# Patient Record
Sex: MALE | Race: WHITE | NOT HISPANIC OR LATINO | Employment: OTHER | URBAN - METROPOLITAN AREA
[De-identification: names, ages, dates, MRNs, and addresses within clinical notes are randomized per-mention and may not be internally consistent; named-entity substitution may affect disease eponyms.]

---

## 2017-01-04 ENCOUNTER — OFFICE VISIT (OUTPATIENT)
Dept: FAMILY MEDICINE | Facility: CLINIC | Age: 57
End: 2017-01-04
Payer: COMMERCIAL

## 2017-01-04 VITALS
HEART RATE: 86 BPM | BODY MASS INDEX: 38.07 KG/M2 | HEIGHT: 74 IN | TEMPERATURE: 96.8 F | RESPIRATION RATE: 18 BRPM | SYSTOLIC BLOOD PRESSURE: 122 MMHG | DIASTOLIC BLOOD PRESSURE: 70 MMHG | WEIGHT: 296.6 LBS

## 2017-01-04 DIAGNOSIS — A08.4 STOMACH FLU: Primary | ICD-10-CM

## 2017-01-04 PROCEDURE — 99213 OFFICE O/P EST LOW 20 MIN: CPT | Performed by: FAMILY MEDICINE

## 2017-01-04 RX ORDER — ONDANSETRON 4 MG/1
4-8 TABLET, ORALLY DISINTEGRATING ORAL EVERY 6 HOURS PRN
Qty: 30 TABLET | Refills: 0 | Status: SHIPPED | OUTPATIENT
Start: 2017-01-04 | End: 2018-03-12

## 2017-01-04 NOTE — PROGRESS NOTES
"  SUBJECTIVE:                                                    Ruddy Browning is a 56 year old male who presents to clinic today for the following health issues:      Acute Illness   Acute illness concerns: Body aches   Onset: 01/04/2017     Fever: no    Chills/Sweats: YES    Headache (location?): YES    Sinus Pressure:YES- Unsure if it is pressure or a bad headache    Conjunctivitis:  no    Ear Pain: no    Rhinorrhea: YES    Congestion: YES    Sore Throat: YES     Cough: no    Wheeze: no    Decreased Appetite: YES- Scared to eat due to the acid reflux    Nausea: YES    Vomiting: no     Diarrhea:  YES- This morning    Dysuria/Freq.: no    Fatigue/Achiness: YES    Sick/Strep Exposure: no     Therapies Tried and outcome: N/A    No results found for: RS   Chief Complaint   Patient presents with     Gastrophageal Reflux     Has had symptoms for about a week     Sick     Has felt like he is getting sick, upset stomach, and body aches.                Problem list and histories reviewed & adjusted, as indicated.  Additional history: as documented        ROS:  Constitutional, HEENT, cardiovascular, pulmonary, gi and gu systems are negative, except as otherwise noted.    OBJECTIVE:                                                    /70 mmHg  Pulse 86  Temp(Src) 96.8  F (36  C) (Tympanic)  Resp 18  Ht 6' 1.62\" (1.87 m)  Wt 296 lb 9.6 oz (134.537 kg)  BMI 38.47 kg/m2  Body mass index is 38.47 kg/(m^2).  GENERAL: healthy, alert and no distress  NECK: no adenopathy, no asymmetry, masses, or scars and thyroid normal to palpation  RESP: lungs clear to auscultation - no rales, rhonchi or wheezes  CV: regular rate and rhythm, normal S1 S2, no S3 or S4, no murmur, click or rub, no peripheral edema and peripheral pulses strong  ABDOMEN: soft, nontender, no hepatosplenomegaly, no masses and bowel sounds normal  MS: no gross musculoskeletal defects noted, no edema         ASSESSMENT/PLAN:                                 "                              ICD-10-CM    1. Stomach flu A08.4 ondansetron (ZOFRAN ODT) 4 MG ODT tab       Stomach flu. Conservative measures. Also use ondansetron for nausea. Expect this to resolve in the next few days. If not call back for further advice.    Ari Singh MD  [facility]

## 2017-01-04 NOTE — NURSING NOTE
"Chief Complaint   Patient presents with     Gastrophageal Reflux     Has had symptoms for about a week     Sick     Has felt like he is getting sick, upset stomach, and body aches.        Initial /70 mmHg  Pulse 86  Temp(Src) 96.8  F (36  C) (Tympanic)  Resp 18  Ht 6' 1.62\" (1.87 m)  Wt 296 lb 9.6 oz (134.537 kg)  BMI 38.47 kg/m2 Estimated body mass index is 38.47 kg/(m^2) as calculated from the following:    Height as of this encounter: 6' 1.62\" (1.87 m).    Weight as of this encounter: 296 lb 9.6 oz (134.537 kg).  BP completed using cuff size: large    "

## 2017-01-10 ENCOUNTER — OFFICE VISIT (OUTPATIENT)
Dept: FAMILY MEDICINE | Facility: CLINIC | Age: 57
End: 2017-01-10
Payer: COMMERCIAL

## 2017-01-10 VITALS
WEIGHT: 295.3 LBS | RESPIRATION RATE: 16 BRPM | HEIGHT: 72 IN | SYSTOLIC BLOOD PRESSURE: 112 MMHG | DIASTOLIC BLOOD PRESSURE: 78 MMHG | BODY MASS INDEX: 40 KG/M2 | OXYGEN SATURATION: 98 % | TEMPERATURE: 97.7 F | HEART RATE: 96 BPM

## 2017-01-10 DIAGNOSIS — K21.00 GASTROESOPHAGEAL REFLUX DISEASE WITH ESOPHAGITIS: Primary | ICD-10-CM

## 2017-01-10 DIAGNOSIS — K22.70 BARRETT'S ESOPHAGUS WITHOUT DYSPLASIA: ICD-10-CM

## 2017-01-10 PROCEDURE — 99213 OFFICE O/P EST LOW 20 MIN: CPT | Performed by: FAMILY MEDICINE

## 2017-01-10 RX ORDER — SUCRALFATE 1 G/1
1 TABLET ORAL 4 TIMES DAILY
Qty: 40 TABLET | Refills: 1 | Status: SHIPPED | OUTPATIENT
Start: 2017-01-10 | End: 2017-01-17

## 2017-01-10 ASSESSMENT — PAIN SCALES - GENERAL: PAINLEVEL: NO PAIN (0)

## 2017-01-10 NOTE — NURSING NOTE
Chief Complaint   Patient presents with     Consult     Reyes's esophagus flare up?       Initial /78 mmHg  Pulse 96  Temp(Src) 97.7  F (36.5  C) (Temporal)  Resp 16  Ht 6' (1.829 m)  Wt 295 lb 4.8 oz (133.947 kg)  BMI 40.04 kg/m2  SpO2 98% Estimated body mass index is 40.04 kg/(m^2) as calculated from the following:    Height as of this encounter: 6' (1.829 m).    Weight as of this encounter: 295 lb 4.8 oz (133.947 kg).  BP completed using cuff size: large    Health Maintenance Due   Topic Date Due     ADVANCE DIRECTIVE PLANNING Q5 YRS (NO INBASKET)  09/01/1979     HEPATITIS C SCREENING  09/01/1979     INFLUENZA VACCINE (SYSTEM ASSIGNED)  09/01/2016     Casandra Overton, [facility]

## 2017-01-10 NOTE — PROGRESS NOTES
"  SUBJECTIVE:                                                    Ruddy Browning is a 56 year old male who presents to clinic today for the following health issues:    Concern - Consult      Onset: flare Up?      Description:   Reyes's esophagus         Ruddy is here today for abdominal discomfort.  He is known to have Reyes's esophagitis based on the endoscopy three years ago. Since Christmas, his stomach has been feeling very uncomfortable.  Stated that in the last 3 weeks, his stomach feel \"gargle\" all the times and has been feeling bloated ; feel full all the way to his neck. His throat feels burning pain. Also has the ST with acid taste; has to elevate his head at night.  No excessive bloating or burping.  Happened after he over eating at the [employer] party with pasta. Switched Omeprazole from daily to bid with no help.  No chest pain or SOB. No N/V/D/C.  NO fever or chill. No other concern.     Problem list and histories reviewed & adjusted, as indicated.  Additional history: as documented    Patient Active Problem List   Diagnosis     Plantar fascial fibromatosis     Mixed hyperlipidemia     Hereditary and idiopathic peripheral neuropathy     Migraine     Essential hypertension, benign     ESOPHAGEAL REFLUX     Varicose veins of lower extremities with complications     Reyes's esophagus     HYPERLIPIDEMIA LDL GOAL <130     Morbid obesity (H)     Tear of medial cartilage or meniscus of Rt knee, current     Edema     Hypertension goal BP (blood pressure) < 140/90     Elevated cholesterol with elevated triglycerides     Past Surgical History   Procedure Laterality Date     Hc ugi endoscopy diag w biopsy  12/5/2008, 03/12/10     Colonoscopy  4/8/2013     Procedure: COLONOSCOPY;  Colonoscopy, Esophagogastroduodenoscopy with Single Biopsy;  Surgeon: Eddie Marshall MD;  Location: [facility]     Esophagoscopy, gastroscopy, duodenoscopy (egd), combined  4/8/2013     Procedure: COMBINED ESOPHAGOSCOPY, " GASTROSCOPY, DUODENOSCOPY (EGD), BIOPSY SINGLE OR MULTIPLE;;  Surgeon: Eddie Marshall MD;  Location: [facility]     Open reduction internal fixation wrist Right 2016     Rotator cuff repair rt/lt Right 3/2016     Remove nail bed/finger tip Left 2016       Social History   Substance Use Topics     Smoking status: Never Smoker      Smokeless tobacco: Never Used     Alcohol Use: No      Comment: rare     Family History   Problem Relation Age of Onset     HEART DISEASE Father      alcohol dependant,  at age 52     Lipids Father      CEREBROVASCULAR DISEASE Brother      had an aneurysm     Arthritis Sister      rheumatiod arthritis     Hypertension Father          Current Outpatient Prescriptions   Medication Sig Dispense Refill     sucralfate (CARAFATE) 1 GM tablet Take 1 tablet (1 g) by mouth 4 times daily 40 tablet 1     ondansetron (ZOFRAN ODT) 4 MG ODT tab Take 1-2 tablets (4-8 mg) by mouth every 6 hours as needed for nausea 30 tablet 0     omeprazole (PRILOSEC) 20 MG CR capsule TAKE ONE CAPSULE BY MOUTH TWICE A  capsule 2     pravastatin (PRAVACHOL) 20 MG tablet TAKE ONE TABLET BY MOUTH ONCE DAILY 90 tablet 3     lisinopril-hydrochlorothiazide (PRINZIDE,ZESTORETIC) 20-25 MG per tablet TAKE TWO TABLETS BY MOUTH EVERY MORNING 180 tablet 2     aspirin 81 MG tablet ONE DAILY       SUMAtriptan (IMITREX) 100 MG tablet Take 1 tablet (100 mg) by mouth at onset of headache for migraine May repeat in 2 hours if headache not gone. Max dose is 200 mg in 24 hours 9 tablet 6     cetirizine (ZYRTEC) 10 MG tablet Take 1 tablet (10 mg) by mouth daily 90 tablet 3     Cholecalciferol (VITAMIN D3 PO) Take 1,000 Units by mouth daily       MULTI VITAMIN MENS OR TABS None Entered       Allergies   Allergen Reactions     No Known Drug Allergies        ROS:  Constitutional, HEENT, cardiovascular, pulmonary, gi and gu systems are negative, except as otherwise noted.    OBJECTIVE:                                                     /78 mmHg  Pulse 96  Temp(Src) 97.7  F (36.5  C) (Temporal)  Resp 16  Ht 6' (1.829 m)  Wt 295 lb 4.8 oz (133.947 kg)  BMI 40.04 kg/m2  SpO2 98%  Body mass index is 40.04 kg/(m^2).  GENERAL: healthy, alert and no distress  HENT: ear canals and TM's normal. Nares are non-congested. Oropharynx is pink and moist. No tender with palpation to the sinuses.  NECK: no adenopathy, supple and thyroid normal to palpation  RESP: lungs clear to auscultation - no rales, rhonchi or wheezes  CV: regular rate and rhythm, no murmur.  ABDOMEN: soft, non-distended, but obese. Mild tender with palpation to the epigastric and ULQ area with no rebound or guarding and bowel sounds normal  MS: No tender with palpation to the chest wall or sternum.    Diagnostic Test Results:  none      ASSESSMENT/PLAN:                                                        ICD-10-CM    1. Gastroesophageal reflux disease with esophagitis K21.0 sucralfate (CARAFATE) 1 GM tablet   2. Reyes's esophagus without dysplasia K22.70 sucralfate (CARAFATE) 1 GM tablet     With acute flare up from dietary change. Discussed with him about the nature of the condition. Diet modification discussed and encourage blend food for now until feeling better then advance as tolerated.  Avoid greasy, acidic and spicy food. Avoid pop and late meals. Continue with Omeprazole bid and add Carafate. Also recommend OTC Maalox as needed.  Call in if not improve in the next couples days of if has any concern.    Trisha Chen Mai, MD  [facility]

## 2017-01-13 ENCOUNTER — TELEPHONE (OUTPATIENT)
Dept: FAMILY MEDICINE | Facility: CLINIC | Age: 57
End: 2017-01-13

## 2017-01-13 NOTE — TELEPHONE ENCOUNTER
Reason for Call:  Same Day Appointment, Requested Provider:  Maikol Allen M.D.    PCP: Maikol Allen    Reason for visit: patients wife calling [place], would like  to get in sooner to see Dr. Allen, (next appt, is not until 2-01-17)    Duration of symptoms: 2 1/2 -3 weeks    Have you been treated for this in the past? No    Additional comments: patients wife states that  is having trouble with stomach (possible Ulcer) Patient is afraid to eat,lost a lot of weight, trouble laying down sometimes, has seen Dr. Pickering, and Dr. Singh in the past for this, but would like to see Dr. Allen, as he is his regular doctor. Please call and advise    Can we leave a detailed message on this number? YES    Phone number patient can be reached at: Home number on file [phone] (home)    Best Time: an    Call taken on 1/13/2017 at 2:43 PM by Luisana Wolfe

## 2017-01-16 NOTE — TELEPHONE ENCOUNTER
Called pt and informed him of the below msg. He states that he was looking for an appt after 3:30. I told him the latest we can see him is 3:20 tomorrow otherwise we have to try for a Monday when he works later. 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  Isabel Rubio MA

## 2017-01-16 NOTE — TELEPHONE ENCOUNTER
You can use one of my same day appointments for tomorrow or Wednesday.    Electronically signed by:  Maikol Allen M.D.  1/16/2017

## 2017-01-17 ENCOUNTER — OFFICE VISIT (OUTPATIENT)
Dept: FAMILY MEDICINE | Facility: CLINIC | Age: 57
End: 2017-01-17
Payer: COMMERCIAL

## 2017-01-17 VITALS
OXYGEN SATURATION: 97 % | HEIGHT: 74 IN | RESPIRATION RATE: 18 BRPM | HEART RATE: 91 BPM | WEIGHT: 295 LBS | BODY MASS INDEX: 37.86 KG/M2 | TEMPERATURE: 96.7 F | DIASTOLIC BLOOD PRESSURE: 82 MMHG | SYSTOLIC BLOOD PRESSURE: 120 MMHG

## 2017-01-17 DIAGNOSIS — K22.70 BARRETT'S ESOPHAGUS WITHOUT DYSPLASIA: ICD-10-CM

## 2017-01-17 DIAGNOSIS — K21.00 GASTROESOPHAGEAL REFLUX DISEASE WITH ESOPHAGITIS: Primary | ICD-10-CM

## 2017-01-17 DIAGNOSIS — R42 LIGHTHEADEDNESS: ICD-10-CM

## 2017-01-17 DIAGNOSIS — Z11.59 NEED FOR HEPATITIS C SCREENING TEST: ICD-10-CM

## 2017-01-17 DIAGNOSIS — M25.50 ARTHRALGIA, UNSPECIFIED JOINT: ICD-10-CM

## 2017-01-17 LAB
ALBUMIN SERPL-MCNC: 4.2 G/DL (ref 3.4–5)
ALP SERPL-CCNC: 73 U/L (ref 40–150)
ALT SERPL W P-5'-P-CCNC: 39 U/L (ref 0–70)
ANION GAP SERPL CALCULATED.3IONS-SCNC: 7 MMOL/L (ref 3–14)
AST SERPL W P-5'-P-CCNC: 26 U/L (ref 0–45)
BILIRUB SERPL-MCNC: 0.3 MG/DL (ref 0.2–1.3)
BUN SERPL-MCNC: 39 MG/DL (ref 7–30)
CALCIUM SERPL-MCNC: 9.6 MG/DL (ref 8.5–10.1)
CHLORIDE SERPL-SCNC: 104 MMOL/L (ref 94–109)
CO2 SERPL-SCNC: 30 MMOL/L (ref 20–32)
CREAT SERPL-MCNC: 1.66 MG/DL (ref 0.66–1.25)
CRP SERPL-MCNC: <2.9 MG/L (ref 0–8)
ERYTHROCYTE [DISTWIDTH] IN BLOOD BY AUTOMATED COUNT: 13 % (ref 10–15)
ERYTHROCYTE [SEDIMENTATION RATE] IN BLOOD BY WESTERGREN METHOD: 8 MM/H (ref 0–20)
GFR SERPL CREATININE-BSD FRML MDRD: 43 ML/MIN/1.7M2
GLUCOSE SERPL-MCNC: 100 MG/DL (ref 70–99)
HCT VFR BLD AUTO: 43.4 % (ref 40–53)
HGB BLD-MCNC: 15 G/DL (ref 13.3–17.7)
MCH RBC QN AUTO: 29.6 PG (ref 26.5–33)
MCHC RBC AUTO-ENTMCNC: 34.6 G/DL (ref 31.5–36.5)
MCV RBC AUTO: 86 FL (ref 78–100)
PLATELET # BLD AUTO: 228 10E9/L (ref 150–450)
POTASSIUM SERPL-SCNC: 4 MMOL/L (ref 3.4–5.3)
PROT SERPL-MCNC: 7.5 G/DL (ref 6.8–8.8)
RBC # BLD AUTO: 5.07 10E12/L (ref 4.4–5.9)
SODIUM SERPL-SCNC: 141 MMOL/L (ref 133–144)
WBC # BLD AUTO: 9.3 10E9/L (ref 4–11)

## 2017-01-17 PROCEDURE — 86431 RHEUMATOID FACTOR QUANT: CPT | Performed by: FAMILY MEDICINE

## 2017-01-17 PROCEDURE — 36415 COLL VENOUS BLD VENIPUNCTURE: CPT | Performed by: FAMILY MEDICINE

## 2017-01-17 PROCEDURE — 85613 RUSSELL VIPER VENOM DILUTED: CPT | Performed by: FAMILY MEDICINE

## 2017-01-17 PROCEDURE — 86803 HEPATITIS C AB TEST: CPT | Performed by: FAMILY MEDICINE

## 2017-01-17 PROCEDURE — 99214 OFFICE O/P EST MOD 30 MIN: CPT | Performed by: FAMILY MEDICINE

## 2017-01-17 PROCEDURE — 00000401 ZZHCL STATISTIC THROMBIN TIME NC: Performed by: FAMILY MEDICINE

## 2017-01-17 PROCEDURE — 85027 COMPLETE CBC AUTOMATED: CPT | Performed by: FAMILY MEDICINE

## 2017-01-17 PROCEDURE — 86140 C-REACTIVE PROTEIN: CPT | Performed by: FAMILY MEDICINE

## 2017-01-17 PROCEDURE — 85730 THROMBOPLASTIN TIME PARTIAL: CPT | Performed by: FAMILY MEDICINE

## 2017-01-17 PROCEDURE — 00000167 ZZHCL STATISTIC INR NC: Performed by: FAMILY MEDICINE

## 2017-01-17 PROCEDURE — 80053 COMPREHEN METABOLIC PANEL: CPT | Performed by: FAMILY MEDICINE

## 2017-01-17 PROCEDURE — 85652 RBC SED RATE AUTOMATED: CPT | Performed by: FAMILY MEDICINE

## 2017-01-17 RX ORDER — SUCRALFATE 1 G/1
1 TABLET ORAL 2 TIMES DAILY PRN
Qty: 40 TABLET | Refills: 1 | Status: SHIPPED | OUTPATIENT
Start: 2017-01-17 | End: 2020-04-29

## 2017-01-17 ASSESSMENT — PAIN SCALES - GENERAL: PAINLEVEL: NO PAIN (0)

## 2017-01-17 NOTE — PROGRESS NOTES
Patient seen, note dictated, (ID#[id number]).  Electronically signed by:  Maikol Allen M.D.  1/17/2017

## 2017-01-17 NOTE — NURSING NOTE
Chief Complaint   Patient presents with     Gastric Problem       Initial There were no vitals taken for this visit. Estimated body mass index is 40.04 kg/(m^2) as calculated from the following:    Height as of 1/10/17: 6' (1.829 m).    Weight as of 1/10/17: 295 lb 4.8 oz (133.947 kg).  BP completed using cuff size: mohini Rubio MA

## 2017-01-18 ENCOUNTER — TELEPHONE (OUTPATIENT)
Dept: FAMILY MEDICINE | Facility: CLINIC | Age: 57
End: 2017-01-18

## 2017-01-18 LAB — HCV AB SERPL QL IA: NORMAL

## 2017-01-18 NOTE — TELEPHONE ENCOUNTER
Left message for patient to return call to schedule EGD. If Reena or Magui are not available, please transfer to same day surgery

## 2017-01-18 NOTE — PROGRESS NOTES
SUBJECTIVE:  Ruddy Browning is in today for further discussion on his reflux and other issues that have been going on.  He noted back around the holidays that he started getting a lot more reflux.  Years ago he had been on Prilosec and had a couple upper endoscopies with Dr. Hollis.  I looked back at those and he actually has some biopsies that shows some Reyes's esophagitis without dysplasia, that was in 12/2008.  They had no evidence of dysplasia or malignancy at that time.  He had a followup endoscopy in 03/2010 which was negative for Reyes's mucosa at that time.  He had not been taking a proton pump inhibitor or antireflux medications for quite some time now, but was started back on omeprazole when he saw Dr. Singh on 01/04 and then when he saw Dr. Pickering in followup on the 10th was put on Carafate.  He has been faithful taking the Carafate 4 times a day this last week and he feels that it has helped.  He is also put on the omeprazole to twice a day.  He says he is getting some reflux symptoms mainly at night when he is lying down, but for the most part he has cut out a lot of tomato products, onions, spicy foods and acidic juices and that has helped quite a bit.  His wife is with him today and is confirming all of this.  He has not noticed any change in his stool.  He has not had any vomiting.  He does note that he has been having a lot more aches and pains in his upper extremities and his lower extremities, buttock region, he said particularly when he is finishing up his day at work and getting home he feels a lot more of this achiness.  He has not noticed specific swelling of his joints.  He did have a rotator cuff repair back in April of this past year at [facility] after a fall at work and wrist surgery but he tomorrow is going to be his last day of light duty.  He is still having some issues with doing some overhead lifting, but for the most part he feels like the joint itself is back  to normal.  Other things that he has noticed is that he has been having some lightheadedness with bending or stooping at work.  When he bends over to  a piece of pipe or to pick something up lately he has been noticing more lightheadedness and some dizziness almost as though is going to pass out, he just kind of steady's himself and then he will get better.  He thinks it has been mostly associated with the bending and stooping.  He takes his lisinopril for blood pressure medications in the morning and has been doing that for quite some time.  We discussed that maybe he is having a first pass effect of the medication so is dropping his blood pressure a little bit initially and that is what is causing him to have some of those symptoms.  He is going to pay more attention to when they are occurring.      FAMILY HISTORY:  Reviewed.  He has a sister that has got rheumatoid arthritis.  His dad had hypertension.  His brother had a brain aneurysm.  His dad had high cholesterol and heart disease which was probably secondary to his alcoholism.  His dad is  but his mother is still alive.        MEDICATIONS:  We reviewed all of his medications:     1.  He has been as I said this Carafate 4 times a day.     2.  He has got Ondansetron on file, but he has not used that since he saw Dr. Singh on the .    3.  Prilosec 20 mg twice a day.   4.  Pravachol 20 mg once daily.   5.  Lisinopril 20/25 mg 2 tablets every morning.     6.  He takes an 81 mg aspirin daily.     7.  He will use Vitamin D 1000 units daily.   8.  Zyrtec 10 mg as needed for allergies.     9.  Imitrex 100 mg tabs as needed for his migraines.    10.  Men's multivitamin.      ALLERGIES:  No known drug allergies or food allergies.      SOCIAL HISTORY:  He drinks very sparingly.  He does not drink any caffeinated beverages, and he drinks some soda like ginger ale, but not much carbonated beverages either, does drink some milk, has not noticed that if  it affects his stomach or not.      OBJECTIVE:   VITAL SIGNS:  On exam, his blood pressure today is 120/82, his pulse is 91, temperature 98.7, respirations 18, weight is 295, he is almost 6' 2'' so his BMI is 38.39.  He does not look or appear to be in any distress.   HEART:  Regular without murmur.   LUNGS:  Clear to auscultation throughout the anterior and posterior lung fields.   ABDOMEN:  Obese, soft, nontender throughout.  He does not have any epigastric right upper or left upper quadrant pain.  No hepatosplenomegaly.   MUSCULOSKELETAL:  He has no joint effusions.  He has had no redness or swelling of the joints, good range of motion of the shoulders, elbows, wrists, hips and knees.  He has got no synovial swelling.      LABORATORY DATA:  I am going to draw some labs today, I am going to check a comprehensive profile, CBC, sed rate, CRP, rheumatoid factor and lupus.  We are also going to do his hepatitis C screen today since we are doing all of this other blood work and that is recommend to be done on all baby boomer age groups.      ASSESSMENT:     1.  Gastroesophageal reflux with a history of esophagitis and some Reyes's esophagus via biopsy back in 2008 but then on repeat biopsy in 2010 that had cleared.   2.  Arthralgias and just body aches, etiology unclear.   3.  Lightheadedness, etiology of this is unclear, also it could be secondary to first pass effects of his blood pressure medication lowering his blood pressures a little bit causing him to have some vasovagal episodes.        PLAN:  Our plan is to switch his blood pressure medication to the evening so that he does not have as much blood pressure lowering effect immediately.  He is going to discontinue taking the omeprazole but instead of taking it twice daily he will just take it both at the same time.  He will take the Carafate just in the evening and since he is having symptoms only at bedtime and then continue with his other supplements as he  normally does.  I am going to get him scheduled for another upper endoscopy to see if he has developed further chronic changes, particularly want to rule out Reyes's and especially any dysplasia.        TOTAL TIME SPENT:  I spent 40 minutes with the patient and his wife discussing his concerns and spent more than 50% of time addressing the esophagitis and possible Reyes's and his lightheadedness and arthralgias.         MAGGIE MCCLURE MD             D: 2017 16:25   T: 2017 07:21   MT: [id number]      Name:     GENOVEVA MCGHEE   MRN:      [id number]        Account:      [id number]   :      1960           Visit Date:   2017      Document: [id number]

## 2017-01-18 NOTE — PROGRESS NOTES
Quick Note:    Ruddy, your liver test, electrolyte and inflammation markers were all normal. You are a little dehydrated so your kidney function went down probably due to that in itself. I do want to repeat that in a week. Make sure you are drinking at least 64 oz of water a day for a few days prior to coming in for that repeat lab. Thanks,     Electronically signed by:  Maikol Allen M.D.  1/18/2017    ______

## 2017-01-19 LAB — RHEUMATOID FACT SER NEPH-ACNC: <20 IU/ML (ref 0–20)

## 2017-01-20 LAB — LA PPP-IMP: NORMAL

## 2017-01-25 ENCOUNTER — HOSPITAL ENCOUNTER (OUTPATIENT)
Facility: CLINIC | Age: 57
Discharge: HOME | End: 2017-01-25
Attending: INTERNAL MEDICINE | Admitting: INTERNAL MEDICINE
Payer: COMMERCIAL

## 2017-01-25 ENCOUNTER — SURGERY (OUTPATIENT)
Age: 57
End: 2017-01-25

## 2017-01-25 VITALS
TEMPERATURE: 98 F | RESPIRATION RATE: 16 BRPM | OXYGEN SATURATION: 92 % | SYSTOLIC BLOOD PRESSURE: 86 MMHG | DIASTOLIC BLOOD PRESSURE: 55 MMHG

## 2017-01-25 LAB — UPPER GI ENDOSCOPY: NORMAL

## 2017-01-25 PROCEDURE — 40000296 ZZH STATISTIC ENDO RECOVERY CLASS 1:2 FIRST HOUR: Performed by: INTERNAL MEDICINE

## 2017-01-25 PROCEDURE — 25000125 ZZHC RX 250: Performed by: INTERNAL MEDICINE

## 2017-01-25 PROCEDURE — 88305 TISSUE EXAM BY PATHOLOGIST: CPT | Mod: 26 | Performed by: INTERNAL MEDICINE

## 2017-01-25 PROCEDURE — 43239 EGD BIOPSY SINGLE/MULTIPLE: CPT | Performed by: INTERNAL MEDICINE

## 2017-01-25 PROCEDURE — 88305 TISSUE EXAM BY PATHOLOGIST: CPT | Performed by: INTERNAL MEDICINE

## 2017-01-25 RX ORDER — LIDOCAINE 40 MG/G
CREAM TOPICAL
Status: DISCONTINUED | OUTPATIENT
Start: 2017-01-25 | End: 2017-01-25 | Stop reason: HOSPADM

## 2017-01-25 RX ORDER — ONDANSETRON 2 MG/ML
4 INJECTION INTRAMUSCULAR; INTRAVENOUS
Status: DISCONTINUED | OUTPATIENT
Start: 2017-01-25 | End: 2017-01-25 | Stop reason: HOSPADM

## 2017-01-25 RX ORDER — FENTANYL CITRATE 50 UG/ML
INJECTION, SOLUTION INTRAMUSCULAR; INTRAVENOUS PRN
Status: DISCONTINUED | OUTPATIENT
Start: 2017-01-25 | End: 2017-01-25 | Stop reason: HOSPADM

## 2017-01-25 RX ADMIN — LIDOCAINE HYDROCHLORIDE 1 ML: 10 INJECTION, SOLUTION EPIDURAL; INFILTRATION; INTRACAUDAL; PERINEURAL at 10:20

## 2017-01-25 RX ADMIN — LIDOCAINE HYDROCHLORIDE 5 ML: 20 SOLUTION ORAL; TOPICAL at 10:54

## 2017-01-25 RX ADMIN — MIDAZOLAM HYDROCHLORIDE 1 MG: 1 INJECTION, SOLUTION INTRAMUSCULAR; INTRAVENOUS at 10:57

## 2017-01-25 RX ADMIN — FENTANYL CITRATE 25 MCG: 50 INJECTION, SOLUTION INTRAMUSCULAR; INTRAVENOUS at 10:59

## 2017-01-25 RX ADMIN — MIDAZOLAM HYDROCHLORIDE 1 MG: 1 INJECTION, SOLUTION INTRAMUSCULAR; INTRAVENOUS at 10:58

## 2017-01-25 RX ADMIN — FENTANYL CITRATE 50 MCG: 50 INJECTION, SOLUTION INTRAMUSCULAR; INTRAVENOUS at 10:54

## 2017-01-25 RX ADMIN — MIDAZOLAM HYDROCHLORIDE 1 MG: 1 INJECTION, SOLUTION INTRAMUSCULAR; INTRAVENOUS at 10:55

## 2017-01-25 RX ADMIN — MIDAZOLAM HYDROCHLORIDE 2 MG: 1 INJECTION, SOLUTION INTRAMUSCULAR; INTRAVENOUS at 10:54

## 2017-01-25 NOTE — DISCHARGE INSTRUCTIONS
[facility] Discharge Instructions     Discharge disposition:  Discharged to home       Diet:  Regular       Activity Activity as tolerated       Follow-up: with Primary Physician PRN       Additional instructions: None   \

## 2017-01-25 NOTE — IP AVS SNAPSHOT
[facility]    [address] [address]    Phone:  [phone]                                       After Visit Summary   1/25/2017    Ruddy Browning    MRN: [id number]           After Visit Summary Signature Page     I have received my discharge instructions, and my questions have been answered. I have discussed any challenges I see with this plan with the nurse or doctor.    ..........................................................................................................................................  Patient/Patient Representative Signature      ..........................................................................................................................................  Patient Representative Print Name and Relationship to Patient    ..................................................               ................................................  Date                                            Time    ..........................................................................................................................................  Reviewed by Signature/Title    ...................................................              ..............................................  Date                                                            Time

## 2017-01-25 NOTE — IP AVS SNAPSHOT
MRN:[id number]                      After Visit Summary   1/25/2017    Ruddy Browning    MRN: [id number]           Thank you!     Thank you for choosing [facility] for your care. Our goal is always to provide you with excellent care. Hearing back from our patients is one way we can continue to improve our services. Please take a few minutes to complete the written survey that you may receive in the mail after you visit with us. Thank you!        Patient Information     Date Of Birth          1960        About your hospital stay     You were admitted on:  January 25, 2017 You last received care in the:  [facility]    You were discharged on:  January 25, 2017       Who to Call     For medical emergencies, please call 911.  For non-urgent questions about your medical care, please call your primary care provider or clinic, [phone]  For questions related to your surgery, please call your surgery clinic        Attending Provider     Provider    Kodi Ng MD       Primary Care Provider Office Phone # Fax #    Maikol Allen [phone]       [facility] [address] [place] [address]        Further instructions from your care team       [facility] Discharge Instructions     Discharge disposition:  Discharged to home       Diet:  Regular       Activity Activity as tolerated       Follow-up: with Primary Physician PRN       Additional instructions: None   \      Pending Results     No orders found from 1/24/2017 to 1/26/2017.            Admission Information        Provider Department Dept Phone    1/25/2017 Kodi Ng MD [facility] [phone]      Your Vitals Were     Blood Pressure Temperature Respirations             111/68 mmHg 98  F (36.7  C) (Oral) 14         MyChart Information     [employer] gives you secure access to your electronic health record. If you see a primary care  provider, you can also send messages to your care team and make appointments. If you have questions, please call your primary care clinic.  If you do not have a primary care provider, please call [phone] and they will assist you.        Care EveryWhere ID     This is your Care EveryWhere ID. This could be used by other organizations to access your [facility] medical records  [id number]           Review of your medicines      CONTINUE these medicines which have NOT CHANGED        Dose / Directions    aspirin 81 MG tablet   Used for:  Essential hypertension, benign, Mixed hyperlipidemia        ONE DAILY   Refills:  0       cetirizine 10 MG tablet   Commonly known as:  zyrTEC   Used for:  Seasonal allergies        Dose:  10 mg   Take 1 tablet (10 mg) by mouth daily   Quantity:  90 tablet   Refills:  3       lisinopril-hydrochlorothiazide 20-25 MG per tablet   Commonly known as:  PRINZIDE/ZESTORETIC   Used for:  Essential hypertension, benign        TAKE TWO TABLETS BY MOUTH EVERY MORNING   Quantity:  180 tablet   Refills:  2       MULTI vitamin  MENS Tabs        None Entered   Refills:  0       omeprazole 20 MG CR capsule   Commonly known as:  priLOSEC   Used for:  Gastroesophageal reflux disease, esophagitis presence not specified        TAKE ONE CAPSULE BY MOUTH TWICE A DAY   Quantity:  180 capsule   Refills:  2       ondansetron 4 MG ODT tab   Commonly known as:  ZOFRAN ODT   Used for:  Stomach flu        Dose:  4-8 mg   Take 1-2 tablets (4-8 mg) by mouth every 6 hours as needed for nausea   Quantity:  30 tablet   Refills:  0       pravastatin 20 MG tablet   Commonly known as:  PRAVACHOL   Used for:  Hyperlipidemia LDL goal <130        TAKE ONE TABLET BY MOUTH ONCE DAILY   Quantity:  90 tablet   Refills:  3       sucralfate 1 GM tablet   Commonly known as:  CARAFATE   Used for:  Gastroesophageal reflux disease with esophagitis, Arthralgia, unspecified joint, Reyes's esophagus without dysplasia        Dose:  1  g   Take 1 tablet (1 g) by mouth 2 times daily as needed   Quantity:  40 tablet   Refills:  1       SUMAtriptan 100 MG tablet   Commonly known as:  IMITREX   Used for:  Migraine, unspecified, without mention of intractable migraine without mention of status migrainosus        Dose:  100 mg   Take 1 tablet (100 mg) by mouth at onset of headache for migraine May repeat in 2 hours if headache not gone. Max dose is 200 mg in 24 hours   Quantity:  9 tablet   Refills:  6       VITAMIN D3 PO        Dose:  1000 Units   Take 1,000 Units by mouth daily   Refills:  0                Protect others around you: Learn how to safely use, store and throw away your medicines at www.disposemymeds.org.             Medication List: This is a list of all your medications and when to take them. Check marks below indicate your daily home schedule. Keep this list as a reference.      Medications           Morning Afternoon Evening Bedtime As Needed    aspirin 81 MG tablet   ONE DAILY                                cetirizine 10 MG tablet   Commonly known as:  zyrTEC   Take 1 tablet (10 mg) by mouth daily                                lisinopril-hydrochlorothiazide 20-25 MG per tablet   Commonly known as:  PRINZIDE/ZESTORETIC   TAKE TWO TABLETS BY MOUTH EVERY MORNING                                MULTI vitamin  MENS Tabs   None Entered                                omeprazole 20 MG CR capsule   Commonly known as:  priLOSEC   TAKE ONE CAPSULE BY MOUTH TWICE A DAY                                ondansetron 4 MG ODT tab   Commonly known as:  ZOFRAN ODT   Take 1-2 tablets (4-8 mg) by mouth every 6 hours as needed for nausea                                pravastatin 20 MG tablet   Commonly known as:  PRAVACHOL   TAKE ONE TABLET BY MOUTH ONCE DAILY                                sucralfate 1 GM tablet   Commonly known as:  CARAFATE   Take 1 tablet (1 g) by mouth 2 times daily as needed                                SUMAtriptan 100 MG tablet    Commonly known as:  IMITREX   Take 1 tablet (100 mg) by mouth at onset of headache for migraine May repeat in 2 hours if headache not gone. Max dose is 200 mg in 24 hours                                VITAMIN D3 PO   Take 1,000 Units by mouth daily

## 2017-01-25 NOTE — CONSULTS
[facility] GI Pre-Procedure Physical Assessment    Ruddy Browning MRN# [id number]   Age: 56 year old YOB: 1960      Date of Surgery: 1/25/2017  Location [facility]      Date of Exam 1/25/2017 Facility (Same day)         Primary care provider: Maikol Allen         Active problem list:   Patient Active Problem List   Diagnosis     Plantar fascial fibromatosis     Mixed hyperlipidemia     Hereditary and idiopathic peripheral neuropathy     Migraine     Essential hypertension, benign     ESOPHAGEAL REFLUX     Varicose veins of lower extremities with complications     Reyes's esophagus     HYPERLIPIDEMIA LDL GOAL <130     Morbid obesity (H)     Tear of medial cartilage or meniscus of Rt knee, current     Edema     Hypertension goal BP (blood pressure) < 140/90     Elevated cholesterol with elevated triglycerides            Medications (include herbals and vitamins):   Any Plavix use in the last 7 days?  No     Current Facility-Administered Medications   Medication     lidocaine 1 % 1 mL     lidocaine (LMX4) kit     sodium chloride (PF) 0.9% PF flush 3 mL     sodium chloride (PF) 0.9% PF flush 3 mL     sodium chloride (PF) 0.9% PF flush 3 mL     ondansetron (ZOFRAN) injection 4 mg             Allergies:      Allergies   Allergen Reactions     No Known Drug Allergies      Allergy to Latex?  No  Allergy to tape?    No          Social History:     Social History   Substance Use Topics     Smoking status: Never Smoker      Smokeless tobacco: Never Used     Alcohol Use: No      Comment: rare            Physical Exam:   All vitals have been reviewed  Blood pressure 111/68, temperature 98  F (36.7  C), temperature source Oral, resp. rate 18.  Airway assessment:   Patient is able to open mouth wide      Lungs:   No increased work of breathing, good air exchange, clear to auscultation bilaterally, no crackles or wheezing      Cardiovascular:   Normal apical impulse, regular rate and  rhythm, normal S1 and S2, no S3 or S4, and no murmur noted           Lab / Radiology Results:   All laboratory data reviewed          Assessment:   Appropriately NPO  Chief complaint or anatomic assessment of involved area: Reyes's esophagus         Plan:   Moderate (conscious) sedation     Patient's active problems diagnostically and therapeutically optimized for the planned procedure  Risks, benefits, alternatives to procedure explained and consent obtained  P2 (patient with mild systemic disease)  Orders and progress notes are in the chart  Discharge from Phase 1 and / or Phase 2 recovery when patient meets criteria    I have reviewed the history and physical, lab finding(s), diagnostic data, medicaitons, and the plan for sedation.  I have determined this patient to be an appropriate candidate for the planned sedation / procedure and have reassessed the patient immediately prior to sedation / procedure.    I have personally and medically directed the administration of medications used.    Kodi Ng MD

## 2017-01-25 NOTE — OR NURSING
Situation:  56 yr.old male s/p conscious sedation for EGD per . BP 86/55,  at discharge.  Background:  Pt's BP upon arrival today was 111/68, HR 65. Pt takes Lisinopril at HS. During EGD/sedation /.  After EGD VS were sBP 87-92, HR 72-85. Pt denies c/o dizziness. Upon discharge, sitting BP 91/59, HR 75; Standing BP 86/55, . Pt denies c/o dizziness. Dr. Ng aware.   Assessment:  Stable with Lower BP which may be that pt is dehydrated.   Recommendation:  Pt instructed to check his BP at home prior to taking his Lisinopril dose this evening. Pt and his wife given BP parameters for holding med. Pt instructed to change his position cautiously and monitor for dizziness, lightheadedness. Pt instructed to increase po fluid intake today, (avoiding alcohol) to insure proper hydration. Pt and his wife understand info provided and state they feel prepared for discharge. Pt taken to his vehicle via w/c for safety.

## 2017-01-26 LAB — COPATH REPORT: NORMAL

## 2017-02-21 ENCOUNTER — TELEPHONE (OUTPATIENT)
Dept: FAMILY MEDICINE | Facility: CLINIC | Age: 57
End: 2017-02-21

## 2017-02-21 ENCOUNTER — HOSPITAL ENCOUNTER (EMERGENCY)
Facility: CLINIC | Age: 57
Discharge: HOME | End: 2017-02-21
Attending: FAMILY MEDICINE | Admitting: FAMILY MEDICINE
Payer: COMMERCIAL

## 2017-02-21 VITALS
HEART RATE: 107 BPM | DIASTOLIC BLOOD PRESSURE: 78 MMHG | SYSTOLIC BLOOD PRESSURE: 112 MMHG | OXYGEN SATURATION: 95 % | RESPIRATION RATE: 20 BRPM | HEIGHT: 73 IN | TEMPERATURE: 99.1 F

## 2017-02-21 DIAGNOSIS — E86.0 DEHYDRATION: ICD-10-CM

## 2017-02-21 DIAGNOSIS — T50.2X5A: ICD-10-CM

## 2017-02-21 DIAGNOSIS — K21.00 GASTROESOPHAGEAL REFLUX DISEASE WITH ESOPHAGITIS: Primary | ICD-10-CM

## 2017-02-21 DIAGNOSIS — N17.9 ACUTE RENAL FAILURE, UNSPECIFIED ACUTE RENAL FAILURE TYPE (H): ICD-10-CM

## 2017-02-21 DIAGNOSIS — R55 POSTURAL DIZZINESS WITH PRESYNCOPE: ICD-10-CM

## 2017-02-21 DIAGNOSIS — R42 POSTURAL DIZZINESS WITH PRESYNCOPE: ICD-10-CM

## 2017-02-21 DIAGNOSIS — I95.1 ORTHOSTATIC HYPOTENSION: ICD-10-CM

## 2017-02-21 LAB
ALBUMIN SERPL-MCNC: 3.9 G/DL (ref 3.4–5)
ALBUMIN UR-MCNC: NEGATIVE MG/DL
ALP SERPL-CCNC: 79 U/L (ref 40–150)
ALT SERPL W P-5'-P-CCNC: 42 U/L (ref 0–70)
ANION GAP SERPL CALCULATED.3IONS-SCNC: 6 MMOL/L (ref 3–14)
APPEARANCE UR: CLEAR
AST SERPL W P-5'-P-CCNC: 27 U/L (ref 0–45)
BASOPHILS # BLD AUTO: 0 10E9/L (ref 0–0.2)
BASOPHILS NFR BLD AUTO: 0.5 %
BILIRUB SERPL-MCNC: 0.4 MG/DL (ref 0.2–1.3)
BILIRUB UR QL STRIP: NEGATIVE
BUN SERPL-MCNC: 27 MG/DL (ref 7–30)
CALCIUM SERPL-MCNC: 8.9 MG/DL (ref 8.5–10.1)
CHLORIDE SERPL-SCNC: 100 MMOL/L (ref 94–109)
CO2 SERPL-SCNC: 30 MMOL/L (ref 20–32)
COLOR UR AUTO: YELLOW
CREAT SERPL-MCNC: 2.07 MG/DL (ref 0.66–1.25)
DIFFERENTIAL METHOD BLD: ABNORMAL
EOSINOPHIL # BLD AUTO: 0.1 10E9/L (ref 0–0.7)
EOSINOPHIL NFR BLD AUTO: 1.6 %
ERYTHROCYTE [DISTWIDTH] IN BLOOD BY AUTOMATED COUNT: 13.4 % (ref 10–15)
GFR SERPL CREATININE-BSD FRML MDRD: 33 ML/MIN/1.7M2
GLUCOSE SERPL-MCNC: 88 MG/DL (ref 70–99)
GLUCOSE UR STRIP-MCNC: NEGATIVE MG/DL
HCT VFR BLD AUTO: 40.1 % (ref 40–53)
HGB BLD-MCNC: 13.9 G/DL (ref 13.3–17.7)
HGB UR QL STRIP: NEGATIVE
IMM GRANULOCYTES # BLD: 0.1 10E9/L (ref 0–0.4)
IMM GRANULOCYTES NFR BLD: 0.7 %
KETONES UR STRIP-MCNC: NEGATIVE MG/DL
LEUKOCYTE ESTERASE UR QL STRIP: NEGATIVE
LYMPHOCYTES # BLD AUTO: 0.7 10E9/L (ref 0.8–5.3)
LYMPHOCYTES NFR BLD AUTO: 9.1 %
MCH RBC QN AUTO: 29.4 PG (ref 26.5–33)
MCHC RBC AUTO-ENTMCNC: 34.7 G/DL (ref 31.5–36.5)
MCV RBC AUTO: 85 FL (ref 78–100)
MONOCYTES # BLD AUTO: 1 10E9/L (ref 0–1.3)
MONOCYTES NFR BLD AUTO: 13.6 %
NEUTROPHILS # BLD AUTO: 5.7 10E9/L (ref 1.6–8.3)
NEUTROPHILS NFR BLD AUTO: 74.5 %
NITRATE UR QL: NEGATIVE
PH UR STRIP: 7 PH (ref 5–7)
PLATELET # BLD AUTO: 232 10E9/L (ref 150–450)
POTASSIUM SERPL-SCNC: 3.7 MMOL/L (ref 3.4–5.3)
PROT SERPL-MCNC: 7.4 G/DL (ref 6.8–8.8)
RBC # BLD AUTO: 4.72 10E12/L (ref 4.4–5.9)
RBC #/AREA URNS AUTO: NORMAL /HPF (ref 0–2)
SODIUM SERPL-SCNC: 136 MMOL/L (ref 133–144)
SP GR UR STRIP: 1.01 (ref 1–1.03)
TROPONIN I SERPL-MCNC: NORMAL UG/L (ref 0–0.04)
URN SPEC COLLECT METH UR: NORMAL
UROBILINOGEN UR STRIP-ACNC: 0.2 EU/DL (ref 0.2–1)
WBC # BLD AUTO: 7.6 10E9/L (ref 4–11)
WBC #/AREA URNS AUTO: NORMAL /HPF (ref 0–2)

## 2017-02-21 PROCEDURE — 99285 EMERGENCY DEPT VISIT HI MDM: CPT | Mod: 25 | Performed by: FAMILY MEDICINE

## 2017-02-21 PROCEDURE — 96361 HYDRATE IV INFUSION ADD-ON: CPT

## 2017-02-21 PROCEDURE — 93010 ELECTROCARDIOGRAM REPORT: CPT | Performed by: FAMILY MEDICINE

## 2017-02-21 PROCEDURE — 93005 ELECTROCARDIOGRAM TRACING: CPT

## 2017-02-21 PROCEDURE — 81001 URINALYSIS AUTO W/SCOPE: CPT | Performed by: FAMILY MEDICINE

## 2017-02-21 PROCEDURE — 80053 COMPREHEN METABOLIC PANEL: CPT | Performed by: FAMILY MEDICINE

## 2017-02-21 PROCEDURE — 84484 ASSAY OF TROPONIN QUANT: CPT | Performed by: FAMILY MEDICINE

## 2017-02-21 PROCEDURE — 99284 EMERGENCY DEPT VISIT MOD MDM: CPT | Mod: 25

## 2017-02-21 PROCEDURE — 85025 COMPLETE CBC W/AUTO DIFF WBC: CPT | Performed by: FAMILY MEDICINE

## 2017-02-21 PROCEDURE — 96360 HYDRATION IV INFUSION INIT: CPT

## 2017-02-21 PROCEDURE — 25000128 H RX IP 250 OP 636: Performed by: FAMILY MEDICINE

## 2017-02-21 RX ADMIN — SODIUM CHLORIDE 1000 ML: 9 INJECTION, SOLUTION INTRAVENOUS at 14:53

## 2017-02-21 ASSESSMENT — ENCOUNTER SYMPTOMS
SPEECH DIFFICULTY: 0
SEIZURES: 0
FLANK PAIN: 0
NECK PAIN: 0
EYE REDNESS: 0
ABDOMINAL PAIN: 0
UNEXPECTED WEIGHT CHANGE: 0
NERVOUS/ANXIOUS: 0
DIAPHORESIS: 0
CONFUSION: 0
CHILLS: 0
HEMATURIA: 0
DIZZINESS: 1
FEVER: 0
LIGHT-HEADEDNESS: 1
COUGH: 0
FATIGUE: 0
VOMITING: 0
WEAKNESS: 1
SHORTNESS OF BREATH: 0
FREQUENCY: 0
DIARRHEA: 0
DYSURIA: 0
HEADACHES: 0
POLYDIPSIA: 0
VOICE CHANGE: 0
TREMORS: 0
PALPITATIONS: 0
NUMBNESS: 0
SORE THROAT: 0
BACK PAIN: 0

## 2017-02-21 NOTE — ED AVS SNAPSHOT
[facility] Emergency Department    [address] [place] [address]    Phone:  [phone]    Fax:  [phone]                                       Ruddy Browning   MRN: [id number]    Department:  [facility] Emergency Department   Date of Visit:  2/21/2017           After Visit Summary Signature Page     I have received my discharge instructions, and my questions have been answered. I have discussed any challenges I see with this plan with the nurse or doctor.    ..........................................................................................................................................  Patient/Patient Representative Signature      ..........................................................................................................................................  Patient Representative Print Name and Relationship to Patient    ..................................................               ................................................  Date                                            Time    ..........................................................................................................................................  Reviewed by Signature/Title    ...................................................              ..............................................  Date                                                            Time

## 2017-02-21 NOTE — ED PROVIDER NOTES
History     Chief Complaint   Patient presents with     Dizziness     HPI  Ruddy Browning is a 56 year old male with history of obesity, hypertension, hyperlipidemia, GERD with Reyes's changes who presents to the ED feeling dizzy and lightheaded particularly when he bends over forward or sits up quickly. He is having no chest pain or dyspnea on exertion. 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 When he bends over at the waist to pick something off the floor and then stands up he feels very lightheaded sometimes seen stars. He's had no loss of consciousness. He saw his primary care physician for a checkup approximately 1 month ago and had an elevated BUN/creatinine which were suspected to be dehydration. The patient was instructed to drink plenty of fluids and get this rechecked but he never followed up. The patient tries to drink fluids while at work. He does not feel thirsty or dehydrated at this time. He denies any fever or chills. He has never seen any blood in his urine. No history of kidney problems. He's had no trouble with his urine flow. He has been on Prinzide-lisinopril/hydrochlorothiazide 20/25. He usually has ankle edema but has noticed lately that he has less to no edema.    Patient Active Problem List   Diagnosis     Plantar fascial fibromatosis     Mixed hyperlipidemia     Hereditary and idiopathic peripheral neuropathy     Migraine     Essential hypertension, benign     ESOPHAGEAL REFLUX     Varicose veins of lower extremities with complications     Reyes's esophagus     HYPERLIPIDEMIA LDL GOAL <130     Morbid obesity (H)     Tear of medial cartilage or meniscus of Rt knee, current     Edema     Hypertension goal BP (blood pressure) < 140/90     Elevated cholesterol with elevated triglycerides     Past Medical History   Diagnosis Date     Arthritis      Reyes's esophagus with esophagitis 2008      Esophageal reflux 11/27/2006     Gastro-oesophageal reflux disease      Migraine      Morbid obesity (H) 1/22/2009     Obesity, unspecified      Pure hypercholesterolemia      SPRAIN SHOULDER/ARM NOS 12/28/2005     Tear of medial cartilage or meniscus of Rt knee, current 10/17/2013     Unspecified essential hypertension      No current facility-administered medications for this encounter.      Current Outpatient Prescriptions   Medication     sucralfate (CARAFATE) 1 GM tablet     ondansetron (ZOFRAN ODT) 4 MG ODT tab     omeprazole (PRILOSEC) 20 MG CR capsule     pravastatin (PRAVACHOL) 20 MG tablet     lisinopril-hydrochlorothiazide (PRINZIDE,ZESTORETIC) 20-25 MG per tablet     aspirin 81 MG tablet     cetirizine (ZYRTEC) 10 MG tablet     Cholecalciferol (VITAMIN D3 PO)     MULTI VITAMIN MENS OR TABS     SUMAtriptan (IMITREX) 100 MG tablet     Allergies   Allergen Reactions     No Known Drug Allergies      Social History     Social History     Marital status:      Spouse name: Emy     Number of children: 2     Years of education: 16     Occupational History     [employer]     Social History Main Topics     Smoking status: Never Smoker     Smokeless tobacco: Never Used     Alcohol use No      Comment: rare     Drug use: No     Sexual activity: Yes     Partners: Female     Other Topics Concern      Service No     Blood Transfusions No     Caffeine Concern No     Occupational Exposure Yes     [employer]     Hobby Hazards No     Sleep Concern No     Stress Concern No     Weight Concern No     Special Diet No     Back Care No     Exercise Yes     walking at the fitness center and lifting some weight     Bike Helmet Not Asked     N/A     Seat Belt Yes     Self-Exams Not Asked     N/A     Social History Narrative     Past Surgical History   Procedure Laterality Date     Hc ugi endoscopy diag w biopsy  12/5/2008, 03/12/10     Colonoscopy  4/8/2013     Procedure:  "COLONOSCOPY;  Colonoscopy, Esophagogastroduodenoscopy with Single Biopsy;  Surgeon: Eddie Marshall MD;  Location:  GI     Esophagoscopy, gastroscopy, duodenoscopy (egd), combined  4/8/2013     Procedure: COMBINED ESOPHAGOSCOPY, GASTROSCOPY, DUODENOSCOPY (EGD), BIOPSY SINGLE OR MULTIPLE;;  Surgeon: Eddie Marshall MD;  Location:  GI     Open reduction internal fixation wrist Right 2/2016     Rotator cuff repair rt/lt Right 3/2016     Remove nail bed/finger tip Left 5/2016     Esophagoscopy, gastroscopy, duodenoscopy (egd), combined N/A 1/25/2017     Procedure: COMBINED ESOPHAGOSCOPY, GASTROSCOPY, DUODENOSCOPY (EGD), BIOPSY SINGLE OR MULTIPLE;  Surgeon: Kodi Ng MD;  Location:  GI           I have reviewed the Medications, Allergies, Past Medical and Surgical History, and Social History in the Epic system.    Review of Systems   Constitutional: Negative for chills, diaphoresis, fatigue, fever and unexpected weight change.   HENT: Negative for congestion, sore throat and voice change.    Eyes: Negative for redness and visual disturbance.   Respiratory: Negative for cough and shortness of breath.    Cardiovascular: Negative for chest pain and palpitations.   Gastrointestinal: Negative for abdominal pain, diarrhea and vomiting.   Endocrine: Negative for polydipsia and polyuria.   Genitourinary: Negative for dysuria, flank pain, frequency, hematuria and urgency.   Musculoskeletal: Negative for back pain and neck pain.   Skin: Negative for pallor and rash.   Allergic/Immunologic: Negative for immunocompromised state.   Neurological: Positive for dizziness, weakness and light-headedness. Negative for tremors, seizures, syncope, speech difficulty, numbness and headaches.   Psychiatric/Behavioral: Negative for confusion. The patient is not nervous/anxious.        Physical Exam   BP: (!) 89/66  Pulse: 110  Temp: 99.1  F (37.3  C)  Resp: 20  Height: 185.4 cm (6' 1\")  SpO2: 93 %  Physical Exam " "  Constitutional: He is oriented to person, place, and time.   Alert pleasant 56-year-old appears in no distress or any apparent discomfort. BP 98/69  Pulse 107  Temp 99.1  F (37.3  C)  Resp 20  Ht 1.854 m (6' 1\")  SpO2 93%     HENT:   Head: Normocephalic.   Right Ear: External ear normal.   Left Ear: External ear normal.   Nose: Nose normal.   Mouth/Throat: Oropharynx is clear and moist.   Eyes: Conjunctivae and EOM are normal. Pupils are equal, round, and reactive to light.   Neck: Normal range of motion. Neck supple.   Cardiovascular: Normal rate, regular rhythm and normal heart sounds.    Pulmonary/Chest: Effort normal and breath sounds normal.   Abdominal: Soft. Bowel sounds are normal.   Musculoskeletal: Normal range of motion.   Neurological: He is alert and oriented to person, place, and time.   Skin: Skin is warm and dry.   Psychiatric: He has a normal mood and affect. His behavior is normal.   Nursing note and vitals reviewed.      ED Course     ED Course     Procedures                EKG Interpretation:      Interpreted by Manny Zhao   Symptoms at time of EKG: None   Rhythm: Sinus tachycardia  Rate: Tachycardia at 107  Axis: Normal  Ectopy: None  Conduction: Normal  ST Segments/ T Waves: No ST-T wave changes and No acute ischemic changes  Q Waves: None  Comparison to prior: No old EKG available    Clinical Impression: Sinus tachycardia 107    Results for orders placed or performed during the hospital encounter of 02/21/17 (from the past 48 hour(s))   Troponin I   Result Value Ref Range    Troponin I ES  0.000 - 0.045 ug/L     <0.015  The 99th percentile for upper reference range is 0.045 ug/L.  Troponin values in   the range of 0.045 - 0.120 ug/L may be associated with risks of adverse   clinical events.     Comprehensive metabolic panel   Result Value Ref Range    Sodium 136 133 - 144 mmol/L    Potassium 3.7 3.4 - 5.3 mmol/L    Chloride 100 94 - 109 mmol/L    Carbon Dioxide 30 20 - 32 mmol/L    " Anion Gap 6 3 - 14 mmol/L    Glucose 88 70 - 99 mg/dL    Urea Nitrogen 27 7 - 30 mg/dL    Creatinine 2.07 (H) 0.66 - 1.25 mg/dL    GFR Estimate 33 (L) >60 mL/min/1.7m2    GFR Estimate If Black 40 (L) >60 mL/min/1.7m2    Calcium 8.9 8.5 - 10.1 mg/dL    Bilirubin Total 0.4 0.2 - 1.3 mg/dL    Albumin 3.9 3.4 - 5.0 g/dL    Protein Total 7.4 6.8 - 8.8 g/dL    Alkaline Phosphatase 79 40 - 150 U/L    ALT 42 0 - 70 U/L    AST 27 0 - 45 U/L   CBC with platelets differential   Result Value Ref Range    WBC 7.6 4.0 - 11.0 10e9/L    RBC Count 4.72 4.4 - 5.9 10e12/L    Hemoglobin 13.9 13.3 - 17.7 g/dL    Hematocrit 40.1 40.0 - 53.0 %    MCV 85 78 - 100 fl    MCH 29.4 26.5 - 33.0 pg    MCHC 34.7 31.5 - 36.5 g/dL    RDW 13.4 10.0 - 15.0 %    Platelet Count 232 150 - 450 10e9/L    Diff Method Automated Method     % Neutrophils 74.5 %    % Lymphocytes 9.1 %    % Monocytes 13.6 %    % Eosinophils 1.6 %    % Basophils 0.5 %    % Immature Granulocytes 0.7 %    Absolute Neutrophil 5.7 1.6 - 8.3 10e9/L    Absolute Lymphocytes 0.7 (L) 0.8 - 5.3 10e9/L    Absolute Monocytes 1.0 0.0 - 1.3 10e9/L    Absolute Eosinophils 0.1 0.0 - 0.7 10e9/L    Absolute Basophils 0.0 0.0 - 0.2 10e9/L    Abs Immature Granulocytes 0.1 0 - 0.4 10e9/L   UA with Microscopic reflex to Culture   Result Value Ref Range    Color Urine Yellow     Appearance Urine Clear     Glucose Urine Negative NEG mg/dL    Bilirubin Urine Negative NEG    Ketones Urine Negative NEG mg/dL    Specific Gravity Urine 1.010 1.003 - 1.035    pH Urine 7.0 5.0 - 7.0 pH    Protein Albumin Urine Negative NEG mg/dL    Urobilinogen Urine 0.2 0.2 - 1.0 EU/dL    Nitrite Urine Negative NEG    Blood Urine Negative NEG    Leukocyte Esterase Urine Negative NEG    Source Midstream Urine     WBC Urine O - 2 0 - 2 /HPF    RBC Urine O - 2 0 - 2 /HPF         Critical Care time:  none                   Assessments & Plan (with Medical Decision Making)   [facility]--56-year-old male with history of hypertension and  obesity who has been on blood pressure medicine for some time-lisinopril 20/hydrochlorothiazide 25. Lately he has been feeling lightheaded and dizzy especially if he stands up quickly or bends over at the waist. He otherwise is having no trouble when he is exerting himself climbing stairs or hauling pulp upstairs. She was found to be orthostatic and symptomatic here with blood pressure drop and pulse. His urine/creatinine was normal in 8/29/16 at 23/0.98. On 1/17/17 she was seen by his primary care doctor and noted to have a peeling of 39 and a creatinine of 1.66. His PMD felt this was probably dehydration and instructed him to get it rechecked after he was well-hydrated. Today the patient has a BUN of 27 and a creatinine of 2.07. Patient usually has ankle edema which is not present anymore. States his urine is quite frequently dark and was very dark with his first urination here and now is lightly colored after 2 L of IV fluids. Patient feels much improved and less lightheaded and dizzy after 2 L of normal saline. After this fluid challenge his orthostatic changes also completely resolved-normalized. Urinalysis shows no abnormalities or casts. He has no history of kidney disease or underlying medical problem to cause kidney disease. I recommend the patient stop his blood pressure medicine both the lisinopril and hydrochlorothiazide at this time. He needs to follow-up with his regular physician in one week at which time he will probably go back on an antihypertensive minus the diuretic. At this time I am blaming his hydrochlorothiazide for his dehydration-elevated creatinine and symptoms of lightheadedness/dizziness with orthostasis.  I have reviewed the nursing notes.    I have reviewed the findings, diagnosis, plan and need for follow up with the patient.    New Prescriptions    No medications on file       Final diagnoses:   Acute renal failure, unspecified acute renal failure type (H)   Dehydration   Thiazide  diuretics causing adverse effect in therapeutic use, initial encounter   Postural dizziness with presyncope   Orthostatic hypotension       2/21/2017   [facility] EMERGENCY DEPARTMENT     Pavel, Manny TIAN MD  02/21/17 [id number]

## 2017-02-21 NOTE — TELEPHONE ENCOUNTER
He needs to be on something so we can try to see what other PPI is covered.  If his pains continue we can get him into see GI to see if they have other suggestions for his GERD.  What is he using for his headaches and how often is he getting them?    Electronically signed by:  Maikol [place] M.D.  2/21/2017

## 2017-02-21 NOTE — ED AVS SNAPSHOT
[facility] Emergency Department    [address] [place] [address]    Phone:  [phone]    Fax:  [phone]                                       Ruddy Browning   MRN: [id number]    Department:  [facility] Emergency Department   Date of Visit:  2/21/2017           Patient Information     Date Of Birth          1960        Your diagnoses for this visit were:     Acute renal failure, unspecified acute renal failure type (H)     Dehydration     Thiazide diuretics causing adverse effect in therapeutic use, initial encounter     Postural dizziness with presyncope     Orthostatic hypotension        You were seen by PavelManny MD.      Follow-up Information     Follow up with Maikol Allen MD In 1 week.    Specialty:  Family Practice    Contact information:    [facility]  [address] [place] [address] [phone]          Follow up with [facility] Emergency Department.    Specialty:  EMERGENCY MEDICINE    Why:  If symptoms worsen    Contact information:    [address] [place] [address] [phone]    Additional information:    From [facility] [facility] [address]: Exit at Screen on south side of [place]. Turn right on [address]. Turn left at stoplight on [address]. [facility] will be in view two blocks ahead      Discharge References/Attachments     DEHYDRATION (ADULT) (ENGLISH)    KIDNEY INJURY, ACUTE, DISCHARGE INSTRUCTIONS FOR (ENGLISH)    LOW BLOOD PRESSURE (HYPOTENSION), DISCHARGE INSTRUCTIONS (ENGLISH)      24 Hour Appointment Hotline       To make an appointment at any [facility], call [phone] ([phone]). If you don't have a family doctor or clinic, we will help you find one. [facility] are conveniently located to serve the needs of you and your family.             Review of your medicines      Our records show that you are taking the medicines listed below. If these are  incorrect, please call your family doctor or clinic.        Dose / Directions Last dose taken    aspirin 81 MG tablet        ONE DAILY   Refills:  0        cetirizine 10 MG tablet   Commonly known as:  zyrTEC   Dose:  10 mg   Quantity:  90 tablet        Take 1 tablet (10 mg) by mouth daily   Refills:  3        lisinopril-hydrochlorothiazide 20-25 MG per tablet   Commonly known as:  PRINZIDE/ZESTORETIC   Quantity:  180 tablet        TAKE TWO TABLETS BY MOUTH EVERY MORNING   Refills:  2        MULTI vitamin  MENS Tabs        1 tablet daily   Refills:  0        omeprazole 20 MG CR capsule   Commonly known as:  priLOSEC   Quantity:  180 capsule        TAKE ONE CAPSULE BY MOUTH TWICE A DAY   Refills:  2        ondansetron 4 MG ODT tab   Commonly known as:  ZOFRAN ODT   Dose:  4-8 mg   Quantity:  30 tablet        Take 1-2 tablets (4-8 mg) by mouth every 6 hours as needed for nausea   Refills:  0        pravastatin 20 MG tablet   Commonly known as:  PRAVACHOL   Quantity:  90 tablet        TAKE ONE TABLET BY MOUTH ONCE DAILY   Refills:  3        sucralfate 1 GM tablet   Commonly known as:  CARAFATE   Dose:  1 g   Quantity:  40 tablet        Take 1 tablet (1 g) by mouth 2 times daily as needed   Refills:  1        SUMAtriptan 100 MG tablet   Commonly known as:  IMITREX   Dose:  100 mg   Quantity:  9 tablet        Take 1 tablet (100 mg) by mouth at onset of headache for migraine May repeat in 2 hours if headache not gone. Max dose is 200 mg in 24 hours   Refills:  6        VITAMIN D3 PO   Dose:  1000 Units        Take 1,000 Units by mouth daily   Refills:  0                Procedures and tests performed during your visit     Procedure/Test Number of Times Performed    CBC with platelets differential 1    Comprehensive metabolic panel 1    EKG 12-lead, tracing only 1    Orthostatic blood pressure and pulse 2    Troponin I 1    UA with Microscopic reflex to Culture 1      Orders Needing Specimen Collection     None      Pending  Results     No orders found from 2/19/2017 to 2/22/2017.            Pending Culture Results     No orders found from 2/19/2017 to 2/22/2017.            Thank you for choosing [facility]       Thank you for choosing [facility] for your care. Our goal is always to provide you with excellent care. Hearing back from our patients is one way we can continue to improve our services. Please take a few minutes to complete the written survey that you may receive in the mail after you visit with us. Thank you!        [email or web address] Information     [employer] gives you secure access to your electronic health record. If you see a primary care provider, you can also send messages to your care team and make appointments. If you have questions, please call your primary care clinic.  If you do not have a primary care provider, please call [phone] and they will assist you.        Care EveryWhere ID     This is your Care EveryWhere ID. This could be used by other organizations to access your [facility] medical records  [id number]        After Visit Summary       This is your record. Keep this with you and show to your community pharmacist(s) and doctor(s) at your next visit.

## 2017-02-21 NOTE — TELEPHONE ENCOUNTER
Reason for Call:  Other       Detailed comments: Was seen on 1/17/17 and continues to have symptoms of headaches and ulcer pain. Wondering if he should try another medication. Please advise.   Phone Number Patient can be reached at: Home number on file [phone] (home)   Best Time: any     Can we leave a detailed message on this number? YES    Call taken on 2/21/2017 at 10:54 AM by Enriqueta Paul

## 2017-02-22 ENCOUNTER — TELEPHONE (OUTPATIENT)
Dept: FAMILY MEDICINE | Facility: CLINIC | Age: 57
End: 2017-02-22

## 2017-02-22 DIAGNOSIS — I10 BENIGN ESSENTIAL HYPERTENSION: Primary | ICD-10-CM

## 2017-02-22 RX ORDER — LISINOPRIL 10 MG/1
10 TABLET ORAL DAILY
Qty: 90 TABLET | Refills: 3 | Status: SHIPPED | OUTPATIENT
Start: 2017-02-22 | End: 2018-02-09

## 2017-02-22 NOTE — TELEPHONE ENCOUNTER
Talked to patient he is going to  new medication, he will come by next week and be seen on the float schedule to check his BP to make sure it is still good.    Nette Portillo, CMA

## 2017-02-22 NOTE — TELEPHONE ENCOUNTER
I sent a new medication with a lower dose to [facility] for him and we can get him in on the float nurse schedule for a BP check since I may be gone to [place] for a .    Electronically signed by:  Maikol Allen M.D.  2017

## 2017-02-22 NOTE — TELEPHONE ENCOUNTER
Patient seen in the ED yesterday.  Medications have been changed per providers.    Nette Portillo, CMA

## 2017-04-04 DIAGNOSIS — G43.909 MIGRAINE WITHOUT STATUS MIGRAINOSUS, NOT INTRACTABLE, UNSPECIFIED MIGRAINE TYPE: Primary | ICD-10-CM

## 2017-04-04 RX ORDER — SUMATRIPTAN 100 MG/1
100 TABLET, FILM COATED ORAL
Qty: 9 TABLET | Refills: 6 | Status: SHIPPED | OUTPATIENT
Start: 2017-04-04 | End: 2019-01-30

## 2017-04-04 NOTE — TELEPHONE ENCOUNTER
imitrex      Last Written Prescription Date: 06/09/14  Last Fill Quantity: 9, # refills: 6  Last Office Visit with [facility], [facility] or [facility] prescribing provider: 01/17/17       BP Readings from Last 3 Encounters:   02/21/17 112/78   01/25/17 (!) 86/55   01/17/17 120/82     On behalf of [facility]  Cathy Guerrier [facility] [place]

## 2017-04-21 DIAGNOSIS — K22.70 BARRETT'S ESOPHAGUS WITHOUT DYSPLASIA: Primary | ICD-10-CM

## 2017-04-21 NOTE — TELEPHONE ENCOUNTER
Omeprazole 20 twice daily is not covered they request that the patient change to omeprazole 40 daily.  I have changed in epic please review and accept or deny the script.  Thank you   Jana RANGEL

## 2017-04-23 RX ORDER — OMEPRAZOLE 40 MG/1
40 CAPSULE, DELAYED RELEASE ORAL DAILY
Qty: 90 CAPSULE | Refills: 3 | Status: SHIPPED | OUTPATIENT
Start: 2017-04-23 | End: 2018-04-04

## 2017-08-01 DIAGNOSIS — E78.5 HYPERLIPIDEMIA LDL GOAL <130: ICD-10-CM

## 2017-08-01 RX ORDER — PRAVASTATIN SODIUM 20 MG
TABLET ORAL
Qty: 90 TABLET | Refills: 3 | Status: SHIPPED | OUTPATIENT
Start: 2017-08-01 | End: 2018-04-04

## 2017-08-01 NOTE — TELEPHONE ENCOUNTER
Pravastatin      Last Written Prescription Date: 6/22/2016  Last Fill Quantity: 90, # refills: 3  Last Office Visit with [facility] or [facility] prescribing provider: 1/17/2017       Lab Results   Component Value Date    CHOL 180 12/10/2016     Lab Results   Component Value Date    HDL 35 12/10/2016     Lab Results   Component Value Date     12/10/2016     Lab Results   Component Value Date    TRIG 169 12/10/2016     Lab Results   Component Value Date    CHOLHDLRATIO 4.4 06/17/2015

## 2017-08-01 NOTE — TELEPHONE ENCOUNTER
Routing refill request to provider for review/approval because:  Labs out of range:  Fasting lipid panel.     Crystal Martin RN

## 2017-11-14 DIAGNOSIS — G43.909 MIGRAINE HEADACHE: ICD-10-CM

## 2017-11-15 NOTE — TELEPHONE ENCOUNTER
Requested Prescriptions   Pending Prescriptions Disp Refills     SUMAtriptan Succinate Refill (IMITREX) 6 MG/0.5ML SOCT [Pharmacy Med Name: SUMATRIPTAN SUC REF 6MG] 2 mL 6     Sig: INJECT 0.5ML FOR ONE DOSE. MAY REPEAT IN 1 HR, DO NOT EXCEED 2 SHOTS IN 24 HOURS    There is no refill protocol information for this order

## 2017-11-16 RX ORDER — SUMATRIPTAN SUCCINATE 6 MG/.5ML
INJECTION, SOLUTION SUBCUTANEOUS
Qty: 2 ML | Refills: 2 | Status: SHIPPED | OUTPATIENT
Start: 2017-11-16 | End: 2018-11-25

## 2017-11-16 NOTE — TELEPHONE ENCOUNTER
Requested Prescriptions   Pending Prescriptions Disp Refills     SUMAtriptan Succinate Refill (IMITREX) 6 MG/0.5ML SOCT [Pharmacy Med Name: SUMATRIPTAN SUC REF 6MG] 2 mL 6     Sig: INJECT 0.5ML FOR ONE DOSE. MAY REPEAT IN 1 HR, DO NOT EXCEED 2 SHOTS IN 24 HOURS    There is no refill protocol information for this order        Prescription approved per [facility] Refill Protocol.    Crystal Martin RN

## 2017-12-23 ENCOUNTER — HOSPITAL ENCOUNTER (EMERGENCY)
Facility: CLINIC | Age: 57
Discharge: HOME | End: 2017-12-23
Attending: FAMILY MEDICINE | Admitting: FAMILY MEDICINE
Payer: COMMERCIAL

## 2017-12-23 ENCOUNTER — APPOINTMENT (OUTPATIENT)
Dept: ULTRASOUND IMAGING | Facility: CLINIC | Age: 57
End: 2017-12-23
Attending: FAMILY MEDICINE
Payer: COMMERCIAL

## 2017-12-23 VITALS
WEIGHT: 290 LBS | BODY MASS INDEX: 38.43 KG/M2 | TEMPERATURE: 97.6 F | DIASTOLIC BLOOD PRESSURE: 91 MMHG | HEIGHT: 73 IN | RESPIRATION RATE: 16 BRPM | OXYGEN SATURATION: 96 % | SYSTOLIC BLOOD PRESSURE: 137 MMHG

## 2017-12-23 DIAGNOSIS — M25.562 LEFT KNEE PAIN, UNSPECIFIED CHRONICITY: ICD-10-CM

## 2017-12-23 PROCEDURE — 93971 EXTREMITY STUDY: CPT | Mod: LT

## 2017-12-23 PROCEDURE — 99284 EMERGENCY DEPT VISIT MOD MDM: CPT | Mod: Z6 | Performed by: FAMILY MEDICINE

## 2017-12-23 PROCEDURE — 99283 EMERGENCY DEPT VISIT LOW MDM: CPT | Performed by: FAMILY MEDICINE

## 2017-12-23 PROCEDURE — 99284 EMERGENCY DEPT VISIT MOD MDM: CPT | Mod: 25 | Performed by: FAMILY MEDICINE

## 2017-12-23 RX ORDER — IBUPROFEN 600 MG/1
600 TABLET, FILM COATED ORAL EVERY 6 HOURS PRN
Qty: 60 TABLET | Refills: 0 | Status: SHIPPED | OUTPATIENT
Start: 2017-12-23 | End: 2024-02-01

## 2017-12-23 NOTE — ED NOTES
Left knee pain after sitting in a 10 hour classroom a couple weeks ago.  Saw [facility] and had xrays and received cortisone injection with some relief until the next day.  Has been seeing chiropractor with only minimal results.

## 2017-12-23 NOTE — DISCHARGE INSTRUCTIONS
Ibuprofen 600 mg every 6 hours as needed for pain with food.  See Dr. Ayoub in the clinic as scheduled.  Ice or heat may be helpful.  Be careful so you do not fall.  It was nice visiting with both of you this morning.  Have a Anu Nuñez!    Thank you for choosing [facility]. We appreciate the opportunity to meet your urgent medical needs. Please let us know if we could have done anything to make your stay more satisfying.    After discharge, please closely monitor for any new or worsening symptoms. Return to the Emergency Department if you develop any acute worsening signs or symptoms.    If you had lab work, cultures or imaging studies done during your stay, the final results may still be pending. We will call you if your plan of care needs to change. However, if you are not improving as expected, please follow up with your primary care provider or clinic.     Start any prescription medications that were prescribed to you and take them as directed.     Please see additional handouts that may be pertinent to your condition.

## 2017-12-23 NOTE — ED AVS SNAPSHOT
[facility] Emergency Department    [address] [place] [address]    Phone:  [phone]    Fax:  [phone]                                       Ruddy Browning   MRN: [id number]    Department:  [facility] Emergency Department   Date of Visit:  12/23/2017           Patient Information     Date Of Birth          1960        Your diagnoses for this visit were:     Left knee pain, unspecified chronicity        You were seen by Fuentes Acuña MD.      Follow-up Information     Follow up with Rachel Ayoub MD.    Specialty:  Orthopaedic Surgery    Contact information:    [address] [place] [address]  [phone]          Discharge Instructions       Ibuprofen 600 mg every 6 hours as needed for pain with food.  See Dr. Ayoub in the clinic as scheduled.  Ice or heat may be helpful.  Be careful so you do not fall.  It was nice visiting with both of you this morning.  Have a [facility]!    Thank you for choosing [facility]. We appreciate the opportunity to meet your urgent medical needs. Please let us know if we could have done anything to make your stay more satisfying.    After discharge, please closely monitor for any new or worsening symptoms. Return to the Emergency Department if you develop any acute worsening signs or symptoms.    If you had lab work, cultures or imaging studies done during your stay, the final results may still be pending. We will call you if your plan of care needs to change. However, if you are not improving as expected, please follow up with your primary care provider or clinic.     Start any prescription medications that were prescribed to you and take them as directed.     Please see additional handouts that may be pertinent to your condition.          Future Appointments        Provider Department Dept Phone Center    1/3/2018 2:40 PM Rachel Ayoub MD [facility] [phone]  [facility]      24 Hour Appointment Hotline       To make an appointment at any [facility], call [phone] ([phone]). If you don't have a family doctor or clinic, we will help you find one. [facility] clinics are conveniently located to serve the needs of you and your family.             Review of your medicines      CONTINUE these medicines which may have CHANGED, or have new prescriptions. If we are uncertain of the size of tablets/capsules you have at home, strength may be listed as something that might have changed.        Dose / Directions Last dose taken    ibuprofen 600 MG tablet   Commonly known as:  ADVIL/MOTRIN   Dose:  600 mg   What changed:    - medication strength  - when to take this  - reasons to take this   Quantity:  60 tablet        Take 1 tablet (600 mg) by mouth every 6 hours as needed for moderate pain   Refills:  0          Our records show that you are taking the medicines listed below. If these are incorrect, please call your family doctor or clinic.        Dose / Directions Last dose taken    aspirin 81 MG tablet        ONE DAILY   Refills:  0        cetirizine 10 MG tablet   Commonly known as:  zyrTEC   Dose:  10 mg   Quantity:  90 tablet        Take 1 tablet (10 mg) by mouth daily   Refills:  3        lisinopril 10 MG tablet   Commonly known as:  PRINIVIL/ZESTRIL   Dose:  10 mg   Quantity:  90 tablet        Take 1 tablet (10 mg) by mouth daily   Refills:  3        MULTI vitamin  MENS Tabs        1 tablet daily   Refills:  0        omeprazole 40 MG capsule   Commonly known as:  priLOSEC   Dose:  40 mg   Quantity:  90 capsule        Take 1 capsule (40 mg) by mouth daily Take 30-60 minutes before a meal.   Refills:  3        ondansetron 4 MG ODT tab   Commonly known as:  ZOFRAN ODT   Dose:  4-8 mg   Quantity:  30 tablet        Take 1-2 tablets (4-8 mg) by mouth every 6 hours as needed for nausea   Refills:  0        pravastatin 20 MG tablet   Commonly known as:  PRAVACHOL    Quantity:  90 tablet        TAKE ONE TABLET BY MOUTH ONCE DAILY   Refills:  3        sucralfate 1 GM tablet   Commonly known as:  CARAFATE   Dose:  1 g   Quantity:  40 tablet        Take 1 tablet (1 g) by mouth 2 times daily as needed   Refills:  1        * SUMAtriptan 100 MG tablet   Commonly known as:  IMITREX   Dose:  100 mg   Quantity:  9 tablet        Take 1 tablet (100 mg) by mouth at onset of headache for migraine May repeat in 2 hours if headache not gone. Max dose is 200 mg in 24 hours   Refills:  6        * SUMAtriptan Succinate Refill 6 MG/0.5ML Soct   Commonly known as:  IMITREX   Quantity:  2 mL        INJECT 0.5ML FOR ONE DOSE. MAY REPEAT IN 1 HR, DO NOT EXCEED 2 SHOTS IN 24 HOURS   Refills:  2        VITAMIN D3 PO   Dose:  1000 Units        Take 1,000 Units by mouth daily   Refills:  0        * Notice:  This list has 2 medication(s) that are the same as other medications prescribed for you. Read the directions carefully, and ask your doctor or other care provider to review them with you.            Prescriptions were sent or printed at these locations (1 Prescription)                   [facility] [place], [place] - [address]    [address] [facility] [address]    Telephone:  [phone]   Fax:  [phone]   Hours:                  E-Prescribed (1 of 1)         ibuprofen (ADVIL/MOTRIN) 600 MG tablet                Procedures and tests performed during your visit     US Lower Extremity Venous Duplex Left      Orders Needing Specimen Collection     None      Pending Results     Date and Time Order Name Status Description    12/23/2017 0942 US Lower Extremity Venous Duplex Left Preliminary             Pending Culture Results     No orders found from 12/21/2017 to 12/24/2017.            Pending Results Instructions     If you had any lab results that were not finalized at the time of your Discharge, you can call the ED Lab Result RN at [phone]. You will be contacted by  this team for any positive Lab results or changes in treatment. The nurses are available 7 days a week from 10A to 6:30P.  You can leave a message 24 hours per day and they will return your call.        Thank you for choosing [facility]       Thank you for choosing [facility] for your care. Our goal is always to provide you with excellent care. Hearing back from our patients is one way we can continue to improve our services. Please take a few minutes to complete the written survey that you may receive in the mail after you visit with us. Thank you!        [employer] Information     [employer] gives you secure access to your electronic health record. If you see a primary care provider, you can also send messages to your care team and make appointments. If you have questions, please call your primary care clinic.  If you do not have a primary care provider, please call [phone] and they will assist you.        Care EveryWhere ID     This is your Care EveryWhere ID. This could be used by other organizations to access your [facility] medical records  [id number]        Equal Access to Services     KAYLYN CASTRO : Hadii liset Chu, wajamesda cassie, qaybta kaalmaaustin andrade, dara chadwick . So [facility] [phone].    ATENCIÓN: Si habla español, tiene a ross disposición servicios gratuitos de asistencia lingüística. Llame al [phone].    We comply with applicable federal civil rights laws and [place] laws. We do not discriminate on the basis of race, color, national origin, age, disability, sex, sexual orientation, or gender identity.            After Visit Summary       This is your record. Keep this with you and show to your community pharmacist(s) and doctor(s) at your next visit.

## 2017-12-23 NOTE — ED AVS SNAPSHOT
[facility] Emergency Department    [address] [place] [address]    Phone:  [phone]    Fax:  [phone]                                       Ruddy Browning   MRN: [id number]    Department:  [facility] Emergency Department   Date of Visit:  12/23/2017           After Visit Summary Signature Page     I have received my discharge instructions, and my questions have been answered. I have discussed any challenges I see with this plan with the nurse or doctor.    ..........................................................................................................................................  Patient/Patient Representative Signature      ..........................................................................................................................................  Patient Representative Print Name and Relationship to Patient    ..................................................               ................................................  Date                                            Time    ..........................................................................................................................................  Reviewed by Signature/Title    ...................................................              ..............................................  Date                                                            Time

## 2017-12-24 NOTE — ED PROVIDER NOTES
History     Chief Complaint   Patient presents with     Knee Pain     HPI  Ruddy Browning is a 57 year old male who presents to the ED with left knee pain.  2 weeks ago he sat in class all day for about 10 hours.  The following day on Sunday his knee locked up and he could only slightly flex it.  He had pain inferior to the patella and then up medially.  He saw Dr. Sands from orthopedics on Monday had an x-ray which was okay.  Had a cortisone shot and he felt good following day.  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  His pain gets stirred up again then.  The past week the pain is been more constant.  He saw different chiropractor today on the weekend because his usual chiropractor was not open up.  Dr. Rashid this morning suggest that he come to the ED.  Has not noticed any erythema or swelling.  Sometimes he can feel things popping.  He has also had some pain now radiating up the medial thigh into his groin.    Problem List:    Patient Active Problem List    Diagnosis Date Noted     Migraine without status migrainosus, not intractable, unspecified migraine type 04/04/2017     Priority: Medium     Benign essential hypertension 02/22/2017     Priority: Medium     Elevated cholesterol with elevated triglycerides 08/29/2016     Priority: Medium     Edema 12/29/2014     Priority: Medium     Hypertension goal BP (blood pressure) < 140/90 12/29/2014     Priority: Medium     Tear of medial cartilage or meniscus of Rt knee, current 10/17/2013     Priority: Medium     Morbid obesity (H) 02/26/2013     Priority: Medium     HYPERLIPIDEMIA LDL GOAL <130 10/31/2010     Priority: Medium     Reyes's esophagus 03/04/2009     Priority: Medium     Varicose veins of lower extremities with complications 07/17/2007     Priority: Medium     Problem list name updated by automated process. Provider  to review       ESOPHAGEAL REFLUX 2006     Priority: Medium     Essential hypertension, benign 10/26/2004     Priority: Medium     Hereditary and idiopathic peripheral neuropathy 2002     Priority: Medium     Problem list name updated by automated process. Provider to review       Mixed hyperlipidemia 2002     Priority: Medium     Plantar fascial fibromatosis 10/03/2001     Priority: Medium     left          Past Medical History:    Past Medical History:   Diagnosis Date     Arthritis      Reyes's esophagus with esophagitis      Esophageal reflux 2006     Gastro-oesophageal reflux disease      Migraine      Morbid obesity (H) 2009     Obesity, unspecified      Pure hypercholesterolemia      SPRAIN SHOULDER/ARM NOS 2005     Tear of medial cartilage or meniscus of Rt knee, current 10/17/2013     Unspecified essential hypertension        Past Surgical History:    Past Surgical History:   Procedure Laterality Date     COLONOSCOPY  2013    Procedure: COLONOSCOPY;  Colonoscopy, Esophagogastroduodenoscopy with Single Biopsy;  Surgeon: Eddie Marshall MD;  Location: [facility]     ESOPHAGOSCOPY, GASTROSCOPY, DUODENOSCOPY (EGD), COMBINED  2013    Procedure: COMBINED ESOPHAGOSCOPY, GASTROSCOPY, DUODENOSCOPY (EGD), BIOPSY SINGLE OR MULTIPLE;;  Surgeon: Eddie Marshall MD;  Location: [facility]     ESOPHAGOSCOPY, GASTROSCOPY, DUODENOSCOPY (EGD), COMBINED N/A 2017    Procedure: COMBINED ESOPHAGOSCOPY, GASTROSCOPY, DUODENOSCOPY (EGD), BIOPSY SINGLE OR MULTIPLE;  Surgeon: Koid Ng MD;  Location: [facility]     [facility] UGI ENDOSCOPY DIAG W BIOPSY  2008, 03/12/10     OPEN REDUCTION INTERNAL FIXATION WRIST Right 2016     REMOVE NAIL BED/FINGER TIP Left 2016     ROTATOR CUFF REPAIR RT/LT Right 3/2016       Family History:    Family History   Problem Relation Age of Onset     HEART DISEASE Father      alcohol dependant,  at age 52     Lipids Father       "CEREBROVASCULAR DISEASE Brother      had an aneurysm     Arthritis Sister      rheumatiod arthritis     Hypertension Father        Social History:  Marital Status:   [2]  Social History   Substance Use Topics     Smoking status: Never Smoker     Smokeless tobacco: Never Used     Alcohol use 0.0 oz/week     0 Standard drinks or equivalent per week      Comment: rare        Medications:      ibuprofen (ADVIL/MOTRIN) 600 MG tablet   SUMAtriptan Succinate Refill (IMITREX) 6 MG/0.5ML SOCT   pravastatin (PRAVACHOL) 20 MG tablet   omeprazole (PRILOSEC) 40 MG capsule   SUMAtriptan (IMITREX) 100 MG tablet   lisinopril (PRINIVIL/ZESTRIL) 10 MG tablet   sucralfate (CARAFATE) 1 GM tablet   ondansetron (ZOFRAN ODT) 4 MG ODT tab   aspirin 81 MG tablet   cetirizine (ZYRTEC) 10 MG tablet   Cholecalciferol (VITAMIN D3 PO)   MULTI VITAMIN MENS OR TABS         Review of Systems   All other systems reviewed and are negative.      Physical Exam   BP: (!) 137/91  Heart Rate: 76  Temp: 97.6  F (36.4  C)  Resp: 16  Height: 185.4 cm (6' 1\")  Weight: 131.5 kg (290 lb)  SpO2: 96 %      Physical Exam   Constitutional: He appears well-developed and well-nourished. No distress.   Pulmonary/Chest: Effort normal. No respiratory distress.   Musculoskeletal:        Left knee: He exhibits no swelling, no effusion, no LCL laxity and no MCL laxity (but mild pain with valgus stress). Tenderness (ant/inf/medial) found. MCL tenderness noted.        Left ankle: Normal.        Left upper leg: He exhibits no swelling.        Legs:  He has a slightly prominent varicosity in the right medial thigh.       ED Course    He has no knee effusion.  Suspect he has a meniscus injury.  He does have some pain up the medial thigh and his pain started back 2 weeks ago when he was sitting for about 10 hours in the classroom.  Ultrasound of the left lower extremity was obtained to rule out DVT and that was normal.      He would like to use just ibuprofen.  Does well " 600 mg tablets.  He is considering switching his orthopedic care to Dr. Ayoub.  She operated on a relative and they were very impressed with her bedside manner.  It would also be closer as Dr. Sands would make them go to [facility] which is much further away for them.     ED Course     Procedures              Assessments & Plan (with Medical Decision Making)    (M25.562) Left knee pain, unspecified chronicity  Comment:   Plan: ibuprofen (ADVIL/MOTRIN) 600 MG tablet        We were able to schedule him for an orthopedic appointment with Dr. Ayoub on January 3, 2018        I have reviewed the nursing notes.    I have reviewed the findings, diagnosis, plan and need for follow up with the patient.       Discharge Medication List as of 12/23/2017 11:16 AM          Final diagnoses:   Left knee pain, unspecified chronicity       12/23/2017   [facility] EMERGENCY DEPARTMENT     Fuentes Acuña MD  12/23/17 [id number]

## 2018-02-07 ENCOUNTER — RADIANT APPOINTMENT (OUTPATIENT)
Dept: GENERAL RADIOLOGY | Facility: CLINIC | Age: 58
End: 2018-02-07
Attending: ORTHOPAEDIC SURGERY
Payer: COMMERCIAL

## 2018-02-07 ENCOUNTER — OFFICE VISIT (OUTPATIENT)
Dept: ORTHOPEDICS | Facility: CLINIC | Age: 58
End: 2018-02-07
Payer: COMMERCIAL

## 2018-02-07 VITALS — WEIGHT: 288 LBS | BODY MASS INDEX: 38.17 KG/M2 | TEMPERATURE: 97.9 F | HEIGHT: 73 IN

## 2018-02-07 DIAGNOSIS — M17.12 PRIMARY OSTEOARTHRITIS OF LEFT KNEE: Primary | ICD-10-CM

## 2018-02-07 DIAGNOSIS — M25.562 LEFT KNEE PAIN: ICD-10-CM

## 2018-02-07 PROCEDURE — 20610 DRAIN/INJ JOINT/BURSA W/O US: CPT | Mod: LT | Performed by: ORTHOPAEDIC SURGERY

## 2018-02-07 PROCEDURE — 73562 X-RAY EXAM OF KNEE 3: CPT | Mod: TC

## 2018-02-07 PROCEDURE — 99203 OFFICE O/P NEW LOW 30 MIN: CPT | Mod: 25 | Performed by: ORTHOPAEDIC SURGERY

## 2018-02-07 RX ORDER — MELOXICAM 15 MG/1
15 TABLET ORAL DAILY
Qty: 60 TABLET | Refills: 1 | Status: SHIPPED | OUTPATIENT
Start: 2018-02-07 | End: 2018-03-12

## 2018-02-07 ASSESSMENT — PAIN SCALES - GENERAL: PAINLEVEL: MILD PAIN (2)

## 2018-02-07 NOTE — LETTER
2/7/2018         RE: Ruddy Browning  [address] [address] [place] [id number]        Dear Colleague,    Thank you for referring your patient, Ruddy Browning, to the [facility]. Please see a copy of my visit note below.    Ruddy Browning is a 57 year old male who is seen in consultation at the request of ED.  History of Present illness:  Ruddy presents for evaluation of:  1.) left knee  2.)   Onset: mid, December and 2017  Symptoms brought on by sitting to long.               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  He enjoys 4 wheeling jeep driving shooting guns and has a vending trailer doing [id number].    He is being seen for   Chief Complaints and History of Present Illnesses   Patient presents with     Consult     left knee pain          History of Present Illness:   Mechanism of Injury: No specific injury recently.  Patient did have his knee lock up on 12/23/2017.  He was able to move it and it got better.  He did go into the emergency department on that day.  Location: Left medial anterior portion of the knee  Duration of Pain: On and off for approximately 2 months  Rating of Pain: Mild to moderate  Pain Quality: Achy but can be sharp at times.  Pain is better with: Rest  Treatment so far consists of: Rest or ice and heat, chiropractic helps somewhat as well as ibuprofen.  Patient did have a cortisone injection done by Dr. Sands on 12/11/2017 and that worked for about 1-1-1/2 days.  Patient has not had formal physical therapy and has not had home exercise program and has not had gel injections as of yet.  Pain is worse with: Activity and stairs and ladders.  Associated Features: None.  Patient denies numbness or tingling.  Prior history of related problems: Patient had his left knee drained before.  He has never had major trauma or surgery on his left knee.  Patient's  right knee had a torn meniscus in 2013 and he had meniscectomy surgery done by Dr. Sands and he got better.  He does not feel his left knee feels the same as his right knee did.  Pain is Limiting: Activity although he is working through the pain.  Here to: Orthopedic consultation, second opinion  The Pain Has: Been about the same  Additional History: 1 patient was seen in the emergency department on 12/23/2017 he did get an ultrasound that was negative for DVT.  He also had x-rays that show some arthritis.  Patient just wants to continue working.    Patient's past medical, surgical, social and family histories reviewed.     Past Medical History:   Diagnosis Date     Arthritis      Reyes's esophagus with esophagitis 2008     Esophageal reflux 11/27/2006     Gastro-oesophageal reflux disease      Migraine      Morbid obesity (H) 1/22/2009     Obesity, unspecified      Pure hypercholesterolemia      SPRAIN SHOULDER/ARM NOS 12/28/2005     Tear of medial cartilage or meniscus of Rt knee, current 10/17/2013     Unspecified essential hypertension          Past Surgical History:   Procedure Laterality Date     COLONOSCOPY  4/8/2013    Procedure: COLONOSCOPY;  Colonoscopy, Esophagogastroduodenoscopy with Single Biopsy;  Surgeon: Eddie Marshall MD;  Location: [facility]     ESOPHAGOSCOPY, GASTROSCOPY, DUODENOSCOPY (EGD), COMBINED  4/8/2013    Procedure: COMBINED ESOPHAGOSCOPY, GASTROSCOPY, DUODENOSCOPY (EGD), BIOPSY SINGLE OR MULTIPLE;;  Surgeon: Eddie Marshall MD;  Location: [facility]     ESOPHAGOSCOPY, GASTROSCOPY, DUODENOSCOPY (EGD), COMBINED N/A 1/25/2017    Procedure: COMBINED ESOPHAGOSCOPY, GASTROSCOPY, DUODENOSCOPY (EGD), BIOPSY SINGLE OR MULTIPLE;  Surgeon: Kodi Ng MD;  Location:  GI      UGI ENDOSCOPY DIAG W BIOPSY  12/5/2008, 03/12/10     OPEN REDUCTION INTERNAL FIXATION WRIST Right 2/2016     REMOVE NAIL BED/FINGER TIP Left 5/2016     ROTATOR CUFF REPAIR RT/LT Right 3/2016        Medications:    Current Outpatient Prescriptions on File Prior to Visit:  ibuprofen (ADVIL/MOTRIN) 600 MG tablet Take 1 tablet (600 mg) by mouth every 6 hours as needed for moderate pain   SUMAtriptan Succinate Refill (IMITREX) 6 MG/0.5ML SOCT INJECT 0.5ML FOR ONE DOSE. MAY REPEAT IN 1 HR, DO NOT EXCEED 2 SHOTS IN 24 HOURS   pravastatin (PRAVACHOL) 20 MG tablet TAKE ONE TABLET BY MOUTH ONCE DAILY   omeprazole (PRILOSEC) 40 MG capsule Take 1 capsule (40 mg) by mouth daily Take 30-60 minutes before a meal.   SUMAtriptan (IMITREX) 100 MG tablet Take 1 tablet (100 mg) by mouth at onset of headache for migraine May repeat in 2 hours if headache not gone. Max dose is 200 mg in 24 hours   lisinopril (PRINIVIL/ZESTRIL) 10 MG tablet Take 1 tablet (10 mg) by mouth daily   sucralfate (CARAFATE) 1 GM tablet Take 1 tablet (1 g) by mouth 2 times daily as needed   ondansetron (ZOFRAN ODT) 4 MG ODT tab Take 1-2 tablets (4-8 mg) by mouth every 6 hours as needed for nausea   aspirin 81 MG tablet ONE DAILY   cetirizine (ZYRTEC) 10 MG tablet Take 1 tablet (10 mg) by mouth daily   Cholecalciferol (VITAMIN D3 PO) Take 1,000 Units by mouth daily   MULTI VITAMIN MENS OR TABS 1 tablet daily     No current facility-administered medications on file prior to visit.     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     Social History Main Topics     Smoking status: Never Smoker     Smokeless tobacco: Never Used     Alcohol use 0.0 oz/week     0 Standard drinks or equivalent per week      Comment: rare     Drug use: No     Sexual activity: Yes     Partners: Female       Family History   Problem Relation Age of Onset     HEART DISEASE Father      alcohol dependant,  at age 52     Lipids Father      CEREBROVASCULAR DISEASE Brother      had an aneurysm     Arthritis Sister      rheumatiod arthritis     Hypertension Father   "      REVIEW OF SYSTEMS  10 point review systems performed otherwise negative as noted as per history of present illness.    Physical Exam:  Vitals: Temp 97.9  F (36.6  C) (Temporal)  Ht 1.854 m (6' 1\")  Wt 130.6 kg (288 lb)  BMI 38 kg/m2  BMI= Body mass index is 38 kg/(m^2).    Constitutional: healthy, alert and no acute distress   Psychiatric: mentation appears normal and affect normal/bright  NEURO: no focal deficits, CMS intact left lower extremity  RESP: Normal with easy respirations and no use of accessory muscles to breathe, no audible wheezing or retractions  CV: Calf soft and nontender to palpation.  Leg is warm.  SKIN: No erythema, rashes, excoriation, or breakdown. No evidence of infection.   MUSCULOSKELETAL:    INSPECTION of left knee: No gross deformities, erythema, edema, ecchymosis, atrophy or fasciculations.     PALPATION: No tenderness on palpation of the medial, lateral, anterior and posterior portion of the knee. No specific joint line tenderness. No increased warmth.  No effusion    ROM: Extension full, flexion to approximately 125 . All range of motion without catching, locking or pain.     STRENGTH: 5 out of 5 quad and hamstring strength.     SPECIAL TEST: Patient has a negative Lachman's negative drawer sign. Patient's knee is stable to varus and valgus stress at 30  of flexion. Patient has a negative Nichole's.  Patient could even do the Thessaly test without major pain.  GAIT: non-antalgic  Lymph: no palpable lymph nodes    Diagnostic Modalities:  Recent Results (from the past 744 hour(s))   XR Knee Left 3 Views    Narrative    KNEE LEFT THREE VIEWS    2/7/2018 3:53 PM     HISTORY: Left knee pain    COMPARISON: Right knee was provided for comparison on the AP and  sunrise views.    FINDINGS: Moderate to severe medial compartment space loss of the left  knee is noted. This is also seen in the contralateral right knee to a  greater extent. The lateral and patellofemoral compartment " joint  spaces are grossly maintained. No fracture or malalignment. There is a  small left knee joint effusion.      Impression    IMPRESSION:  1. Moderate to severe medial compartment degenerative joint disease of  bilateral knees.  2. Small left knee joint effusion.  3. No fracture.    TWYLA LEAL MD     I agree with the above reading.  Independent visualization of the images was performed.    Impression: 1.  Left knee degenerative joint disease    Plan:  All of the above pertinent physical exam and imaging modalities findings was reviewed with Ruddy and his wife.                                          INJECTION PROCEDURE:  The patient was counseled about an  injection, including discussion of risks (including infection), contents of the injection, rationale for performing the injection, and expected benefits of the injection. The skin was prepped with alcohol and betadine and then utilizing sterile technique an injection of the left knee joint from the anterolateral approach in the seated position was performed. I used teresita chloride spray prior to doing the injection. The injection consisted 1ml of Kenalog (40mg per 1ml) with 8ml 1% lidocaine plain. The patient tolerated the injection well, and there were no complications. The injection site was covered with a Band-Aid. The injection was performed by Dedrick Yanez PA-C      Patient Instructions:  1. Xrays show us degenerative joint disease of the inside part of the knee and under the knee cap also.  2. We had to try and figure out if this is the arthritis or the meniscus and after careful examination we feel its the arthritis.  3. We decided to do a cortisone injection today.  This is like putting very powerful ibuprofen right to the area it is needed.  We have information about the injection below.   4. We had you look at some braces today also.  5. I ordered Mobic 15mg once a day times 2 weeks, then take as needed.  Stop this medication if you have any  abdominal pain with it.  I sent this to your pharmacy.      6.  We have information about arthritis and alternative methods as well as exercises below that you can try.  In the future you could also try formal physical therapy if you wanted.  7.You can followup with Rachel Ayoub MD in 6 weeks, if you are having pain at that time we can do a cortisone injection.  You can always cancel the appointment if you are doing really well and follow-up as needed.   Re-x-ray on return: No    Scribed by Lisandro Yanez PA-C on 2/7/2018 at 4:51 PM, based on Dr. Rachel Ayoub's statements to me.    This note was dictated with Ksplice.    JOSEPH Alston MD      Again, thank you for allowing me to participate in the care of your patient.        Sincerely,        Rachel Ayoub MD

## 2018-02-07 NOTE — MR AVS SNAPSHOT
After Visit Summary   2/7/2018    Ruddy Browning    MRN: [id number]           Patient Information     Date Of Birth          1960        Visit Information        Provider Department      2/7/2018 3:50 PM Rachel Ayoub MD [facility]        Today's Diagnoses     Primary osteoarthritis of left knee    -  1      Care Instructions    Encounter Diagnosis   Name Primary?     Primary osteoarthritis of left knee Yes     Rest, ice and elevate above heart level as needed for pain control  1. Xrays show us degenerative joint disease of the inside part of the knee and under the knee cap also.  2. We had to try and figure out if this is the arthritis or the meniscus and after careful examination we feel its the arthritis.  3. We decided to do a cortisone injection today.  This is like putting very powerful ibuprofen right to the area it is needed.  We have information about the injection below.   4. We had you look at some braces today also.  5. I ordered Mobic 15mg once a day times 2 weeks, then take as needed.  Stop this medication if you have any abdominal pain with it.  I sent this to your pharmacy.      6.  We have information about arthritis and alternative methods as well as exercises below that you can try.  In the future you could also try formal physical therapy if you wanted.  7.You can followup with Rachel Ayoub MD in 6 weeks, if you are having pain at that time we can do a cortisone injection.  You can always cancel the appointment if you are doing really well and follow-up as needed.   Cortisone Instructions:     1. You received an injection of cortisone into your left knee today.  2. The joint(s) may be more painful for the first 1-2 days.  3. We ask you to continue to rest the joint(s) for a few more days before resuming regular activities.  4. Pain Medications you can take (as long as your primary care provider allows these meds and you do not have kidney or  liver conditions):  Tylenol  Take 1000 mg by mouth every 6 hours as needed; maximum dose 4000 mg a day  Ibuprofen  600 mg every 6 hours as needed; maximum 2400 mg a day  (OK to take tylenol and ibuprofen at the same time)  5. Rest, ice and elevate as needed for pain control  6. Watch for these signs of infection: redness, swelling, drainage, warmth to touch, increased pain, or fever. Call the clinic or make an appointment to be seen if you think you have an infection.  7. If you are diabetic, make sure you keep a close eye on your blood sugars, they can get elevated with cortisone injections.   8. Sometimes it can take 1-2 weeks for it to reach its full effect.    Cortisone Injections  Cortisone is a type of steroid. It can greatly reduce swelling, redness, and irritation (inflammation) and pain. Being injected with cortisone is simple and doesn t take long. Your doctor may ask you questions about your health. Certain health conditions, such as diabetes, can be affected by cortisone.     Your pain may be relieved by a cortisone injection.   Why have a cortisone injection?  Injecting cortisone can relieve pain for anything from a sports injury to arthritis. Your doctor may suggest an injection if rest, splints, or oral medicine doesn t relieve your pain. Injecting cortisone is simpler than having surgery. And cortisone may provide the lasting pain relief that can help you get out and enjoy life again.  Getting the injection  Your doctor will start by cleaning and occasionally numbing your skin at the injection site. Next you ll be injected with local anesthetics (for short-term pain relief) and cortisone. The injection may last a few moments. A small bandage will be put over the injection site. You ll then be ready to go home.  After your injection  After being injected, make sure you don t injure the treated region. But stay active. Enjoy a walk or some other mild activity. Just be careful not to strain the region  that gave you trouble.  The next day  Some people feel more pain after being injected. This is normal, and it will go away soon. Applying ice for 20 minutes at a time to your injury may reduce the increased pain. Rest for the first day or two. You don t need to stay in bed. But avoid tasks that may strain the injured region.  If you have diabetes  Cortisone injections can cause blood sugar to be increased for several days after the injection. If you have diabetes, you should follow your blood  sugar closely during this time. Follow your regular plan for what to do when your blood sugar is elevated.     4539-3364 [employer] [address] [place] [address]. All rights reserved. This information is not intended as a substitute for professional medical care. Always follow your healthcare professional's instructions.       Understanding Osteoarthritis of the Knee    A joint is a place where two bones meet. The knee is called a hinge joint. This joint is formed where the thighbone (femur) meets the shinbone (tibia). A healthy knee joint bends freely. Knee osteoarthritis is a condition where parts of the knee joint wear out. This can lead to pain, stiffness, and limited movement.   What is osteoarthritis?  Every joint contains a smooth tissue called cartilage. Cartilage cushions the ends of bones and helps bones in a joint glide smoothly against each other. Knee osteoarthritis occurs when cartilage in the knee joint begins to break down and wear away. Bones may become exposed and rub together. The cartilage may become irritated and rough. This prevents smooth movement of the joint and can lead to pain.  Causes of osteoarthritis of the knee  Causes can include:    Wear and tear from normal use over time    Overuse of the knee during sports or work activities    Being overweight. This increases stress on the knee joint.    Misalignment of the knee joint    Injury to the knee  Symptoms of  osteoarthritis of the knee  Common symptoms include:    Pain and swelling around the joint. The pain and swelling get worse with activity and better with rest.    Grinding sound when moving the knee    Reduced knee movement    Knee stiffness. This is often worse first thing in the morning.  Treating osteoarthritis of the knee  Osteoarthritis is a long-term condition. Treatment usually focuses on managing symptoms. Treatment may include:    Over-the-counter or prescription medicines taken by mouth to help relieve pain and swelling    Injections of medicine into the joint to help relieve symptoms for a time    A weight-loss plan for people who are overweight    A plan of physical therapy and exercises to improve the strength and flexibility of the muscles around the knee    Heat or cold therapy to help relieve pain and stiffness    Assistive devices that help with movement, such as a cane or a walker    Assistive devices that make activities of daily life easier, such as raised toilet seats or shower bars  If other treatments don t do enough to relieve symptoms, you may need surgery to replace the joint. During this surgery, the damaged joint is removed. An artificial knee joint is then put into place. This can help relieve pain and stiffness and restore movement of the knee.      When to call your healthcare provider  Call your healthcare provider right away if you have any of these:    Fever of 100.4 F (38 C) or higher, or as directed    Symptoms that don t get better with prescribed medicines or get worse    New symptoms   Date Last Reviewed: 3/10/2016    1102-6337 [employer]. [address] [place] [address]. All rights reserved. This information is not intended as a substitute for professional medical care. Always follow your healthcare professional's instructions.      Osteoarthritis: Natural and Alternative Treatments  The treatment for osteoarthritis includes lifestyle changes like  weight loss and exercise. Medications and surgery may also be part of the treatment. There are also many natural and alternative treatments. These treatments may also help relieve pain and stiffness caused by osteoarthritis.  Heat and cold  Using heat and cold treatments are simple ways to lessen arthritis symptoms.    Heat soothes stiff joints and tired muscles. Heat works well before exercise, for example. Heat treatments include:    A warm shower or baths, or soak (for example, fill the sink with warm water and move your fingers, hands, and wrists around in the water)    A moist heating pad    A warm, moist wash cloth    An electric blanket or throw    Cold treatments help to numb painful areas and decrease swelling. Cold treatments include the followinig wrapped in a thin towel:    An ice pack or bag of ice    A gel-filled cold pack    A bag of frozen vegetables, like peas or corn  Be careful when using heat or cold. You can injure your skin. Each treatment should only last for 10 to 20 minutes. Your health care provider or therapist can give you specific instructions.    Meditation and relaxation  Meditation and relaxation can help you deal with arthritis pain. There are many different methods available including deep breathing exercises, meditation, and yoga. Look for information and programs on the Internet or in your community. Or try this simple deep breathing technique sometimes called belly breathin. Sit in a comfortable chair or lie on your back.   2. Put one hand on your chest and the other hand on your abdomen.  3. Take a breath in through your nose. The hand on your stomach should rise. The hand on your chest should move very little.  4. Breathe out through your mouth, pushing out as much air as you can. The hand on your stomach should move in as you breathe out, but the hand on your chest should move very little.You should feel the muscles of your abdomen tighten.   5. Continue to breathe in  through your nose and out through your mouth. You should feel your abdomen rise and fall. Count slowly each time you breathe out.  Acupuncture  Acupuncture is a 2000-year old practice. Practitioners insert thin needles in specific parts of the body. Research shows that it can help to relieve the pain of arthritis.   For more information or to find a practitioner in your area, contact the American Academy of Medical Acupuncture. Their website is: http://www.medicalacupuncture.org/.  Massage    Therapeutic massage has many benefits. It may:    Help you and your muscles relax    Improve blood flow to muscles and joints    Help joints stay more flexible.  Look for a certified massage therapist. Many are trained to treat sore muscles and joint pain and stiffness.  Vitamins, supplements, and herbs  People with arthritis, or other long-term conditions that cause pain, often look for alternative ways to lessen pain. Vitamins, supplements, and herbs may or may not help you to feel better. Before you try any vitamin, supplement, or herb, make sure you ask your health care provider or pharmacist. Although they can lessen pain and stiffness, they may:    Not actually do what they claim    Have serious side effects    Cause other medical problems to worsen    Interact with other medications     Delay your getting proven medical treatment    Cost a lot of money  Get the facts about supplements on the Arthritis Foundation website. It is: http://www.arthritis.org.  Psychological treatments  Research shows that many psychological therapies or those that deal with thinking and emotions, help people cope with arthritis pain. Therapies include: cognitive-behavioral therapy (CBT), pain coping skills training, biofeedback, stress management, and hypnosis. Ask your health care provider for more information about these therapies.  For more information about many of these methods contact the National Center for Complementary and Alternative  Medicine ([facility]). Website: http://www.[facility].nih.gov.    3567-3203 [employer]. [address] [place] [address]. All rights reserved. This information is not intended as a substitute for professional medical care. Always follow your healthcare professional's instructions.         Hamstring Stretch (Flexibility)    1. Sit on the floor with your right leg straight in front of you. Bend your left leg so the sole of your foot rests against the inside of your right thigh.  2. Reach toward your ankle. Keep your knee, neck, and back straight. Feel the stretch in the back of your thigh.  3. Hold for 30 to 60 seconds. Repeat 2 times, or as instructed.  4. Switch legs and repeat.  5. Repeat this exercise 3 times per day, or as instructed.      Tip: Don t bounce while you re stretching.   Date Last Reviewed: 3/10/2016    7792-4704 [employer] [address] [place] [address]. All rights reserved. This information is not intended as a substitute for professional medical care. Always follow your healthcare professional's instructions.       Quadriceps Stretch (Flexibility)    6. Stand up straight and hold onto a wall, sturdy chair, railing, or table with your right hand.  7. Bend your left leg at the knee behind you, and grab your ankle with your left hand. Pull your left heel toward your buttocks. Don t arch your back.  8. Hold for 30 to 60 seconds. Repeat 2 times.  9. Switch legs and repeat.  Date Last Reviewed: 5/1/2016 2000-2017 [employer] [address] [place] [address]. All rights reserved. This information is not intended as a substitute for professional medical care. Always follow your healthcare professional's instructions.      Wall Squats for ACL Healing    After you regain muscle control, it s time to build strength. This helps you put full weight on your leg. For best results, warm up and stretch before starting. If your injury is recent,  wait until swelling and pain decrease before doing this exercise:    Lean against a wall with your feet hip-width apart. Your feet should be about 18 inches from the wall.    Slowly slide down to a near-sitting position. Don t let your knees go past 90 degrees.    Hold for 10 seconds, then slide back up.    Repeat 5 times.     CAUTION: Do this exercise only if your healthcare provider says it s OK.     8727-8580 [employer]. [address] [place] [address]. All rights reserved. This information is not intended as a substitute for professional medical care. Always follow your healthcare professional's instructions.        Leg and Knee Exercises: Leg Raise    This exercise is designed to stretch and strengthen your knee. Before beginning, read through all the instructions. While exercising, breathe normally and use smooth movements. If you feel any pain, stop the exercise. If pain persists, call your healthcare provider.  Caution    Don t arch your back.    Don t hunch your shoulders.     Sit on the floor with your_________ leg straight, the other bent.    Tighten the thigh muscles on the top of your straight leg. You should feel the muscles contract. Raise that leg 6-8 inches. Then lower it slowly and steadily to the floor. Relax.    Repeat ______ times.  Do ______ sets a day.    7155-7405 [employer]. [address] [place] [address]. All rights reserved. This information is not intended as a substitute for professional medical care. Always follow your healthcare professional's instructions.        Quad Set for Leg and Knee    This exercise is designed to stretch and strengthen your knee. Before beginning, read through all the instructions. While exercising, breathe normally and use smooth movements. If you feel any pain, stop the exercise. If pain persists, call your healthcare provider.  1.  Sit on the floor with one leg straight, the other bent.  2.  Flex the foot of  your straight leg by pointing your toes toward you. Press the back of your knee into the floor while tightening the muscle on the top of your thigh. Hold for ______ seconds. Then relax.  3.  Repeat ______ times. Do ______ sets a day.  Caution    Don t arch your back.    Don t hunch your shoulders.    0853-5195 [employer]. [address] [place] [address], [place] [address]. All rights reserved. This information is not intended as a substitute for professional medical care. Always follow your healthcare professional's instructions.      [employer] and [employer] may offer reliable information regarding your diagnosis and treatment plan.    THANK YOU for coming in today. If you receive a survey via At[employer] or mail please let us know if there was anything you especially appreciated today or if there is any way we can improve our clinic. We appreciate your input.    GENERAL INFORMATION:  Our hours are:  Monday :     Clinic 7:30 AM-430 PM ([facility])  Tuesday:      Operating Room All Day ([facility])  Wednesday: Clinic 7:30 AM - 11:15 AM ([facility])             Clinic 1:00 PM - 4:00PM ([facility])  Thursday:     Administrative Day  Friday:          Clinic 7:30 AM - 11:15 AM ([facility])            Clinic 1:00 PM - 4:00 PM ([facility])      Bone and Joint Service Line for any issues or concerns: [phone]      We are not in the office Thursdays. Therefore non- urgent calls and medical messages received on Thursday will be addressed when we are back in the office on Wednesday. Urgent matters will be reviewed and addressed by one of our partners in the office as needed.    If lab work was done today as part of your evaluation you will generally be contacted via Atmos[employer]iq, mail, or phone with the results within 1-5 days. If there is an alarming result we will contact you by phone. Lab  "results come back at varying times, I generally wait until all labs are resulted before making comments on results. Please note labs are automatically released to [employer] (if you have signed up for it) once available-at times you may see these prior to my having a chance to review them as well.    If you need refills please contact your pharmacist. They will send a refill request to me to review. Please allow 3 business days for us to process all refill requests. All narcotic refills should be handled in the clinic at the time of your visit.             Follow-ups after your visit        Who to contact     If you have questions or need follow up information about today's clinic visit or your schedule please contact [facility] directly at [phone].  Normal or non-critical lab and imaging results will be communicated to you by [employer], letter or phone within 4 business days after the clinic has received the results. If you do not hear from us within 7 days, please contact the clinic through [employer] or phone. If you have a critical or abnormal lab result, we will notify you by phone as soon as possible.  Submit refill requests through [employer] or call your pharmacy and they will forward the refill request to us. Please allow 3 business days for your refill to be completed.          Additional Information About Your Visit        Sk[employer]l Information     [employer]l lets you send messages to your doctor, view your test results, renew your prescriptions, schedule appointments and more. To sign up, go to [email or web address] . Click on \"Log in\" on the left side of the screen, which will take you to the Welcome page. Then click on \"Sign up Now\" on the right side of the page.     You will be asked to enter the access code listed below, as well as some personal information. Please follow the directions to create your username and password.     Your access code is: [email or web address]  Expires: 5/8/2018  4:49 PM   " "  Your access code will  in 90 days. If you need help or a new code, please call your [facility] clinic or [phone].        Care EveryWhere ID     This is your Care EveryWhere ID. This could be used by other organizations to access your [facility] medical records  [id number]        Your Vitals Were     Temperature Height BMI (Body Mass Index)             97.9  F (36.6  C) (Temporal) 1.854 m (6' 1\") 38 kg/m2          Blood Pressure from Last 3 Encounters:   17 (!) 137/91   17 112/78   17 (!) 86/55    Weight from Last 3 Encounters:   18 130.6 kg (288 lb)   17 131.5 kg (290 lb)   17 133.8 kg (295 lb)                 Today's Medication Changes          These changes are accurate as of 18  4:49 PM.  If you have any questions, ask your nurse or doctor.               Start taking these medicines.        Dose/Directions    meloxicam 15 MG tablet   Commonly known as:  MOBIC   Used for:  Primary osteoarthritis of left knee   Started by:  Rachel Ayoub MD        Dose:  15 mg   Take 1 tablet (15 mg) by mouth daily   Quantity:  60 tablet   Refills:  1            Where to get your medicines      These medications were sent to [address] - [address]  [address], [facility] [address]     Phone:  [phone]     meloxicam 15 MG tablet                Primary Care Provider Office Phone # Fax #    Maikol Allen [phone]       [address] [facility]   [facility] [place] [address]        Equal Access to Services     [facility] Center[facility] AH: Hadsoumya Chu, maryse matthews, qadara núñez. So [facility] [phone].    ATENCIÓN: Si habla español, tiene a ross disposición servicios gratuitos de asistencia lingüística. Llame al [phone].    We comply with applicable federal civil rights laws and [place] laws. We do not discriminate on the basis of race, color, national origin, age, " disability, sex, sexual orientation, or gender identity.            Thank you!     Thank you for choosing [facility]  for your care. Our goal is always to provide you with excellent care. Hearing back from our patients is one way we can continue to improve our services. Please take a few minutes to complete the written survey that you may receive in the mail after your visit with us. Thank you!             Your Updated Medication List - Protect others around you: Learn how to safely use, store and throw away your medicines at www.disposemymeds.org.          This list is accurate as of 2/7/18  4:49 PM.  Always use your most recent med list.                   Brand Name Dispense Instructions for use Diagnosis    aspirin 81 MG tablet      ONE DAILY    Essential hypertension, benign, Mixed hyperlipidemia       cetirizine 10 MG tablet    zyrTEC    90 tablet    Take 1 tablet (10 mg) by mouth daily    Seasonal allergies       ibuprofen 600 MG tablet    ADVIL/MOTRIN    60 tablet    Take 1 tablet (600 mg) by mouth every 6 hours as needed for moderate pain    Left knee pain, unspecified chronicity       lisinopril 10 MG tablet    PRINIVIL/ZESTRIL    90 tablet    Take 1 tablet (10 mg) by mouth daily    Benign essential hypertension       meloxicam 15 MG tablet    MOBIC    60 tablet    Take 1 tablet (15 mg) by mouth daily    Primary osteoarthritis of left knee       MULTI vitamin  MENS Tabs      1 tablet daily        omeprazole 40 MG capsule    priLOSEC    90 capsule    Take 1 capsule (40 mg) by mouth daily Take 30-60 minutes before a meal.    Reyes's esophagus without dysplasia       ondansetron 4 MG ODT tab    ZOFRAN ODT    30 tablet    Take 1-2 tablets (4-8 mg) by mouth every 6 hours as needed for nausea    Stomach flu       pravastatin 20 MG tablet    PRAVACHOL    90 tablet    TAKE ONE TABLET BY MOUTH ONCE DAILY    Hyperlipidemia LDL goal <130       sucralfate 1 GM tablet    CARAFATE    40 tablet    Take 1  tablet (1 g) by mouth 2 times daily as needed    Gastroesophageal reflux disease with esophagitis, Arthralgia, unspecified joint, Reyes's esophagus without dysplasia       * SUMAtriptan 100 MG tablet    IMITREX    9 tablet    Take 1 tablet (100 mg) by mouth at onset of headache for migraine May repeat in 2 hours if headache not gone. Max dose is 200 mg in 24 hours    Migraine without status migrainosus, not intractable, unspecified migraine type       * SUMAtriptan Succinate Refill 6 MG/0.5ML Soct    IMITREX    2 mL    INJECT 0.5ML FOR ONE DOSE. MAY REPEAT IN 1 HR, DO NOT EXCEED 2 SHOTS IN 24 HOURS    Migraine headache       VITAMIN D3 PO      Take 1,000 Units by mouth daily        * Notice:  This list has 2 medication(s) that are the same as other medications prescribed for you. Read the directions carefully, and ask your doctor or other care provider to review them with you.

## 2018-02-07 NOTE — PROGRESS NOTES
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  He enjoys 4 wheeling jeep driving shooting guns and has a vending trailer doing [place].    He is being seen for   Chief Complaints and History of Present Illnesses   Patient presents with     Consult     left knee pain          History of Present Illness:   Mechanism of Injury: No specific injury recently.  Patient did have his knee lock up on 12/23/2017.  He was able to move it and it got better.  He did go into the emergency department on that day.  Location: Left medial anterior portion of the knee  Duration of Pain: On and off for approximately 2 months  Rating of Pain: Mild to moderate  Pain Quality: Achy but can be sharp at times.  Pain is better with: Rest  Treatment so far consists of: Rest or ice and heat, chiropractic helps somewhat as well as ibuprofen.  Patient did have a cortisone injection done by Dr. Sands on 12/11/2017 and that worked for about 1-1-1/2 days.  Patient has not had formal physical therapy and has not had home exercise program and has not had gel injections as of yet.  Pain is worse with: Activity and stairs and ladders.  Associated Features: None.  Patient denies numbness or tingling.  Prior history of related problems: Patient had his left knee drained before.  He has never had major trauma or surgery on his left knee.  Patient's right knee had a torn meniscus in 2013 and he had meniscectomy surgery done by Dr. Sands and he got better.  He does not feel his left knee feels the same as his right knee did.  Pain is Limiting: Activity although he is working through the pain.  Here to: Orthopedic consultation, second opinion  The Pain Has: Been about the same  Additional History: 1 patient was seen in the emergency department on 12/23/2017 he did get an ultrasound that was negative for DVT.  He also had x-rays that show some arthritis.   Patient just wants to continue working.    Patient's past medical, surgical, social and family histories reviewed.     Past Medical History:   Diagnosis Date     Arthritis      Reyes's esophagus with esophagitis 2008     Esophageal reflux 11/27/2006     Gastro-oesophageal reflux disease      Migraine      Morbid obesity (H) 1/22/2009     Obesity, unspecified      Pure hypercholesterolemia      SPRAIN SHOULDER/ARM NOS 12/28/2005     Tear of medial cartilage or meniscus of Rt knee, current 10/17/2013     Unspecified essential hypertension          Past Surgical History:   Procedure Laterality Date     COLONOSCOPY  4/8/2013    Procedure: COLONOSCOPY;  Colonoscopy, Esophagogastroduodenoscopy with Single Biopsy;  Surgeon: Eddie Marshall MD;  Location: [facility]     ESOPHAGOSCOPY, GASTROSCOPY, DUODENOSCOPY (EGD), COMBINED  4/8/2013    Procedure: COMBINED ESOPHAGOSCOPY, GASTROSCOPY, DUODENOSCOPY (EGD), BIOPSY SINGLE OR MULTIPLE;;  Surgeon: Eddie Marshall MD;  Location: [facility]     ESOPHAGOSCOPY, GASTROSCOPY, DUODENOSCOPY (EGD), COMBINED N/A 1/25/2017    Procedure: COMBINED ESOPHAGOSCOPY, GASTROSCOPY, DUODENOSCOPY (EGD), BIOPSY SINGLE OR MULTIPLE;  Surgeon: Kodi Ng MD;  Location: [facility]     [facility]GI ENDOSCOPY DIAG W BIOPSY  12/5/2008, 03/12/10     OPEN REDUCTION INTERNAL FIXATION WRIST Right 2/2016     REMOVE NAIL BED/FINGER TIP Left 5/2016     ROTATOR CUFF REPAIR RT/LT Right 3/2016       Medications:    Current Outpatient Prescriptions on File Prior to Visit:  ibuprofen (ADVIL/MOTRIN) 600 MG tablet Take 1 tablet (600 mg) by mouth every 6 hours as needed for moderate pain   SUMAtriptan Succinate Refill (IMITREX) 6 MG/0.5ML SOCT INJECT 0.5ML FOR ONE DOSE. MAY REPEAT IN 1 HR, DO NOT EXCEED 2 SHOTS IN 24 HOURS   pravastatin (PRAVACHOL) 20 MG tablet TAKE ONE TABLET BY MOUTH ONCE DAILY   omeprazole (PRILOSEC) 40 MG capsule Take 1 capsule (40 mg) by mouth daily Take 30-60 minutes before a meal.  "  SUMAtriptan (IMITREX) 100 MG tablet Take 1 tablet (100 mg) by mouth at onset of headache for migraine May repeat in 2 hours if headache not gone. Max dose is 200 mg in 24 hours   lisinopril (PRINIVIL/ZESTRIL) 10 MG tablet Take 1 tablet (10 mg) by mouth daily   sucralfate (CARAFATE) 1 GM tablet Take 1 tablet (1 g) by mouth 2 times daily as needed   ondansetron (ZOFRAN ODT) 4 MG ODT tab Take 1-2 tablets (4-8 mg) by mouth every 6 hours as needed for nausea   aspirin 81 MG tablet ONE DAILY   cetirizine (ZYRTEC) 10 MG tablet Take 1 tablet (10 mg) by mouth daily   Cholecalciferol (VITAMIN D3 PO) Take 1,000 Units by mouth daily   MULTI VITAMIN MENS OR TABS 1 tablet daily     No current facility-administered medications on file prior to visit.     Allergies   Allergen Reactions     No Known Drug Allergies        Social History     Occupational History     [employer].     Social History Main Topics     Smoking status: Never Smoker     Smokeless tobacco: Never Used     Alcohol use 0.0 oz/week     0 Standard drinks or equivalent per week      Comment: rare     Drug use: No     Sexual activity: Yes     Partners: Female       Family History   Problem Relation Age of Onset     HEART DISEASE Father      alcohol dependant,  at age 52     Lipids Father      CEREBROVASCULAR DISEASE Brother      had an aneurysm     Arthritis Sister      rheumatiod arthritis     Hypertension Father        REVIEW OF SYSTEMS  10 point review systems performed otherwise negative as noted as per history of present illness.    Physical Exam:  Vitals: Temp 97.9  F (36.6  C) (Temporal)  Ht 1.854 m (6' 1\")  Wt 130.6 kg (288 lb)  BMI 38 kg/m2  BMI= Body mass index is 38 kg/(m^2).    Constitutional: healthy, alert and no acute distress   Psychiatric: mentation appears normal and affect normal/bright  NEURO: no focal deficits, CMS intact left lower extremity  RESP: Normal with easy respirations and no use of accessory " muscles to breathe, no audible wheezing or retractions  CV: Calf soft and nontender to palpation.  Leg is warm.  SKIN: No erythema, rashes, excoriation, or breakdown. No evidence of infection.   MUSCULOSKELETAL:    INSPECTION of left knee: No gross deformities, erythema, edema, ecchymosis, atrophy or fasciculations.     PALPATION: No tenderness on palpation of the medial, lateral, anterior and posterior portion of the knee. No specific joint line tenderness. No increased warmth.  No effusion    ROM: Extension full, flexion to approximately 125 . All range of motion without catching, locking or pain.     STRENGTH: 5 out of 5 quad and hamstring strength.     SPECIAL TEST: Patient has a negative Lachman's negative drawer sign. Patient's knee is stable to varus and valgus stress at 30  of flexion. Patient has a negative Nichole's.  Patient could even do the Thessaly test without major pain.  GAIT: non-antalgic  Lymph: no palpable lymph nodes    Diagnostic Modalities:  Recent Results (from the past 744 hour(s))   XR Knee Left 3 Views    Narrative    KNEE LEFT THREE VIEWS    2/7/2018 3:53 PM     HISTORY: Left knee pain    COMPARISON: Right knee was provided for comparison on the AP and  sunrise views.    FINDINGS: Moderate to severe medial compartment space loss of the left  knee is noted. This is also seen in the contralateral right knee to a  greater extent. The lateral and patellofemoral compartment joint  spaces are grossly maintained. No fracture or malalignment. There is a  small left knee joint effusion.      Impression    IMPRESSION:  1. Moderate to severe medial compartment degenerative joint disease of  bilateral knees.  2. Small left knee joint effusion.  3. No fracture.    TWYLA LEAL MD     I agree with the above reading.  Independent visualization of the images was performed.    Impression: 1.  Left knee degenerative joint disease    Plan:  All of the above pertinent physical exam and imaging modalities  findings was reviewed with Ruddy and his wife.                                          INJECTION PROCEDURE:  The patient was counseled about an  injection, including discussion of risks (including infection), contents of the injection, rationale for performing the injection, and expected benefits of the injection. The skin was prepped with alcohol and betadine and then utilizing sterile technique an injection of the left knee joint from the anterolateral approach in the seated position was performed. I used teresita chloride spray prior to doing the injection. The injection consisted 1ml of Kenalog (40mg per 1ml) with 8ml 1% lidocaine plain. The patient tolerated the injection well, and there were no complications. The injection site was covered with a Band-Aid. The injection was performed by Dedrick Yanez PA-C      Patient Instructions:  1. Xrays show us degenerative joint disease of the inside part of the knee and under the knee cap also.  2. We had to try and figure out if this is the arthritis or the meniscus and after careful examination we feel its the arthritis.  3. We decided to do a cortisone injection today.  This is like putting very powerful ibuprofen right to the area it is needed.  We have information about the injection below.   4. We had you look at some braces today also.  5. I ordered Mobic 15mg once a day times 2 weeks, then take as needed.  Stop this medication if you have any abdominal pain with it.  I sent this to your pharmacy.      6.  We have information about arthritis and alternative methods as well as exercises below that you can try.  In the future you could also try formal physical therapy if you wanted.  7.You can followup with Rachel Ayoub MD in 6 weeks, if you are having pain at that time we can do a cortisone injection.  You can always cancel the appointment if you are doing really well and follow-up as needed.   Re-x-ray on return: No    Scribed by Lisandro Yanez PA-C on 2/7/2018  at 4:51 PM, based on Dr. Rachel Ayoub's statements to me.    This note was dictated with [employer].    JOSEPH Alston MD

## 2018-02-07 NOTE — PROGRESS NOTES
Ruddy Browning is a 57 year old male who is seen in consultation at the request of ED.  History of Present illness:  Ruddy presents for evaluation of:  1.) left knee  2.)   Onset: mid, December and 2017  Symptoms brought on by sitting to long.

## 2018-02-07 NOTE — PATIENT INSTRUCTIONS
Encounter Diagnosis   Name Primary?     Primary osteoarthritis of left knee Yes     Rest, ice and elevate above heart level as needed for pain control  1. Xrays show us degenerative joint disease of the inside part of the knee and under the knee cap also.  2. We had to try and figure out if this is the arthritis or the meniscus and after careful examination we feel its the arthritis.  3. We decided to do a cortisone injection today.  This is like putting very powerful ibuprofen right to the area it is needed.  We have information about the injection below.   4. We had you look at some braces today also.  5. I ordered Mobic 15mg once a day times 2 weeks, then take as needed.  Stop this medication if you have any abdominal pain with it.  I sent this to your pharmacy.      6.  We have information about arthritis and alternative methods as well as exercises below that you can try.  In the future you could also try formal physical therapy if you wanted.  7.You can followup with Rachel Ayoub MD in 6 weeks, if you are having pain at that time we can do a cortisone injection.  You can always cancel the appointment if you are doing really well and follow-up as needed.   Cortisone Instructions:     1. You received an injection of cortisone into your left knee today.  2. The joint(s) may be more painful for the first 1-2 days.  3. We ask you to continue to rest the joint(s) for a few more days before resuming regular activities.  4. Pain Medications you can take (as long as your primary care provider allows these meds and you do not have kidney or liver conditions):  Tylenol  Take 1000 mg by mouth every 6 hours as needed; maximum dose 4000 mg a day  Ibuprofen  600 mg every 6 hours as needed; maximum 2400 mg a day  (OK to take tylenol and ibuprofen at the same time)  5. Rest, ice and elevate as needed for pain control  6. Watch for these signs of infection: redness, swelling, drainage, warmth to touch, increased pain, or  fever. Call the clinic or make an appointment to be seen if you think you have an infection.  7. If you are diabetic, make sure you keep a close eye on your blood sugars, they can get elevated with cortisone injections.   8. Sometimes it can take 1-2 weeks for it to reach its full effect.    Cortisone Injections  Cortisone is a type of steroid. It can greatly reduce swelling, redness, and irritation (inflammation) and pain. Being injected with cortisone is simple and doesn t take long. Your doctor may ask you questions about your health. Certain health conditions, such as diabetes, can be affected by cortisone.     Your pain may be relieved by a cortisone injection.   Why have a cortisone injection?  Injecting cortisone can relieve pain for anything from a sports injury to arthritis. Your doctor may suggest an injection if rest, splints, or oral medicine doesn t relieve your pain. Injecting cortisone is simpler than having surgery. And cortisone may provide the lasting pain relief that can help you get out and enjoy life again.  Getting the injection  Your doctor will start by cleaning and occasionally numbing your skin at the injection site. Next you ll be injected with local anesthetics (for short-term pain relief) and cortisone. The injection may last a few moments. A small bandage will be put over the injection site. You ll then be ready to go home.  After your injection  After being injected, make sure you don t injure the treated region. But stay active. Enjoy a walk or some other mild activity. Just be careful not to strain the region that gave you trouble.  The next day  Some people feel more pain after being injected. This is normal, and it will go away soon. Applying ice for 20 minutes at a time to your injury may reduce the increased pain. Rest for the first day or two. You don t need to stay in bed. But avoid tasks that may strain the injured region.  If you have diabetes  Cortisone injections can cause  blood sugar to be increased for several days after the injection. If you have diabetes, you should follow your blood  sugar closely during this time. Follow your regular plan for what to do when your blood sugar is elevated.     2200-5090 [employer]. [address] [place] [address], [place] [address]. All rights reserved. This information is not intended as a substitute for professional medical care. Always follow your healthcare professional's instructions.       Understanding Osteoarthritis of the Knee    A joint is a place where two bones meet. The knee is called a hinge joint. This joint is formed where the thighbone (femur) meets the shinbone (tibia). A healthy knee joint bends freely. Knee osteoarthritis is a condition where parts of the knee joint wear out. This can lead to pain, stiffness, and limited movement.   What is osteoarthritis?  Every joint contains a smooth tissue called cartilage. Cartilage cushions the ends of bones and helps bones in a joint glide smoothly against each other. Knee osteoarthritis occurs when cartilage in the knee joint begins to break down and wear away. Bones may become exposed and rub together. The cartilage may become irritated and rough. This prevents smooth movement of the joint and can lead to pain.  Causes of osteoarthritis of the knee  Causes can include:    Wear and tear from normal use over time    Overuse of the knee during sports or work activities    Being overweight. This increases stress on the knee joint.    Misalignment of the knee joint    Injury to the knee  Symptoms of osteoarthritis of the knee  Common symptoms include:    Pain and swelling around the joint. The pain and swelling get worse with activity and better with rest.    Grinding sound when moving the knee    Reduced knee movement    Knee stiffness. This is often worse first thing in the morning.  Treating osteoarthritis of the knee  Osteoarthritis is a long-term condition. Treatment usually  focuses on managing symptoms. Treatment may include:    Over-the-counter or prescription medicines taken by mouth to help relieve pain and swelling    Injections of medicine into the joint to help relieve symptoms for a time    A weight-loss plan for people who are overweight    A plan of physical therapy and exercises to improve the strength and flexibility of the muscles around the knee    Heat or cold therapy to help relieve pain and stiffness    Assistive devices that help with movement, such as a cane or a walker    Assistive devices that make activities of daily life easier, such as raised toilet seats or shower bars  If other treatments don t do enough to relieve symptoms, you may need surgery to replace the joint. During this surgery, the damaged joint is removed. An artificial knee joint is then put into place. This can help relieve pain and stiffness and restore movement of the knee.      When to call your healthcare provider  Call your healthcare provider right away if you have any of these:    Fever of 100.4 F (38 C) or higher, or as directed    Symptoms that don t get better with prescribed medicines or get worse    New symptoms   Date Last Reviewed: 3/10/2016    7355-5795 [employer]. [address] [place] [address]. All rights reserved. This information is not intended as a substitute for professional medical care. Always follow your healthcare professional's instructions.      Osteoarthritis: Natural and Alternative Treatments  The treatment for osteoarthritis includes lifestyle changes like weight loss and exercise. Medications and surgery may also be part of the treatment. There are also many natural and alternative treatments. These treatments may also help relieve pain and stiffness caused by osteoarthritis.  Heat and cold  Using heat and cold treatments are simple ways to lessen arthritis symptoms.    Heat soothes stiff joints and tired muscles. Heat works well before  exercise, for example. Heat treatments include:    A warm shower or baths, or soak (for example, fill the sink with warm water and move your fingers, hands, and wrists around in the water)    A moist heating pad    A warm, moist wash cloth    An electric blanket or throw    Cold treatments help to numb painful areas and decrease swelling. Cold treatments include the followinig wrapped in a thin towel:    An ice pack or bag of ice    A gel-filled cold pack    A bag of frozen vegetables, like peas or corn  Be careful when using heat or cold. You can injure your skin. Each treatment should only last for 10 to 20 minutes. Your health care provider or therapist can give you specific instructions.    Meditation and relaxation  Meditation and relaxation can help you deal with arthritis pain. There are many different methods available including deep breathing exercises, meditation, and yoga. Look for information and programs on the Internet or in your community. Or try this simple deep breathing technique sometimes called belly breathin. Sit in a comfortable chair or lie on your back.   2. Put one hand on your chest and the other hand on your abdomen.  3. Take a breath in through your nose. The hand on your stomach should rise. The hand on your chest should move very little.  4. Breathe out through your mouth, pushing out as much air as you can. The hand on your stomach should move in as you breathe out, but the hand on your chest should move very little.You should feel the muscles of your abdomen tighten.   5. Continue to breathe in through your nose and out through your mouth. You should feel your abdomen rise and fall. Count slowly each time you breathe out.  Acupuncture  Acupuncture is a -year old practice. Practitioners insert thin needles in specific parts of the body. Research shows that it can help to relieve the pain of arthritis.   For more information or to find a practitioner in your area, contact the  American Academy of Medical Acupuncture. Their website is: http://www.medicalacupuncture.org/.  Massage    Therapeutic massage has many benefits. It may:    Help you and your muscles relax    Improve blood flow to muscles and joints    Help joints stay more flexible.  Look for a certified massage therapist. Many are trained to treat sore muscles and joint pain and stiffness.  Vitamins, supplements, and herbs  People with arthritis, or other long-term conditions that cause pain, often look for alternative ways to lessen pain. Vitamins, supplements, and herbs may or may not help you to feel better. Before you try any vitamin, supplement, or herb, make sure you ask your health care provider or pharmacist. Although they can lessen pain and stiffness, they may:    Not actually do what they claim    Have serious side effects    Cause other medical problems to worsen    Interact with other medications     Delay your getting proven medical treatment    Cost a lot of money  Get the facts about supplements on the Arthritis Foundation website. It is: http://www.arthritis.org.  Psychological treatments  Research shows that many psychological therapies or those that deal with thinking and emotions, help people cope with arthritis pain. Therapies include: cognitive-behavioral therapy (CBT), pain coping skills training, biofeedback, stress management, and hypnosis. Ask your health care provider for more information about these therapies.  For more information about many of these methods contact the National Center for Complementary and Alternative Medicine (NCCAM). Website: http://www.nccam.nih.gov.    5025-9184 [employer]. [address] [place] [address], [place] [address]. All rights reserved. This information is not intended as a substitute for professional medical care. Always follow your healthcare professional's instructions.         Hamstring Stretch (Flexibility)    1. Sit on the floor with your right leg straight in  front of you. Bend your left leg so the sole of your foot rests against the inside of your right thigh.  2. Reach toward your ankle. Keep your knee, neck, and back straight. Feel the stretch in the back of your thigh.  3. Hold for 30 to 60 seconds. Repeat 2 times, or as instructed.  4. Switch legs and repeat.  5. Repeat this exercise 3 times per day, or as instructed.      Tip: Don t bounce while you re stretching.   Date Last Reviewed: 3/10/2016    5495-2072 [employer]. [address] [place] [address]. All rights reserved. This information is not intended as a substitute for professional medical care. Always follow your healthcare professional's instructions.       Quadriceps Stretch (Flexibility)    6. Stand up straight and hold onto a wall, sturdy chair, railing, or table with your right hand.  7. Bend your left leg at the knee behind you, and grab your ankle with your left hand. Pull your left heel toward your buttocks. Don t arch your back.  8. Hold for 30 to 60 seconds. Repeat 2 times.  9. Switch legs and repeat.  Date Last Reviewed: 5/1/2016 2000-2017 [employer]. [address] [place] [address]. All rights reserved. This information is not intended as a substitute for professional medical care. Always follow your healthcare professional's instructions.      Wall Squats for ACL Healing    After you regain muscle control, it s time to build strength. This helps you put full weight on your leg. For best results, warm up and stretch before starting. If your injury is recent, wait until swelling and pain decrease before doing this exercise:    Lean against a wall with your feet hip-width apart. Your feet should be about 18 inches from the wall.    Slowly slide down to a near-sitting position. Don t let your knees go past 90 degrees.    Hold for 10 seconds, then slide back up.    Repeat 5 times.     CAUTION: Do this exercise only if your healthcare provider says  it s OK.     6189-7344 [employer]. [address] [place] [address]. All rights reserved. This information is not intended as a substitute for professional medical care. Always follow your healthcare professional's instructions.        Leg and Knee Exercises: Leg Raise    This exercise is designed to stretch and strengthen your knee. Before beginning, read through all the instructions. While exercising, breathe normally and use smooth movements. If you feel any pain, stop the exercise. If pain persists, call your healthcare provider.  Caution    Don t arch your back.    Don t hunch your shoulders.     Sit on the floor with your_________ leg straight, the other bent.    Tighten the thigh muscles on the top of your straight leg. You should feel the muscles contract. Raise that leg 6-8 inches. Then lower it slowly and steadily to the floor. Relax.    Repeat ______ times.  Do ______ sets a day.    6877-6393 [employer]. [address] [place] [address]. All rights reserved. This information is not intended as a substitute for professional medical care. Always follow your healthcare professional's instructions.        Quad Set for Leg and Knee    This exercise is designed to stretch and strengthen your knee. Before beginning, read through all the instructions. While exercising, breathe normally and use smooth movements. If you feel any pain, stop the exercise. If pain persists, call your healthcare provider.  1.  Sit on the floor with one leg straight, the other bent.  2.  Flex the foot of your straight leg by pointing your toes toward you. Press the back of your knee into the floor while tightening the muscle on the top of your thigh. Hold for ______ seconds. Then relax.  3.  Repeat ______ times. Do ______ sets a day.  Caution    Don t arch your back.    Don t hunch your shoulders.    7193-9177 [employer]. [address] [place] [address]. All rights  reserved. This information is not intended as a substitute for professional medical care. Always follow your healthcare professional's instructions.      [employer] and [employer] may offer reliable information regarding your diagnosis and treatment plan.    THANK YOU for coming in today. If you receive a survey via [employer] or mail please let us know if there was anything you especially appreciated today or if there is any way we can improve our clinic. We appreciate your input.    GENERAL INFORMATION:  Our hours are:  Monday :     Clinic 7:30 AM-430 PM ([facility])  Tuesday:      Operating Room All Day ([facility])  Wednesday: Clinic 7:30 AM - 11:15 AM ([facility])             Clinic 1:00 PM - 4:00PM ([facility])  Thursday:     Administrative Day  Friday:          Clinic 7:30 AM - 11:15 AM ([facility])            Clinic 1:00 PM - 4:00 PM ([facility])      Bone and Joint Service Line for any issues or concerns: [phone]      We are not in the office Thursdays. Therefore non- urgent calls and medical messages received on Thursday will be addressed when we are back in the office on Wednesday. Urgent matters will be reviewed and addressed by one of our partners in the office as needed.    If lab work was done today as part of your evaluation you will generally be contacted via [employer], mail, or phone with the results within 1-5 days. If there is an alarming result we will contact you by phone. Lab results come back at varying times, I generally wait until all labs are resulted before making comments on results. Please note labs are automatically released to [employer] (if you have signed up for it) once available-at times you may see these prior to my having a chance to review them as well.    If you need refills please contact your pharmacist. They will send a refill request to me to  review. Please allow 3 business days for us to process all refill requests. All narcotic refills should be handled in the clinic at the time of your visit.

## 2018-02-07 NOTE — NURSING NOTE
"Chief Complaint   Patient presents with     Consult     left knee pain        Initial Temp 97.9  F (36.6  C) (Temporal)  Ht 1.854 m (6' 1\")  Wt 130.6 kg (288 lb)  BMI 38 kg/m2 Estimated body mass index is 38 kg/(m^2) as calculated from the following:    Height as of this encounter: 1.854 m (6' 1\").    Weight as of this encounter: 130.6 kg (288 lb).  Medication Reconciliation: complete    "

## 2018-02-09 DIAGNOSIS — I10 BENIGN ESSENTIAL HYPERTENSION: ICD-10-CM

## 2018-02-09 DIAGNOSIS — I10 ESSENTIAL HYPERTENSION, BENIGN: Primary | ICD-10-CM

## 2018-02-09 NOTE — TELEPHONE ENCOUNTER
"Requested Prescriptions   Pending Prescriptions Disp Refills     lisinopril (PRINIVIL/ZESTRIL) 10 MG tablet [Pharmacy Med Name: LISINOPRIL 10MG TABS] 90 tablet 3     Sig: TAKE ONE TABLET BY MOUTH ONCE DAILY    ACE Inhibitors (Including Combos) Protocol Failed    2/9/2018  5:36 AM       Failed - Blood pressure under 140/90    BP Readings from Last 3 Encounters:   12/23/17 (!) 137/91   02/21/17 112/78   01/25/17 (!) 86/55                Failed - Normal serum creatinine on file in past 12 months    Recent Labs   Lab Test  02/21/17   1415   CR  2.07*            Passed - Recent or future visit with authorizing provider's specialty    Patient had office visit in the last year or has a visit in the next 30 days with authorizing provider.  See \"Patient Info\" tab in inbasket, or \"Choose Columns\" in Meds & Orders section of the refill encounter.            Passed - Patient is age 18 or older       Passed - Normal serum potassium on file in past 12 months    Recent Labs   Lab Test  02/21/17   1415   POTASSIUM  3.7               Last Written Prescription Date:  2/22/17  Last Fill Quantity: 90,  # refills: 3   Last Office Visit with [facility] or [facility] prescribing provider:  1/17/17  Future Office Visit:       "

## 2018-02-12 RX ORDER — LISINOPRIL 10 MG/1
TABLET ORAL
Qty: 90 TABLET | Refills: 0 | Status: SHIPPED | OUTPATIENT
Start: 2018-02-12 | End: 2018-04-04

## 2018-02-12 NOTE — TELEPHONE ENCOUNTER
Patient is due for labs and an office visit.  Please schedule left for him and place orders for lipid profile, CMP, and a microalbumin.    Electronically signed by:  Maikol Allen M.D.  2/12/2018

## 2018-02-12 NOTE — TELEPHONE ENCOUNTER
Routing refill request to provider for review/approval because:  Labs out of range:  Creatinine was 2.07 in Feb 2017  Patient needs to be seen because it has been more than 1 year since last office visit.  Louise Cho RN

## 2018-02-24 DIAGNOSIS — I10 BENIGN ESSENTIAL HYPERTENSION: ICD-10-CM

## 2018-02-24 DIAGNOSIS — I10 ESSENTIAL HYPERTENSION, BENIGN: ICD-10-CM

## 2018-02-24 LAB
ALBUMIN SERPL-MCNC: 3.8 G/DL (ref 3.4–5)
ALBUMIN UR-MCNC: NEGATIVE MG/DL
ALP SERPL-CCNC: 67 U/L (ref 40–150)
ALT SERPL W P-5'-P-CCNC: 35 U/L (ref 0–70)
ANION GAP SERPL CALCULATED.3IONS-SCNC: 7 MMOL/L (ref 3–14)
APPEARANCE UR: CLEAR
AST SERPL W P-5'-P-CCNC: 22 U/L (ref 0–45)
BILIRUB SERPL-MCNC: 0.7 MG/DL (ref 0.2–1.3)
BILIRUB UR QL STRIP: NEGATIVE
BUN SERPL-MCNC: 21 MG/DL (ref 7–30)
CALCIUM SERPL-MCNC: 8.9 MG/DL (ref 8.5–10.1)
CHLORIDE SERPL-SCNC: 106 MMOL/L (ref 94–109)
CHOLEST SERPL-MCNC: 175 MG/DL
CO2 SERPL-SCNC: 28 MMOL/L (ref 20–32)
COLOR UR AUTO: YELLOW
CREAT SERPL-MCNC: 0.9 MG/DL (ref 0.66–1.25)
GFR SERPL CREATININE-BSD FRML MDRD: 87 ML/MIN/1.7M2
GLUCOSE SERPL-MCNC: 112 MG/DL (ref 70–99)
GLUCOSE UR STRIP-MCNC: NEGATIVE MG/DL
HDLC SERPL-MCNC: 41 MG/DL
HGB UR QL STRIP: NEGATIVE
KETONES UR STRIP-MCNC: NEGATIVE MG/DL
LDLC SERPL CALC-MCNC: 109 MG/DL
LEUKOCYTE ESTERASE UR QL STRIP: NEGATIVE
NITRATE UR QL: NEGATIVE
NONHDLC SERPL-MCNC: 134 MG/DL
PH UR STRIP: 5 PH (ref 5–7)
POTASSIUM SERPL-SCNC: 4.3 MMOL/L (ref 3.4–5.3)
PROT SERPL-MCNC: 7.2 G/DL (ref 6.8–8.8)
SODIUM SERPL-SCNC: 141 MMOL/L (ref 133–144)
SOURCE: NORMAL
SP GR UR STRIP: 1.03 (ref 1–1.03)
TRIGL SERPL-MCNC: 125 MG/DL
UROBILINOGEN UR STRIP-MCNC: 0 MG/DL (ref 0–2)

## 2018-02-24 PROCEDURE — 81003 URINALYSIS AUTO W/O SCOPE: CPT | Performed by: FAMILY MEDICINE

## 2018-02-24 PROCEDURE — 80061 LIPID PANEL: CPT | Performed by: FAMILY MEDICINE

## 2018-02-24 PROCEDURE — 36415 COLL VENOUS BLD VENIPUNCTURE: CPT | Performed by: FAMILY MEDICINE

## 2018-02-24 PROCEDURE — 80053 COMPREHEN METABOLIC PANEL: CPT | Performed by: FAMILY MEDICINE

## 2018-02-26 ENCOUNTER — OFFICE VISIT (OUTPATIENT)
Dept: ORTHOPEDICS | Facility: CLINIC | Age: 58
End: 2018-02-26
Payer: COMMERCIAL

## 2018-02-26 VITALS — WEIGHT: 291 LBS | TEMPERATURE: 97.7 F | BODY MASS INDEX: 38.57 KG/M2 | HEIGHT: 73 IN

## 2018-02-26 DIAGNOSIS — M17.32 POST-TRAUMATIC OSTEOARTHRITIS OF LEFT KNEE: Primary | ICD-10-CM

## 2018-02-26 PROCEDURE — 20610 DRAIN/INJ JOINT/BURSA W/O US: CPT | Mod: LT | Performed by: ORTHOPAEDIC SURGERY

## 2018-02-26 PROCEDURE — 99214 OFFICE O/P EST MOD 30 MIN: CPT | Mod: 25 | Performed by: ORTHOPAEDIC SURGERY

## 2018-02-26 RX ORDER — TRAMADOL HYDROCHLORIDE 50 MG/1
50-100 TABLET ORAL EVERY 6 HOURS PRN
Qty: 40 TABLET | Refills: 0 | Status: SHIPPED | OUTPATIENT
Start: 2018-02-26 | End: 2018-03-10

## 2018-02-26 ASSESSMENT — PAIN SCALES - GENERAL: PAINLEVEL: SEVERE PAIN (7)

## 2018-02-26 NOTE — PROGRESS NOTES
"Office Visit-Follow up    Chief Complaint: Ruddy Browning is a 57 year old male who is being seen for   Chief Complaint   Patient presents with     RECHECK     f/u left knee       History of Present Illness:   Still has severe pain  Mobic and cortisone did not help at all  Has medial knee pain, tightness, limps, difficulty sleeping  No mechanical symptoms  He has a h/o ibuprofen-induced gastric ulcers so he would like to stay away from other nsaids    REVIEW OF SYSTEMS  General: negative for, night sweats, dizziness, fatigue  Resp: No shortness of breath and no cough  CV: negative for chest pain, syncope or near-syncope  GI: negative for nausea, vomiting and diarrhea  : negative for dysuria and hematuria  Musculoskeletal: as above  Neurologic: negative for syncope   Hematologic: negative for bleeding disorder    Physical Exam:  Vitals: Temp 97.7  F (36.5  C) (Temporal)  Ht 1.854 m (6' 1\")  Wt 132 kg (291 lb)  BMI 38.39 kg/m2  BMI= Body mass index is 38.39 kg/(m^2).  Constitutional: healthy, alert and no acute distress   Psychiatric: mentation appears normal and affect normal/bright  NEURO: no focal deficits  RESP: Normal with easy respirations and no use of accessory muscles to breathe, no audible wheezing or retractions  CV: No peripheral edema  SKIN: No erythema, rashes, excoriation, or breakdown. No evidence of infection.   JOINT/EXTREMITIES:left Knee Exam: Inspection: No effusion, No quad atrophy  Tender: medial joint line  Active Range of Motion: pain with flexion  Strength: normal  Special tests: normal Valgus stress test, normal Varus, negative Lachman's test    GAIT: antalgic            Diagnostic Modalities:  None today.        Impression: left Knee mod-severe osteoarthritis    Plan:  All of the above pertinent physical exam and imaging modalities findings was reviewed with Ruddy and his wife.                                          CONSERVATIVE CARE:  I recommend conservative care for the patient " to include Tramadol, focused self directed physical therapy, formal physical therapy, viscosupplementation injections, steroid injections, activity modifications. Today I provided or dispensed Tramadol prescription, physical therapy.                                        INJECTION PROCEDURE:  The patient was counseled about an  injection, including discussion of risks (including infection), contents of the injection, rationale for performing the injection, and expected benefits of the injection. The skin was prepped with alcohol and betadine and then utilizing sterile technique an injection of the left knee joint from the superior lateral approach in the supine position was performed. The injection consisted Synvisc-One (6cc). The patient tolerated the injection well, and there were no complications. The injection site was covered with a Band-Aid. The injection was performed by Rachel Ayoub M.D.                                                FUTURE PLAN:  On their return if they still have symptoms we will consider cortisone injection, MRI (claustrophobic so he would need some valium).        Return to clinic 2, week(s), or sooner as needed for changes.  Re-x-ray on return: No    Rachel Ayoub M.D.

## 2018-02-26 NOTE — LETTER
"    2/26/2018         RE: Ruddy Browning  [address] [address] [place] [id number]        Dear Colleague,    Thank you for referring your patient, Ruddy Browning, to the [facility]. Please see a copy of my visit note below.    Office Visit-Follow up    Chief Complaint: Ruddy Browning is a 57 year old male who is being seen for   Chief Complaint   Patient presents with     RECHECK     f/u left knee       History of Present Illness:   Still has severe pain  Mobic and cortisone did not help at all  Has medial knee pain, tightness, limps, difficulty sleeping  No mechanical symptoms  He has a h/o ibuprofen-induced gastric ulcers so he would like to stay away from other nsaids    REVIEW OF SYSTEMS  General: negative for, night sweats, dizziness, fatigue  Resp: No shortness of breath and no cough  CV: negative for chest pain, syncope or near-syncope  GI: negative for nausea, vomiting and diarrhea  : negative for dysuria and hematuria  Musculoskeletal: as above  Neurologic: negative for syncope   Hematologic: negative for bleeding disorder    Physical Exam:  Vitals: Temp 97.7  F (36.5  C) (Temporal)  Ht 1.854 m (6' 1\")  Wt 132 kg (291 lb)  BMI 38.39 kg/m2  BMI= Body mass index is 38.39 kg/(m^2).  Constitutional: healthy, alert and no acute distress   Psychiatric: mentation appears normal and affect normal/bright  NEURO: no focal deficits  RESP: Normal with easy respirations and no use of accessory muscles to breathe, no audible wheezing or retractions  CV: No peripheral edema  SKIN: No erythema, rashes, excoriation, or breakdown. No evidence of infection.   JOINT/EXTREMITIES:left Knee Exam: Inspection: No effusion, No quad atrophy  Tender: medial joint line  Active Range of Motion: pain with flexion  Strength: normal  Special tests: normal Valgus stress test, normal Varus, negative Lachman's test    GAIT: antalgic            Diagnostic Modalities:  None today.        Impression: left Knee " mod-severe osteoarthritis    Plan:  All of the above pertinent physical exam and imaging modalities findings was reviewed with Ruddy and his wife.                                          CONSERVATIVE CARE:  I recommend conservative care for the patient to include Tramadol, focused self directed physical therapy, formal physical therapy, viscosupplementation injections, steroid injections, activity modifications. Today I provided or dispensed Tramadol prescription, physical therapy.                                        INJECTION PROCEDURE:  The patient was counseled about an  injection, including discussion of risks (including infection), contents of the injection, rationale for performing the injection, and expected benefits of the injection. The skin was prepped with alcohol and betadine and then utilizing sterile technique an injection of the left knee joint from the superior lateral approach in the supine position was performed. The injection consisted Synvisc-One (6cc). The patient tolerated the injection well, and there were no complications. The injection site was covered with a Band-Aid. The injection was performed by Rachel Ayoub M.D.                                                FUTURE PLAN:  On their return if they still have symptoms we will consider cortisone injection, MRI (claustrophobic so he would need some valium).        Return to clinic 2, week(s), or sooner as needed for changes.  Re-x-ray on return: No    Rachel Ayoub M.D.          Again, thank you for allowing me to participate in the care of your patient.        Sincerely,        Rachel Ayoub MD

## 2018-02-26 NOTE — MR AVS SNAPSHOT
"              After Visit Summary   2/26/2018    Ruddy Browning    MRN: [id number]           Patient Information     Date Of Birth          1960        Visit Information        Provider Department      2/26/2018 4:00 PM Rachel Ayoub MD [facility]        Today's Diagnoses     Post-traumatic osteoarthritis of left knee    -  1       Follow-ups after your visit        Additional Services     PHYSICAL THERAPY REFERRAL       *This therapy referral will be filtered to a centralized scheduling office at [facility] and the patient will receive a call to schedule an appointment at a [facility] location most convenient for them. *     [facility] provides Physical Therapy evaluation and treatment and many specialty services across the [facility] system.  If requesting a specialty program, please choose from the list below.    If you have not heard from the scheduling office within 2 business days, please call [phone] for all locations, with the exception of [place], please call [phone] and [facility], please call [phone]  Treatment: Evaluation & Treatment  Special Instructions/Modalities:   Special Programs: None    Please be aware that coverage of these services is subject to the terms and limitations of your health insurance plan.  Call member services at your health plan with any benefit or coverage questions.      **Note to Provider:  If you are referring outside of [facility] for the therapy appointment, please list the name of the location in the \"special instructions\" above, print the referral and give to the patient to schedule the appointment.                  Your next 10 appointments already scheduled     Mar 12, 2018  3:30 PM CDT   Return Visit with Rachel Ayoub MD   [facility]    [address] [place] [id number] [phone]            Apr 04, 2018  3:00 PM CDT " "  Office Visit with Maikol Allen MD   [facility] ([facility])    [address] [facility] [address] [phone]           Bring a current list of meds and any records pertaining to this visit. For Physicals, please bring immunization records and any forms needing to be filled out. Please arrive 10 minutes early to complete paperwork.              Who to contact     If you have questions or need follow up information about today's clinic visit or your schedule please contact [facility] directly at [phone].  Normal or non-critical lab and imaging results will be communicated to you by [employer], letter or phone within 4 business days after the clinic has received the results. If you do not hear from us within 7 days, please contact the clinic through [employer] or phone. If you have a critical or abnormal lab result, we will notify you by phone as soon as possible.  Submit refill requests through [employer] or call your pharmacy and they will forward the refill request to us. Please allow 3 business days for your refill to be completed.          Additional Information About Your Visit        MyChart Information     [employer] lets you send messages to your doctor, view your test results, renew your prescriptions, schedule appointments and more. To sign up, go to [email or web address] . Click on \"Log in\" on the left side of the screen, which will take you to the Welcome page. Then click on \"Sign up Now\" on the right side of the page.     You will be asked to enter the access code listed below, as well as some personal information. Please follow the directions to create your username and password.     Your access code is: [email or web address]  Expires: 2018  4:49 PM     Your access code will  in 90 days. If you need help or a new code, please call your [facility] or [phone].        Care EveryWhere ID     This is your Care EveryWhere ID. This " "could be used by other organizations to access your [facility] medical records  [id number]        Your Vitals Were     Temperature Height BMI (Body Mass Index)             97.7  F (36.5  C) (Temporal) 1.854 m (6' 1\") 38.39 kg/m2          Blood Pressure from Last 3 Encounters:   12/23/17 (!) 137/91   02/21/17 112/78   01/25/17 (!) 86/55    Weight from Last 3 Encounters:   02/26/18 132 kg (291 lb)   02/07/18 130.6 kg (288 lb)   12/23/17 131.5 kg (290 lb)              We Performed the Following     C Synvisc per 1 MG  []     Large Joint/Bursa injection and/or drainage (Shoulder, Knee)     PHYSICAL THERAPY REFERRAL          Today's Medication Changes          These changes are accurate as of 2/26/18  5:03 PM.  If you have any questions, ask your nurse or doctor.               Start taking these medicines.        Dose/Directions    Hylan 48 MG/6ML Sosy injection   Commonly known as:  SYNVISC ONE   Used for:  Post-traumatic osteoarthritis of left knee   Started by:  Rachel Ayoub MD        Dose:  16 mg   16 mg by INTRA-ARTICULAR route once for 1 dose   Quantity:  2 mL   Refills:  0       traMADol 50 MG tablet   Commonly known as:  ULTRAM   Used for:  Post-traumatic osteoarthritis of left knee   Started by:  Rachel Ayoub MD        Dose:   mg   Take 1-2 tablets ( mg) by mouth every 6 hours as needed for pain   Quantity:  40 tablet   Refills:  0            Where to get your medicines      Some of these will need a paper prescription and others can be bought over the counter.  Ask your nurse if you have questions.     Bring a paper prescription for each of these medications     traMADol 50 MG tablet       You don't need a prescription for these medications     Hylan 48 MG/6ML Sosy injection                Primary Care Provider Office Phone # Fax #    Maikol Allen [phone]       [address] [place] [address]        Equal Access to Services     KAYLYN CASTRO AH: " Hadii liset argueta hadaracelio Soomaali, waaxda luqadaha, qaybta kaalmada raymond, dara romero. So [facility] [phone].    ATENCIÓN: Si arianna guthrie, tiene a ross disposición servicios gratuitos de asistencia lingüística. Llame al [phone].    We comply with applicable federal civil rights laws and [place] laws. We do not discriminate on the basis of race, color, national origin, age, disability, sex, sexual orientation, or gender identity.            Thank you!     Thank you for choosing [facility]  for your care. Our goal is always to provide you with excellent care. Hearing back from our patients is one way we can continue to improve our services. Please take a few minutes to complete the written survey that you may receive in the mail after your visit with us. Thank you!             Your Updated Medication List - Protect others around you: Learn how to safely use, store and throw away your medicines at www.disposemymeds.org.          This list is accurate as of 2/26/18  5:03 PM.  Always use your most recent med list.                   Brand Name Dispense Instructions for use Diagnosis    aspirin 81 MG tablet      ONE DAILY    Essential hypertension, benign, Mixed hyperlipidemia       cetirizine 10 MG tablet    zyrTEC    90 tablet    Take 1 tablet (10 mg) by mouth daily    Seasonal allergies       Hylan 48 MG/6ML Sosy injection    SYNVISC ONE    2 mL    16 mg by INTRA-ARTICULAR route once for 1 dose    Post-traumatic osteoarthritis of left knee       ibuprofen 600 MG tablet    ADVIL/MOTRIN    60 tablet    Take 1 tablet (600 mg) by mouth every 6 hours as needed for moderate pain    Left knee pain, unspecified chronicity       lisinopril 10 MG tablet    PRINIVIL/ZESTRIL    90 tablet    TAKE ONE TABLET BY MOUTH ONCE DAILY    Benign essential hypertension, Essential hypertension, benign       meloxicam 15 MG tablet    MOBIC    60 tablet    Take 1 tablet (15 mg) by mouth daily     Primary osteoarthritis of left knee       MULTI vitamin  MENS Tabs      1 tablet daily        omeprazole 40 MG capsule    priLOSEC    90 capsule    Take 1 capsule (40 mg) by mouth daily Take 30-60 minutes before a meal.    Reyes's esophagus without dysplasia       ondansetron 4 MG ODT tab    ZOFRAN ODT    30 tablet    Take 1-2 tablets (4-8 mg) by mouth every 6 hours as needed for nausea    Stomach flu       pravastatin 20 MG tablet    PRAVACHOL    90 tablet    TAKE ONE TABLET BY MOUTH ONCE DAILY    Hyperlipidemia LDL goal <130       sucralfate 1 GM tablet    CARAFATE    40 tablet    Take 1 tablet (1 g) by mouth 2 times daily as needed    Gastroesophageal reflux disease with esophagitis, Arthralgia, unspecified joint, Reyes's esophagus without dysplasia       * SUMAtriptan 100 MG tablet    IMITREX    9 tablet    Take 1 tablet (100 mg) by mouth at onset of headache for migraine May repeat in 2 hours if headache not gone. Max dose is 200 mg in 24 hours    Migraine without status migrainosus, not intractable, unspecified migraine type       * SUMAtriptan Succinate Refill 6 MG/0.5ML Soct    IMITREX    2 mL    INJECT 0.5ML FOR ONE DOSE. MAY REPEAT IN 1 HR, DO NOT EXCEED 2 SHOTS IN 24 HOURS    Migraine headache       traMADol 50 MG tablet    ULTRAM    40 tablet    Take 1-2 tablets ( mg) by mouth every 6 hours as needed for pain    Post-traumatic osteoarthritis of left knee       VITAMIN D3 PO      Take 1,000 Units by mouth daily        * Notice:  This list has 2 medication(s) that are the same as other medications prescribed for you. Read the directions carefully, and ask your doctor or other care provider to review them with you.

## 2018-03-01 ENCOUNTER — HOSPITAL ENCOUNTER (OUTPATIENT)
Dept: PHYSICAL THERAPY | Facility: CLINIC | Age: 58
Setting detail: THERAPIES SERIES
End: 2018-03-01
Attending: ORTHOPAEDIC SURGERY
Payer: COMMERCIAL

## 2018-03-01 PROCEDURE — 40000718 ZZHC STATISTIC PT DEPARTMENT ORTHO VISIT

## 2018-03-01 PROCEDURE — 97161 PT EVAL LOW COMPLEX 20 MIN: CPT | Mod: GP

## 2018-03-01 PROCEDURE — 97110 THERAPEUTIC EXERCISES: CPT | Mod: GP

## 2018-03-01 NOTE — PROGRESS NOTES
03/01/18 1616   General Information   Type of Visit Initial OP Ortho PT Evaluation   Start of Care Date 03/01/18   Referring Physician Dr. Rachel Ayoub   Orders Evaluate and Treat   Date of Order 02/26/18   Insurance Type [employer]   Insurance Comments/Visits Authorized 20   Medical Diagnosis osteoarthrits of left knee   Surgical/Medical history reviewed Yes   Precautions/Limitations no known precautions/limitations   Weight-Bearing Status - LUE full weight-bearing   Weight-Bearing Status - RUE full weight-bearing   Weight-Bearing Status - LLE full weight-bearing   Weight-Bearing Status - RLE full weight-bearing   Body Part(s)   Body Part(s) Knee   Presentation and Etiology   Pertinent history of current problem (include personal factors and/or comorbidities that impact the POC) knee degeneration bilateral but left is more now.   Impairments A. Pain;E. Decreased flexibility;F. Decreased strength and endurance;H. Impaired gait;J. Burning   Functional Limitations perform activities of daily living;perform required work activities;perform desired leisure / sports activities   Symptom Location left knee   How/Where did it occur From Degenerative Joint Disease   Onset date of current episode/exacerbation 12/11/17   Chronicity Chronic   Pain rating (0-10 point scale) Best (/10);Worst (/10)   Best (/10) best 1/10, 5/10 average   Worst (/10) 10/10   Pain quality A. Sharp;F. Stabbing;D. Burning   Frequency of pain/symptoms A. Constant   Pain/symptoms are: The same all the time   Pain/symptoms exacerbated by L. Work tasks;I. Bending;M. Other   Pain exacerbation comment stairs descending   Pain/symptoms eased by H. Cold;I. OTC medication(s)   Progression of symptoms since onset: Improved   Current / Previous Interventions   Diagnostic Tests: X-ray   X-ray Results Results   X-ray results Moderate to severe medial compartment degenerative joint disease of   Prior Level of Function   Prior Level of Function-Mobility no  issues   Prior Level of Function-ADLs no issues   Current Level of Function   Patient role/employment history A. Employed   Employment Comments construction, stairs and ladders   [facility]   Fall Risk Screen   Fall screen completed by PT   Have you fallen 2 or more times in the past year? No   Have you fallen and had an injury in the past year? No   Functional Scales   Functional Scales Other   Other Scales  LEFS 57   Knee Objective Findings   Side (if bilateral, select both right and left) Left   Observation tall gentleman. excess anterior weight.   Posture mild bow legs otherwise correct standing posture and sitting posture   Gait/Locomotion excess weight shift onto left to create a limp.   Balance/Proprioception (Single Leg Stance) unable   Foot Position In Standing feet out to side mildly   Knee/Hip Strength Comments he has been favoring left side so weakness and gait deviation present   Anterior Drawer Test neg   Posterior Drawer Test neg   Varus Stress Test neg   Valgus Stress Test neg   Palpation mild reactivity on medial knee next to patella    Left Knee Extension AROM 0   Left Knee Flexion AROM 90   Left Knee Flexion Strength 4   Left Knee Extension Strength 4   Left Hip Abduction Strength 3   Left Quad Set Strength 4   L VMO Strength 3   Left Hamstring Flexibility moderately limited   Left Hip Flexor Flexibility normal   Planned Therapy Interventions   Planned Therapy Interventions manual therapy;neuromuscular re-education;strengthening;ROM   Planned Modality Interventions   Planned Modality Interventions Ultrasound;Electrical stimulation   Planned Modality Interventions Comments as needed   Clinical Impression   Criteria for Skilled Therapeutic Interventions Met yes, treatment indicated   PT Diagnosis gait dyfunction , weakness left knee and decreased ROM   Functional limitations due to impairments sleeping pain, stair climbing pain.   Clinical Presentation Stable/Uncomplicated    Clinical Decision Making (Complexity) Low complexity   Therapy Frequency 1 time/week   Predicted Duration of Therapy Intervention (days/wks) 60 days   Risk & Benefits of therapy have been explained Yes   Patient, Family & other staff in agreement with plan of care Yes   Education Assessment   Preferred Learning Style Listening;Demonstration;Pictures/video   Barriers to Learning No barriers   ORTHO GOALS   PT Ortho Eval Goals 2   Ortho Goal 1   Goal Identifier 1   Goal Description Pt will report 75 to 100% decrease in pain on left knee and have correct gait pattern for efficient ambulation to preserve pain managment.   Target Date 04/29/18   Ortho Goal 2   Goal Identifier 2   Goal Description Pt will have 5/5 VMO strength plus 5/5 left hip and knee strength to allow him to walk wiht correct gait pattern and to climb stairs without deviations.   Target Date 04/29/18   Total Evaluation Time   Total Evaluation Time 30

## 2018-03-05 ENCOUNTER — TELEPHONE (OUTPATIENT)
Dept: ORTHOPEDICS | Facility: CLINIC | Age: 58
End: 2018-03-05

## 2018-03-05 DIAGNOSIS — M17.32 POST-TRAUMATIC OSTEOARTHRITIS OF LEFT KNEE: Primary | ICD-10-CM

## 2018-03-05 DIAGNOSIS — M17.2 POST-TRAUMATIC OSTEOARTHRITIS OF BOTH KNEES: Primary | ICD-10-CM

## 2018-03-05 RX ORDER — DIAZEPAM 2 MG
2 TABLET ORAL PRN
Qty: 4 TABLET | Refills: 0 | Status: SHIPPED | OUTPATIENT
Start: 2018-03-05 | End: 2018-03-12

## 2018-03-05 NOTE — TELEPHONE ENCOUNTER
Pt's wife notified that orders are in and he can make his MRI appointment and then make a f/u appointment with Dr. Ayoub to go over the results.

## 2018-03-05 NOTE — TELEPHONE ENCOUNTER
Reason for Call: Request for an order or referral:    Order or referral being requested: Left knee MRI- the injection didn't work that he had last week and they are requesting MRI to be ordered asap due to the pain he is having.     Date needed: as soon as possible    Has the patient been seen by the PCP for this problem? YES    Additional comments: none    Phone number Patient can be reached at:  Home number on file [phone] (home)     Best Time:  any    Can we leave a detailed message on this number?  YES    Call taken on 3/5/2018 at 7:59 AM by Isabel Dumont

## 2018-03-06 ENCOUNTER — HOSPITAL ENCOUNTER (OUTPATIENT)
Dept: MRI IMAGING | Facility: CLINIC | Age: 58
Discharge: HOME | End: 2018-03-06
Attending: ORTHOPAEDIC SURGERY | Admitting: ORTHOPAEDIC SURGERY
Payer: COMMERCIAL

## 2018-03-06 ENCOUNTER — HOSPITAL ENCOUNTER (OUTPATIENT)
Dept: PHYSICAL THERAPY | Facility: CLINIC | Age: 58
Setting detail: THERAPIES SERIES
End: 2018-03-06
Attending: ORTHOPAEDIC SURGERY
Payer: COMMERCIAL

## 2018-03-06 DIAGNOSIS — M17.32 POST-TRAUMATIC OSTEOARTHRITIS OF LEFT KNEE: ICD-10-CM

## 2018-03-06 PROCEDURE — 97140 MANUAL THERAPY 1/> REGIONS: CPT | Mod: GP

## 2018-03-06 PROCEDURE — 40000718 ZZHC STATISTIC PT DEPARTMENT ORTHO VISIT

## 2018-03-06 PROCEDURE — 97035 APP MDLTY 1+ULTRASOUND EA 15: CPT | Mod: GP

## 2018-03-06 PROCEDURE — 73721 MRI JNT OF LWR EXTRE W/O DYE: CPT | Mod: LT

## 2018-03-10 ENCOUNTER — HOSPITAL ENCOUNTER (EMERGENCY)
Facility: CLINIC | Age: 58
Discharge: HOME | End: 2018-03-10
Attending: FAMILY MEDICINE | Admitting: FAMILY MEDICINE
Payer: COMMERCIAL

## 2018-03-10 DIAGNOSIS — M25.562 ACUTE PAIN OF LEFT KNEE: ICD-10-CM

## 2018-03-10 DIAGNOSIS — F43.23 ADJUSTMENT DISORDER WITH MIXED ANXIETY AND DEPRESSED MOOD: ICD-10-CM

## 2018-03-10 PROCEDURE — 25000132 ZZH RX MED GY IP 250 OP 250 PS 637: Performed by: FAMILY MEDICINE

## 2018-03-10 PROCEDURE — 99283 EMERGENCY DEPT VISIT LOW MDM: CPT | Performed by: FAMILY MEDICINE

## 2018-03-10 PROCEDURE — 99284 EMERGENCY DEPT VISIT MOD MDM: CPT | Mod: Z6 | Performed by: FAMILY MEDICINE

## 2018-03-10 RX ORDER — LORAZEPAM 1 MG/1
1 TABLET ORAL EVERY 8 HOURS PRN
Qty: 10 TABLET | Refills: 0 | Status: SHIPPED | OUTPATIENT
Start: 2018-03-10 | End: 2018-03-12

## 2018-03-10 RX ORDER — LORAZEPAM 1 MG/1
1 TABLET ORAL ONCE
Status: COMPLETED | OUTPATIENT
Start: 2018-03-10 | End: 2018-03-10

## 2018-03-10 RX ADMIN — LORAZEPAM 1 MG: 1 TABLET ORAL at 11:25

## 2018-03-10 ASSESSMENT — ENCOUNTER SYMPTOMS
NERVOUS/ANXIOUS: 1
ACTIVITY CHANGE: 0
ARTHRALGIAS: 1
APPETITE CHANGE: 0

## 2018-03-10 NOTE — ED NOTES
"Here with a four day history of anxiety. States it started after his MRI on Wed. He reports feeling claustrophobic and has not felt right since that time. He stopped and bought some over the counter Stress relief tablets and used a few this AM. \"I felt better for a few hours and then it started again\"   He also has been on Tramadol for the last few weeks for knee pain and states anxiety is a side affect.  "

## 2018-03-10 NOTE — ED PROVIDER NOTES
"  History     Chief Complaint   Patient presents with     Anxiety     The history is provided by the patient and the spouse.     Ruddy Browning is a 57 year old male who presents to the ED complaining of anxiety. The patient says that he has never had a history of anxiety before this. His symptoms began when he had a MRI for his knee on 3/6/2018. Patient reports that he is somewhat claustrophobic and has been extremely anxious since then. His wife of 40 years says that she has \"never seen him this anxious\". Patient says that he used to be a  and was not claustrophobic then, so he is unsure why he is reacting this way now. Patient states that his anxiety is so severe that he has been experiencing thoughts of self harm and suicide for the last two days because he \"knows this would end his anxiety\". He says that despite these thoughts, he \"would never do it, because he is too smart\". Patient admits that he does carry a pistol. This morning, the patient stropped and purchased some OTC Stress Relief tablets which helped control his symptoms for a few hours, but then his anxiety reoccurred so he decided to come to the ED. He mentions that a few weeks ago, he was started on Tramadol for his knee. His brother has had anxious reactions to Tramadol, so he is curious if this could be causing his symptoms. He last took Tramadol around 2000 last night. Patient has also used Meloxicam for pain without relief, and Oxycodone which he states \"made him feel depressed\". Patient denies any other new medications. No recent alcohol use. No history of anxiety/ depression.     Problem List:    Patient Active Problem List    Diagnosis Date Noted     Migraine without status migrainosus, not intractable, unspecified migraine type 04/04/2017     Priority: Medium     Benign essential hypertension 02/22/2017     Priority: Medium     Elevated cholesterol with elevated triglycerides 08/29/2016     Priority: Medium     Edema " 12/29/2014     Priority: Medium     Hypertension goal BP (blood pressure) < 140/90 12/29/2014     Priority: Medium     Tear of medial cartilage or meniscus of Rt knee, current 10/17/2013     Priority: Medium     Morbid obesity (H) 02/26/2013     Priority: Medium     HYPERLIPIDEMIA LDL GOAL <130 10/31/2010     Priority: Medium     Reyes's esophagus 03/04/2009     Priority: Medium     Varicose veins of lower extremities with complications 07/17/2007     Priority: Medium     Problem list name updated by automated process. Provider to review       ESOPHAGEAL REFLUX 11/27/2006     Priority: Medium     Essential hypertension, benign 10/26/2004     Priority: Medium     Hereditary and idiopathic peripheral neuropathy 05/22/2002     Priority: Medium     Problem list name updated by automated process. Provider to review       Mixed hyperlipidemia 02/05/2002     Priority: Medium     Plantar fascial fibromatosis 10/03/2001     Priority: Medium     left          Past Medical History:    Past Medical History:   Diagnosis Date     Arthritis      Reyes's esophagus with esophagitis 2008     Esophageal reflux 11/27/2006     Gastro-oesophageal reflux disease      Migraine      Morbid obesity (H) 1/22/2009     Obesity, unspecified      Pure hypercholesterolemia      SPRAIN SHOULDER/ARM NOS 12/28/2005     Tear of medial cartilage or meniscus of Rt knee, current 10/17/2013     Unspecified essential hypertension        Past Surgical History:    Past Surgical History:   Procedure Laterality Date     COLONOSCOPY  4/8/2013    Procedure: COLONOSCOPY;  Colonoscopy, Esophagogastroduodenoscopy with Single Biopsy;  Surgeon: Eddie Marshall MD;  Location: [facility]     ESOPHAGOSCOPY, GASTROSCOPY, DUODENOSCOPY (EGD), COMBINED  4/8/2013    Procedure: COMBINED ESOPHAGOSCOPY, GASTROSCOPY, DUODENOSCOPY (EGD), BIOPSY SINGLE OR MULTIPLE;;  Surgeon: Eddie Marshall MD;  Location: [facility]     ESOPHAGOSCOPY, GASTROSCOPY, DUODENOSCOPY (EGD),  COMBINED N/A 2017    Procedure: COMBINED ESOPHAGOSCOPY, GASTROSCOPY, DUODENOSCOPY (EGD), BIOPSY SINGLE OR MULTIPLE;  Surgeon: Kodi Ng MD;  Location:  GI      UGI ENDOSCOPY DIAG W BIOPSY  2008, 03/12/10     OPEN REDUCTION INTERNAL FIXATION WRIST Right 2016     REMOVE NAIL BED/FINGER TIP Left 2016     ROTATOR CUFF REPAIR RT/LT Right 3/2016       Family History:    Family History   Problem Relation Age of Onset     HEART DISEASE Father      alcohol dependant,  at age 52     Lipids Father      CEREBROVASCULAR DISEASE Brother      had an aneurysm     Arthritis Sister      rheumatiod arthritis     Hypertension Father        Social History:  Marital Status:   [2]  Social History   Substance Use Topics     Smoking status: Never Smoker     Smokeless tobacco: Never Used     Alcohol use 0.0 oz/week     0 Standard drinks or equivalent per week      Comment: rare        Medications:      LORazepam (ATIVAN) 1 MG tablet   diazepam (VALIUM) 2 MG tablet   lisinopril (PRINIVIL/ZESTRIL) 10 MG tablet   meloxicam (MOBIC) 15 MG tablet   ibuprofen (ADVIL/MOTRIN) 600 MG tablet   SUMAtriptan Succinate Refill (IMITREX) 6 MG/0.5ML SOCT   pravastatin (PRAVACHOL) 20 MG tablet   omeprazole (PRILOSEC) 40 MG capsule   SUMAtriptan (IMITREX) 100 MG tablet   sucralfate (CARAFATE) 1 GM tablet   ondansetron (ZOFRAN ODT) 4 MG ODT tab   aspirin 81 MG tablet   cetirizine (ZYRTEC) 10 MG tablet   Cholecalciferol (VITAMIN D3 PO)   MULTI VITAMIN MENS OR TABS         Review of Systems   Constitutional: Negative for activity change and appetite change.   Musculoskeletal: Positive for arthralgias (left knee).   Psychiatric/Behavioral: Positive for suicidal ideas. Negative for self-injury. The patient is nervous/anxious.    All other systems reviewed and are negative.      Physical Exam          Physical Exam   Constitutional: No distress.   HENT:   Head: Normocephalic and atraumatic.   Eyes: Conjunctivae and EOM are  normal.   Neck: Normal range of motion. Neck supple.   Cardiovascular: Normal rate, regular rhythm, normal heart sounds and intact distal pulses.    No murmur heard.  Pulmonary/Chest: Effort normal and breath sounds normal. No respiratory distress. He has no wheezes. He has no rales. He exhibits no tenderness.   Musculoskeletal:        Left knee: He exhibits swelling. He exhibits normal range of motion.   Skin: Skin is warm and dry. No rash noted. He is not diaphoretic. No pallor.   Psychiatric: His behavior is normal. His mood appears anxious. He expresses no homicidal and no suicidal ideation. He expresses no suicidal plans and no homicidal plans.   Nursing note and vitals reviewed.      ED Course     ED Course     Procedures               Critical Care time:  none               No results found for this or any previous visit (from the past 24 hour(s)).    Medications   LORazepam (ATIVAN) tablet 1 mg (1 mg Oral Given 3/10/18 1125)       MDM: Ruddy is a 57 year old male who presents to the ED complaining of anxiety since having a MRI 4 days ago. Patient's vitals stable. On exam, patient appears anxious and his left knee is swollen. Exam is otherwise unremarkable. Patient was given Ativan here in the ED with good relief. Discussed with patient's wife that she should remove the gun from the home so he does not have access to it. Encouraged the patient to rest. Prescribed Ativan 1 mg to use at home, see orders. He should discontinue Tramadol - offered an alternative pain medication but patient declined. Patient should follow up with Dr. Allen next week to discuss other medications for anxiety. Patient is in agreement with this treatment plan and stable for discharge. Follow up with Dr. Allen as instructed, or sooner with suicidal/ homicidal ideation, self harm, or other worsening symptoms.     Assessments & Plan      Adjustment disorder with mixed anxiety and depressed mood  Acute pain of left knee        I have  reviewed the nursing notes.    I have reviewed the findings, diagnosis, plan and need for follow up with the patient.       Discharge Medication List as of 3/10/2018 11:54 AM      START taking these medications    Details   LORazepam (ATIVAN) 1 MG tablet Take 1 tablet (1 mg) by mouth every 8 hours as needed for anxiety, Disp-10 tablet, R-0, Local Print             Final diagnoses:   Adjustment disorder with mixed anxiety and depressed mood   Acute pain of left knee     This document serves as a record of services personally performed by Mohinder Garibay MD. It was created on their behalf by Ritu Jacobs, a trained medical scribe. The creation of this record is based on the provider's personal observations and the statements of the patient. This document has been checked and approved by the attending provider.    Note: Chart documentation done in part with Dragon Voice Recognition software. Although reviewed after completion, some word and grammatical errors may remain.    3/10/2018   [facility] EMERGENCY DEPARTMENT       Mohinder Garibay MD  03/11/18 3625

## 2018-03-10 NOTE — ED AVS SNAPSHOT
[facility] Emergency Department    [address] [place] [address]    Phone:  [phone]    Fax:  [phone]                                       Ruddy Browning   MRN: [id number]    Department:  [facility] Emergency Department   Date of Visit:  3/10/2018           After Visit Summary Signature Page     I have received my discharge instructions, and my questions have been answered. I have discussed any challenges I see with this plan with the nurse or doctor.    ..........................................................................................................................................  Patient/Patient Representative Signature      ..........................................................................................................................................  Patient Representative Print Name and Relationship to Patient    ..................................................               ................................................  Date                                            Time    ..........................................................................................................................................  Reviewed by Signature/Title    ...................................................              ..............................................  Date                                                            Time

## 2018-03-10 NOTE — ED AVS SNAPSHOT
[facility] Emergency Department    [address] [place] [address]    Phone:  [phone]    Fax:  [phone]                                       Ruddy Browning   MRN: [id number]    Department:  [facility] Emergency Department   Date of Visit:  3/10/2018           Patient Information     Date Of Birth          1960        Your diagnoses for this visit were:     Adjustment disorder with mixed anxiety and depressed mood     Acute pain of left knee        You were seen by Mohinder Garibay MD.      Follow-up Information     Go to Maikol Allen MD.    Specialty:  Family Practice    Why:  on Wednesday as scheduled    Contact information:    [address] [place] [address] [phone]        Discharge References/Attachments     ANXIETY REACTION (ENGLISH)    ANXIETY, YOUR BODY'S RESPONSE TO  (ENGLISH)    ANXIETY DISORDERS, TREATING, WITH MEDICINE (ENGLISH)      Future Appointments        Provider Department Dept Phone Center    3/12/2018 3:30 PM Rachel Ayoub MD [facility] [phone] [facility]    3/14/2018 9:20 AM Maikol Allen MD, MD [facility] [phone] [facility]    3/16/2018 5:00 PM Tila Jones, PT [facility] [phone] [facility]    3/23/2018 4:15 PM Tila Jones, PT [facility] [phone] [facility]    3/30/2018 4:15 PM Tila Jones, PT [facility] [phone] [facility]    4/4/2018 3:00 PM Maikol Allen MD, MD [facility] [phone] [facility]      24 Hour Appointment Hotline       To make an appointment at any [facility], call [phone] ([phone]). If you don't have a family doctor or clinic, we will help you find one. [facility] are conveniently located to serve the needs of you and your family.             Review of your medicines      START taking        Dose /  Directions Last dose taken    LORazepam 1 MG tablet   Commonly known as:  ATIVAN   Dose:  1 mg   Quantity:  10 tablet        Take 1 tablet (1 mg) by mouth every 8 hours as needed for anxiety   Refills:  0          Our records show that you are taking the medicines listed below. If these are incorrect, please call your family doctor or clinic.        Dose / Directions Last dose taken    aspirin 81 MG tablet        ONE DAILY   Refills:  0        cetirizine 10 MG tablet   Commonly known as:  zyrTEC   Dose:  10 mg   Quantity:  90 tablet        Take 1 tablet (10 mg) by mouth daily   Refills:  3        diazepam 2 MG tablet   Commonly known as:  VALIUM   Dose:  2 mg   Quantity:  4 tablet        Take 1 tablet (2 mg) by mouth as needed for anxiety Take 1 tablet (2 mg) by mouth once as needed   30 min before, during and after MRI as needed for anxiety.   Refills:  0        ibuprofen 600 MG tablet   Commonly known as:  ADVIL/MOTRIN   Dose:  600 mg   Quantity:  60 tablet        Take 1 tablet (600 mg) by mouth every 6 hours as needed for moderate pain   Refills:  0        lisinopril 10 MG tablet   Commonly known as:  PRINIVIL/ZESTRIL   Quantity:  90 tablet        TAKE ONE TABLET BY MOUTH ONCE DAILY   Refills:  0        meloxicam 15 MG tablet   Commonly known as:  MOBIC   Dose:  15 mg   Quantity:  60 tablet        Take 1 tablet (15 mg) by mouth daily   Refills:  1        MULTI vitamin  MENS Tabs        1 tablet daily   Refills:  0        omeprazole 40 MG capsule   Commonly known as:  priLOSEC   Dose:  40 mg   Quantity:  90 capsule        Take 1 capsule (40 mg) by mouth daily Take 30-60 minutes before a meal.   Refills:  3        ondansetron 4 MG ODT tab   Commonly known as:  ZOFRAN ODT   Dose:  4-8 mg   Quantity:  30 tablet        Take 1-2 tablets (4-8 mg) by mouth every 6 hours as needed for nausea   Refills:  0        pravastatin 20 MG tablet   Commonly known as:  PRAVACHOL   Quantity:  90 tablet        TAKE ONE TABLET BY MOUTH  ONCE DAILY   Refills:  3        sucralfate 1 GM tablet   Commonly known as:  CARAFATE   Dose:  1 g   Quantity:  40 tablet        Take 1 tablet (1 g) by mouth 2 times daily as needed   Refills:  1        * SUMAtriptan 100 MG tablet   Commonly known as:  IMITREX   Dose:  100 mg   Quantity:  9 tablet        Take 1 tablet (100 mg) by mouth at onset of headache for migraine May repeat in 2 hours if headache not gone. Max dose is 200 mg in 24 hours   Refills:  6        * SUMAtriptan Succinate Refill 6 MG/0.5ML Soct   Commonly known as:  IMITREX   Quantity:  2 mL        INJECT 0.5ML FOR ONE DOSE. MAY REPEAT IN 1 HR, DO NOT EXCEED 2 SHOTS IN 24 HOURS   Refills:  2        VITAMIN D3 PO   Dose:  1000 Units        Take 1,000 Units by mouth daily   Refills:  0        * Notice:  This list has 2 medication(s) that are the same as other medications prescribed for you. Read the directions carefully, and ask your doctor or other care provider to review them with you.      STOP taking        Dose Reason for stopping Comments    traMADol 50 MG tablet   Commonly known as:  ULTRAM                      Prescriptions were sent or printed at these locations (1 Prescription)                   Other Prescriptions                Printed at Department/Unit printer (1 of 1)         LORazepam (ATIVAN) 1 MG tablet                Orders Needing Specimen Collection     None      Pending Results     No orders found from 3/8/2018 to 3/11/2018.            Pending Culture Results     No orders found from 3/8/2018 to 3/11/2018.            Pending Results Instructions     If you had any lab results that were not finalized at the time of your Discharge, you can call the ED Lab Result RN at [phone]. You will be contacted by this team for any positive Lab results or changes in treatment. The nurses are available 7 days a week from 10A to 6:30P.  You can leave a message 24 hours per day and they will return your call.        Thank you for choosing  "[facility]       Thank you for choosing [facility] for your care. Our goal is always to provide you with excellent care. Hearing back from our patients is one way we can continue to improve our services. Please take a few minutes to complete the written survey that you may receive in the mail after you visit with us. Thank you!        [employer] Information     [employer] lets you send messages to your doctor, view your test results, renew your prescriptions, schedule appointments and more. To sign up, go to [email or web address] . Click on \"Log in\" on the left side of the screen, which will take you to the Welcome page. Then click on \"Sign up Now\" on the right side of the page.     You will be asked to enter the access code listed below, as well as some personal information. Please follow the directions to create your username and password.     Your access code is: [email or web address]  Expires: 2018  4:49 PM     Your access code will  in 90 days. If you need help or a new code, please call your [facility] clinic or [phone].        Care EveryWhere ID     This is your Care EveryWhere ID. This could be used by other organizations to access your [facility] medical records  [id number]        Equal Access to Services     KAYLYN CASTRO : Fannie Chu, wadonna matthews, qaybta kaalmada raymond, dara romero. So [facility] [phone].    ATENCIÓN: Si habla español, tiene a ross disposición servicios gratuitos de asistencia lingüística. Llame al [phone].    We comply with applicable federal civil rights laws and [place] laws. We do not discriminate on the basis of race, color, national origin, age, disability, sex, sexual orientation, or gender identity.            After Visit Summary       This is your record. Keep this with you and show to your community pharmacist(s) and doctor(s) at your next visit.                  "

## 2018-03-12 ENCOUNTER — OFFICE VISIT (OUTPATIENT)
Dept: FAMILY MEDICINE | Facility: CLINIC | Age: 58
End: 2018-03-12
Payer: COMMERCIAL

## 2018-03-12 ENCOUNTER — TELEPHONE (OUTPATIENT)
Dept: FAMILY MEDICINE | Facility: CLINIC | Age: 58
End: 2018-03-12

## 2018-03-12 ENCOUNTER — OFFICE VISIT (OUTPATIENT)
Dept: ORTHOPEDICS | Facility: CLINIC | Age: 58
End: 2018-03-12
Payer: COMMERCIAL

## 2018-03-12 VITALS
OXYGEN SATURATION: 99 % | TEMPERATURE: 98.9 F | WEIGHT: 280 LBS | BODY MASS INDEX: 36.94 KG/M2 | SYSTOLIC BLOOD PRESSURE: 132 MMHG | DIASTOLIC BLOOD PRESSURE: 92 MMHG | RESPIRATION RATE: 20 BRPM | HEART RATE: 106 BPM

## 2018-03-12 VITALS — WEIGHT: 282 LBS | RESPIRATION RATE: 18 BRPM | BODY MASS INDEX: 37.37 KG/M2 | HEIGHT: 73 IN

## 2018-03-12 DIAGNOSIS — G47.09 OTHER INSOMNIA: ICD-10-CM

## 2018-03-12 DIAGNOSIS — T14.8XXA CONTUSION OF BONE: Primary | ICD-10-CM

## 2018-03-12 DIAGNOSIS — F41.1 GAD (GENERALIZED ANXIETY DISORDER): Primary | ICD-10-CM

## 2018-03-12 LAB — TSH SERPL DL<=0.005 MIU/L-ACNC: 0.82 MU/L (ref 0.4–4)

## 2018-03-12 PROCEDURE — 84443 ASSAY THYROID STIM HORMONE: CPT | Performed by: FAMILY MEDICINE

## 2018-03-12 PROCEDURE — 36415 COLL VENOUS BLD VENIPUNCTURE: CPT | Performed by: FAMILY MEDICINE

## 2018-03-12 PROCEDURE — 99214 OFFICE O/P EST MOD 30 MIN: CPT | Performed by: FAMILY MEDICINE

## 2018-03-12 PROCEDURE — 20610 DRAIN/INJ JOINT/BURSA W/O US: CPT | Mod: LT | Performed by: ORTHOPAEDIC SURGERY

## 2018-03-12 RX ORDER — LORAZEPAM 1 MG/1
1 TABLET ORAL EVERY 8 HOURS PRN
Qty: 10 TABLET | Refills: 0 | Status: SHIPPED | OUTPATIENT
Start: 2018-03-12 | End: 2019-04-01

## 2018-03-12 RX ORDER — CLONAZEPAM 0.5 MG/1
.5-1 TABLET ORAL
Qty: 30 TABLET | Refills: 5 | Status: SHIPPED | OUTPATIENT
Start: 2018-03-12 | End: 2018-09-28

## 2018-03-12 ASSESSMENT — ANXIETY QUESTIONNAIRES
IF YOU CHECKED OFF ANY PROBLEMS ON THIS QUESTIONNAIRE, HOW DIFFICULT HAVE THESE PROBLEMS MADE IT FOR YOU TO DO YOUR WORK, TAKE CARE OF THINGS AT HOME, OR GET ALONG WITH OTHER PEOPLE: SOMEWHAT DIFFICULT
2. NOT BEING ABLE TO STOP OR CONTROL WORRYING: SEVERAL DAYS
7. FEELING AFRAID AS IF SOMETHING AWFUL MIGHT HAPPEN: SEVERAL DAYS
6. BECOMING EASILY ANNOYED OR IRRITABLE: SEVERAL DAYS
3. WORRYING TOO MUCH ABOUT DIFFERENT THINGS: SEVERAL DAYS
1. FEELING NERVOUS, ANXIOUS, OR ON EDGE: SEVERAL DAYS
GAD7 TOTAL SCORE: 5
5. BEING SO RESTLESS THAT IT IS HARD TO SIT STILL: NOT AT ALL

## 2018-03-12 ASSESSMENT — PAIN SCALES - GENERAL
PAINLEVEL: MODERATE PAIN (5)
PAINLEVEL: NO PAIN (0)

## 2018-03-12 ASSESSMENT — PATIENT HEALTH QUESTIONNAIRE - PHQ9: 5. POOR APPETITE OR OVEREATING: NOT AT ALL

## 2018-03-12 NOTE — NURSING NOTE
"Chief Complaint   Patient presents with     RECHECK     f/u left knee lov 2/7/18 inj given        Initial Resp 18  Ht 6' 1\" (1.854 m)  Wt 282 lb (127.9 kg)  BMI 37.21 kg/m2 Estimated body mass index is 37.21 kg/(m^2) as calculated from the following:    Height as of this encounter: 6' 1\" (1.854 m).    Weight as of this encounter: 282 lb (127.9 kg).  Medication Reconciliation: complete    BP completed using cuff size: NA (Not Taken)    Charlotte Lyle MA      "

## 2018-03-12 NOTE — MR AVS SNAPSHOT
After Visit Summary   3/12/2018    Ruddy Browning    MRN: [id number]           Patient Information     Date Of Birth          1960        Visit Information        Provider Department      3/12/2018 3:00 PM Maikol Allen MD [facility]        Today's Diagnoses     SYDNEE (generalized anxiety disorder)    -  1    Other insomnia           Follow-ups after your visit        Your next 10 appointments already scheduled     Mar 14, 2018  9:20 AM CDT   Office Visit with Maikol Allen MD   [facility] ([address] [phone]           Bring a current list of meds and any records pertaining to this visit. For Physicals, please bring immunization records and any forms needing to be filled out. Please arrive 10 minutes early to complete paperwork.            Apr 04, 2018  3:00 PM CDT   Office Visit with Maikol Allen MD   [facility] ([address] [phone]           Bring a current list of meds and any records pertaining to this visit. For Physicals, please bring immunization records and any forms needing to be filled out. Please arrive 10 minutes early to complete paperwork.              Who to contact     If you have questions or need follow up information about today's clinic visit or your schedule please contact [facility] directly at [phone].  Normal or non-critical lab and imaging results will be communicated to you by MyChart, letter or phone within 4 business days after the clinic has received the results. If you do not hear from us within 7 days, please contact the clinic through MyChart or phone. If you have a critical or abnormal lab result, we will notify you by phone as soon as possible.  Submit refill requests through [employer] or call your pharmacy and they will forward the refill  "request to us. Please allow 3 business days for your refill to be completed.          Additional Information About Your Visit        [employer] Information     [employer] lets you send messages to your doctor, view your test results, renew your prescriptions, schedule appointments and more. To sign up, go to [email or web address] . Click on \"Log in\" on the left side of the screen, which will take you to the Welcome page. Then click on \"Sign up Now\" on the right side of the page.     You will be asked to enter the access code listed below, as well as some personal information. Please follow the directions to create your username and password.     Your access code is: [email or web address]  Expires: 2018  5:49 PM     Your access code will  in 90 days. If you need help or a new code, please call your [facility] clinic or [phone].        Care EveryWhere ID     This is your Care EveryWhere ID. This could be used by other organizations to access your [facility] medical records  [id number]        Your Vitals Were     Pulse Temperature Respirations Pulse Oximetry BMI (Body Mass Index)       104 98.9  F (37.2  C) (Temporal) 20 99% 36.94 kg/m2        Blood Pressure from Last 3 Encounters:   18 (!) 152/104   17 (!) 137/91   17 112/78    Weight from Last 3 Encounters:   18 280 lb (127 kg)   18 282 lb (127.9 kg)   18 291 lb (132 kg)              Today, you had the following     No orders found for display         Today's Medication Changes          These changes are accurate as of 3/12/18  4:06 PM.  If you have any questions, ask your nurse or doctor.               Start taking these medicines.        Dose/Directions    clonazePAM 0.5 MG tablet   Commonly known as:  klonoPIN   Used for:  Other insomnia   Started by:  Maikol Allen MD        Dose:  0.5-1 mg   Take 1-2 tablets (0.5-1 mg) by mouth nightly as needed for anxiety   Quantity:  30 tablet   Refills:  5       sertraline 50 MG " tablet   Commonly known as:  ZOLOFT   Used for:  SYDNEE (generalized anxiety disorder)   Started by:  Maikol Allen MD        Take 1/2 tablet every morning for 2 wks then increase to a full tab every morning   Quantity:  60 tablet   Refills:  1            Where to get your medicines      These medications were sent to [facility], [place] - [address] [facility] [address]     Phone:  [phone]     sertraline 50 MG tablet         Some of these will need a paper prescription and others can be bought over the counter.  Ask your nurse if you have questions.     Bring a paper prescription for each of these medications     clonazePAM 0.5 MG tablet    LORazepam 1 MG tablet                Primary Care Provider Office Phone # Fax #    Maikol Allen [phone]       [address]   [facility] [address]        Equal Access to Services     [facility]: Hadii liset argueta hadasho Soomaali, waaxda luqadaha, qaybta kaalmada adeegyada, dara souzain haydeidran harinder chadwick . So [facility] [phone].    ATENCIÓN: Si habla español, tiene a ross disposición servicios gratuitos de asistencia lingüística. Llame al [phone].    We comply with applicable federal civil rights laws and [place] laws. We do not discriminate on the basis of race, color, national origin, age, disability, sex, sexual orientation, or gender identity.            Thank you!     Thank you for choosing [facility]  for your care. Our goal is always to provide you with excellent care. Hearing back from our patients is one way we can continue to improve our services. Please take a few minutes to complete the written survey that you may receive in the mail after your visit with us. Thank you!             Your Updated Medication List - Protect others around you: Learn how to safely use, store and throw away your medicines at www.disposemymeds.org.          This list is accurate  as of 3/12/18  4:06 PM.  Always use your most recent med list.                   Brand Name Dispense Instructions for use Diagnosis    aspirin 81 MG tablet      ONE DAILY    Essential hypertension, benign, Mixed hyperlipidemia       cetirizine 10 MG tablet    zyrTEC    90 tablet    Take 1 tablet (10 mg) by mouth daily    Seasonal allergies       clonazePAM 0.5 MG tablet    klonoPIN    30 tablet    Take 1-2 tablets (0.5-1 mg) by mouth nightly as needed for anxiety    Other insomnia       ibuprofen 600 MG tablet    ADVIL/MOTRIN    60 tablet    Take 1 tablet (600 mg) by mouth every 6 hours as needed for moderate pain    Left knee pain, unspecified chronicity       lisinopril 10 MG tablet    PRINIVIL/ZESTRIL    90 tablet    TAKE ONE TABLET BY MOUTH ONCE DAILY    Benign essential hypertension, Essential hypertension, benign       LORazepam 1 MG tablet    ATIVAN    10 tablet    Take 1 tablet (1 mg) by mouth every 8 hours as needed for anxiety    SYDNEE (generalized anxiety disorder)       MULTI vitamin  MENS Tabs      1 tablet daily        omeprazole 40 MG capsule    priLOSEC    90 capsule    Take 1 capsule (40 mg) by mouth daily Take 30-60 minutes before a meal.    Reyes's esophagus without dysplasia       pravastatin 20 MG tablet    PRAVACHOL    90 tablet    TAKE ONE TABLET BY MOUTH ONCE DAILY    Hyperlipidemia LDL goal <130       sertraline 50 MG tablet    ZOLOFT    60 tablet    Take 1/2 tablet every morning for 2 wks then increase to a full tab every morning    SYNDEE (generalized anxiety disorder)       sucralfate 1 GM tablet    CARAFATE    40 tablet    Take 1 tablet (1 g) by mouth 2 times daily as needed    Gastroesophageal reflux disease with esophagitis, Arthralgia, unspecified joint, Reyes's esophagus without dysplasia       * SUMAtriptan 100 MG tablet    IMITREX    9 tablet    Take 1 tablet (100 mg) by mouth at onset of headache for migraine May repeat in 2 hours if headache not gone. Max dose is 200 mg in 24  hours    Migraine without status migrainosus, not intractable, unspecified migraine type       * SUMAtriptan Succinate Refill 6 MG/0.5ML Soct    IMITREX    2 mL    INJECT 0.5ML FOR ONE DOSE. MAY REPEAT IN 1 HR, DO NOT EXCEED 2 SHOTS IN 24 HOURS    Migraine headache       VITAMIN D3 PO      Take 1,000 Units by mouth daily        * Notice:  This list has 2 medication(s) that are the same as other medications prescribed for you. Read the directions carefully, and ask your doctor or other care provider to review them with you.

## 2018-03-12 NOTE — PATIENT INSTRUCTIONS
Understanding Bone Bruise (Bone Contusion)  A bone bruise is an injury to a bone that is less severe than a bone fracture. Bone bruises are fairly common. They can happen to people of all ages. Any type of bone in your body can be bruised. Other injuries often happen along with a bone bruise, such as damage to nearby ligaments.  What happens when a bone is bruised?  Bone is made of different kinds of tissue. The periosteum is a thin layer of tissue that covers most of a bone. Where bones come together, there is usually a layer of cartilage at the edges. The bone here is called subchondral bone. Deep inside the bone is an area called the medulla. It contains the bone marrow and fibrous tissue called trabeculae.  With a bone fracture, all of the trabeculae in a region of bone have broken. But with a bone bruise, an injury only damages some of these trabeculae. An injury might cause blood to build up in the area beneath the periosteum. This causes a subperiosteal hematoma, a type of bone bruise. An injury might also cause bleeding and swelling in the area between your cartilage and the bone beneath it. This causes a subchondral bone bruise. Or bleeding and swelling can occur in the medulla of your bone. This is called an intraosseous bone bruise.  What causes a bone bruise?  Injury of any kind can cause a bone bruise. Sports injuries, motor vehicle accidents, or falls from a height can cause them. Twisting injuries that cause joint sprains can also cause a bone bruise. Health conditions like arthritis may also lead to a bone bruise. This is because arthritis causes bone surfaces to grind against each other. Child abuse is another cause of bone bruises.  Symptoms of a bone bruise  Symptoms of a bone bruise can include:    Pain and soreness in the injured area    Swelling in the area and soft tissues around it    Change in color of the injured area    Swelling or stiffness of an injured joint  This pain is often more  severe and lasts longer than a soft tissue injury. How severe your symptoms are and how long they last depends on how severe the bone bruise is.  Diagnosing a bone bruise  Your healthcare provider will ask you about your medical history and symptoms. He or she will ask how you got your injury. Your provider will examine the injured area to check for pain, bruising, and swelling. After the exam, your health care provider may be able to tell if you have a bone bruise.  A bone bruise doesn t show up on an X-ray. But you may be given an X-ray to rule out a bone fracture. A fracture may need a different kind of treatment. An MRI can confirm a bone bruise. But your healthcare provider will likely only give you an MRI if your symptoms don t get better.  Date Last Reviewed: 4/1/2017 2000-2017 [employer]. [address] [place] [address]. All rights reserved. This information is not intended as a substitute for professional medical care. Always follow your healthcare professional's instructions.        Treatment for Bone Bruise (Bone Contusion)  A bone bruise is an injury to a bone that is less severe than a bone fracture. Bone bruises are fairly common. They can happen to people of all ages. Any type of bone in your body can get a bone bruise. Other injuries often happen along with a bone bruise, such as damage to nearby ligaments.  Types of treatment  Treatment for a bone bruise may include:    Resting the bone or joint    Putting an ice pack on the area several times a day    Raising the injury above the level of your heart to reduce swelling    Taking medicine to reduce pain and swelling    Wearing a brace or other device to limit movement, if needed  Your doctor may give you advice about your diet. This is because eating a diet that is rich in calcium, vitamin D, and protein can help you heal. Your doctor may ask you to not use certain over-the-counter medicines for pain. Some of these may  delay normal bone healing. If you smoke, your doctor will advise you to stop smoking. Smoking can also delay bone healing.  Your health care provider will tell you how long you should avoid putting weight on your bone. Most bone bruises slowly heal over 2 to 4 months. A larger bone bruise may take longer to heal. You may not be able to return to sports activities for weeks or months. If your symptoms don t go away, your health care provider may give you an MRI.  Possible complications of a bone bruise  Most bone bruises heal without any problems. If your bone bruise is very large, your body may have trouble getting blood flow back to the area. This can cause avascular necrosis of the bone. This leads to death of that part of the bone.     When to call the health care provider  Call your health care provider if your symptoms don t start to get better in a few days. Call him or her right away if you have any severe symptoms, such as a high fever.      Date Last Reviewed: 7/21/2015 2000-2017 [employer]. [address] [place] [address], [place] [address]. All rights reserved. This information is not intended as a substitute for professional medical care. Always follow your healthcare professional's instructions.

## 2018-03-12 NOTE — NURSING NOTE
"Chief Complaint   Patient presents with     ER F/U     3/10/2018 Anxiety        Initial Temp 98.9  F (37.2  C) (Temporal)  Wt 280 lb (127 kg)  SpO2 99%  BMI 36.94 kg/m2 Estimated body mass index is 36.94 kg/(m^2) as calculated from the following:    Height as of an earlier encounter on 3/12/18: 6' 1\" (1.854 m).    Weight as of this encounter: 280 lb (127 kg).  Medication Reconciliation: complete    "

## 2018-03-12 NOTE — PROGRESS NOTES
SUBJECTIVE:   Ruddy Browning is a 57 year old male who presents to clinic today for the following health issues:      ED/UC Followup:    Facility:  [facility]  Date of visit: 3/10/2018  Reason for visit: Anxiety  Current Status: not feeling any better.     Current notes that he has had issues with claustrophobia for a number of years but it has worsened over the past few weeks.  He had issues with being in elevators that started about 8 years ago but now it seems to be that he can be in rooms where doors are closed.  A week ago Saturday his wife shut the bedroom door and it kind of set off his claustrophobia a bit more than it had been affecting him up to this point.  He then noted that his orthopedic issues have been bothering him more lately.  He has been seeing Dr. Ayoub for his left knee and has had a number of injections the most recent one today for pain.  She had given himTramadol to see if that would help with pain because he did not like the feeling he had when he took oxycodone and the meloxicam did not work at all for him.  He said the tramadol made him feel quite goofy and actually seemed to make his anxiety significantly worse.  He was seen in the emergency department on March 10 and was given some Ativan tablets to try to get him to relax.  His wife is here with him today and stated that this has made him very groggy and slept most of the weekend.  It did seem to help his anxiety symptoms.  Today he tried to go to work and was picked up by his son.  The patient lives in [place] [address] and made it as far as the [facility] for stop lights before he made his son turned around and bring him back home.  He was feeling very claustrophobic and truck and just could not face going to work today because of the way he was feeling.    SYDNEE-7 SCORE 3/12/2018   Total Score 5     PHQ-9 SCORE 3/12/2018   Total Score 6     I reviewed his PHQ 9 and SYDNEE 7 scores today.  Talking with him  his anxiety seems much worse than the score that he gave himself today.  I think he is under reporting.  His wife cooperates that he has been quite anxious and just not dealing well particularly with being and rooms that were dorsal closed or when he is sleeping at night he needs to have light signs that he can see the door and see his surroundings.  When I talked to him he just cannot seem to quit worrying about certain things or shut his mind down and his wife has noticed also that he has become much more of a worry wart over the past 3-4 years.  His 2 adult children have noticed this also.    He does note that he has not been sleeping well except for when he uses Lorazepam.  He just cannot shut his mind down.      Problem list and histories reviewed & adjusted, as indicated.  Additional history: as documented    Patient Active Problem List   Diagnosis     Plantar fascial fibromatosis     Mixed hyperlipidemia     Hereditary and idiopathic peripheral neuropathy     Essential hypertension, benign     ESOPHAGEAL REFLUX     Varicose veins of lower extremities with complications     Reyes's esophagus     HYPERLIPIDEMIA LDL GOAL <130     Morbid obesity (H)     Tear of medial cartilage or meniscus of Rt knee, current     Edema     Hypertension goal BP (blood pressure) < 140/90     Elevated cholesterol with elevated triglycerides     Benign essential hypertension     Migraine without status migrainosus, not intractable, unspecified migraine type     Contusion of bone     SYDNEE (generalized anxiety disorder)     Past Surgical History:   Procedure Laterality Date     COLONOSCOPY  4/8/2013    Procedure: COLONOSCOPY;  Colonoscopy, Esophagogastroduodenoscopy with Single Biopsy;  Surgeon: Eddie Marshall MD;  Location:  GI     ESOPHAGOSCOPY, GASTROSCOPY, DUODENOSCOPY (EGD), COMBINED  4/8/2013    Procedure: COMBINED ESOPHAGOSCOPY, GASTROSCOPY, DUODENOSCOPY (EGD), BIOPSY SINGLE OR MULTIPLE;;  Surgeon: Eddie Marshall  MD Trent;  Location: [facility]     ESOPHAGOSCOPY, GASTROSCOPY, DUODENOSCOPY (EGD), COMBINED N/A 2017    Procedure: COMBINED ESOPHAGOSCOPY, GASTROSCOPY, DUODENOSCOPY (EGD), BIOPSY SINGLE OR MULTIPLE;  Surgeon: Kodi Ng MD;  Location: [facility] GI     [facility] UGI ENDOSCOPY DIAG W BIOPSY  2008, 03/12/10     OPEN REDUCTION INTERNAL FIXATION WRIST Right 2016     REMOVE NAIL BED/FINGER TIP Left 2016     ROTATOR CUFF REPAIR RT/LT Right 3/2016       Social History   Substance Use Topics     Smoking status: Never Smoker     Smokeless tobacco: Never Used     Alcohol use 0.0 oz/week     0 Standard drinks or equivalent per week      Comment: rare     Family History   Problem Relation Age of Onset     HEART DISEASE Father      alcohol dependant,  at age 52     Lipids Father      CEREBROVASCULAR DISEASE Brother      had an aneurysm     Arthritis Sister      rheumatiod arthritis     Hypertension Father          Allergies   Allergen Reactions     No Known Drug Allergies      Tramadol      Anxiety      Recent Labs   Lab Test  18   1617  18   0807  17   1415  17   1601  12/10/16   0804  16   1652   14   1821   LDL   --   109*   --    --   111*  Cannot estimate LDL when triglyceride exceeds 400 mg/dL  89   < >   --    HDL   --   41   --    --   35*  30*   < >   --    TRIG   --   125   --    --   169*  592*   < >   --    ALT   --   35  42  39   --   42   < >   --    CR   --   0.90  2.07*  1.66*   --   0.98   < >  1.01   GFRESTIMATED   --   87  33*  43*   --   80   < >  77   GFRESTBLACK   --   >90  40*  52*   --   >90   GFR Calc     < >  >90   GFR Calc     POTASSIUM   --   4.3  3.7  4.0   --   4.0   < >  3.8   TSH  0.82   --    --    --    --    --    --   2.97    < > = values in this interval not displayed.      BP Readings from Last 3 Encounters:   18 (!) 132/92   17 (!) 137/91   17 112/78    Wt Readings from Last 3 Encounters:    03/12/18 280 lb (127 kg)   03/12/18 282 lb (127.9 kg)   02/26/18 291 lb (132 kg)                  Labs reviewed in EPIC    Reviewed and updated as needed this visit by clinical staff  Tobacco  Allergies  Meds  Problems       Reviewed and updated as needed this visit by Provider         ROS:  Constitutional, HEENT, cardiovascular, pulmonary, gi and gu systems are negative, except as otherwise noted.    OBJECTIVE:     BP (!) 132/92  Pulse 106  Temp 98.9  F (37.2  C) (Temporal)  Resp 20  Wt 280 lb (127 kg)  SpO2 99%  BMI 36.94 kg/m2  Body mass index is 36.94 kg/(m^2).  GENERAL: healthy, alert and no distress  NECK: no adenopathy, no asymmetry, masses, or scars and thyroid normal to palpation  RESP: lungs clear to auscultation - no rales, rhonchi or wheezes  CV: regular rate and rhythm, normal S1 S2, no S3 or S4, no murmur, click or rub, no peripheral edema and peripheral pulses strong  MS: no edema    Diagnostic Test Results:  Results for orders placed or performed in visit on 03/12/18 (from the past 24 hour(s))   TSH with free T4 reflex   Result Value Ref Range    TSH 0.82 0.40 - 4.00 mU/L       ASSESSMENT/PLAN:   (F41.1) SYDNEE (generalized anxiety disorder)  (primary encounter diagnosis)  Comment: I think he is having some generalized anxiety related to his claustrophobia and for some reason things worsen when he started taking tramadol which he quit taking now and when he had that inciting incident with his wife a week of Saturday where the bedroom door got closed a bit louder than normal.  Plan: sertraline (ZOLOFT) 50 MG tablet, LORazepam         (ATIVAN) 1 MG tablet, TSH with free T4 reflex        Restart him on 25 mg of sertraline for the next 7-10 days and then increase to 50 mg every morning.  We talked about reportable side effects and the rationale behind using a medication like this that helps modulate the serotonin levels in the brain.  I did give him a refill of his Lorazepam to use only when he  feels like he is about the panic I want him to use a half a tablet initially.  He is aware that this medication can be addicting.  I also offered him some therapy because he would benefit from some cognitive behavioral therapy or CBT.  He would like to wait until he is on the medication to see how he responds.  He has a follow-up appointment with me in the first week of April.      (G47.09) Other insomnia  Comment: I think instead of using Lorazepam to sleep this can be quite addicting we need to switch him to something different and temporarily use it until he is sleeping better and his anxiety is under better control  Plan: clonazePAM (KLONOPIN) 0.5 MG tablet        Using clonazepam 0.5 mg tablets 1-2 tablets an hour before bedtime can help relax the brain and settle it down so that he is not perseverating so much.  He should be able to fall asleep easier and that hopefully will need Lorazepam less.    Electronically signed by:  Maikol Allen M.D.  3/12/2018

## 2018-03-12 NOTE — LETTER
"    3/12/2018         RE: Ruddy Browning  [address] [place] [id number]        Dear Colleague,    Thank you for referring your patient, Ruddy Browning, to the [facility]. Please see a copy of my visit note below.    Office Visit-Follow up    Chief Complaint: Ruddy Browning is a 57 year old male who is being seen for   Chief Complaint   Patient presents with     RECHECK     f/u left knee lov 2/7/18 inj given        History of Present Illness:   Had a bad reaction to the ultram - it gave him severe anxiety and claustrophobia - he went to the ER, it is getting better  No side effects from the synvisc, explained it may not work for 2 months  Feels a knife sensation medial knee, no mechanical symptoms  Taking tylenol arthritis  Did go to PT      REVIEW OF SYSTEMS  General: negative for, night sweats, dizziness, fatigue  Resp: No shortness of breath and no cough  CV: negative for chest pain, syncope or near-syncope  GI: negative for nausea, vomiting and diarrhea  : negative for dysuria and hematuria  Musculoskeletal: as above  Neurologic: negative for syncope   Hematologic: negative for bleeding disorder    Physical Exam:  Vitals: Resp 18  Ht 1.854 m (6' 1\")  Wt 127.9 kg (282 lb)  BMI 37.21 kg/m2  BMI= Body mass index is 37.21 kg/(m^2).  Constitutional: healthy, alert and no acute distress   Psychiatric: mentation appears normal and affect normal/bright  NEURO: no focal deficits  RESP: Normal with easy respirations and no use of accessory muscles to breathe, no audible wheezing or retractions  CV: No peripheral edema  SKIN: No erythema, rashes, excoriation, or breakdown. No evidence of infection.   JOINT/EXTREMITIES:left Knee Exam: Inspection: AP/lateral alignment normal, No effusion, No quad atrophy  Tender: none, deep pain MFC  Non-tender: lateral joint line, medial joint line  Active Range of Motion: pain with flexion  Strength: normal    GAIT: antalgic            Diagnostic " Modalities:  left knee MRI:  medial meniscus tear. stress reaction medial compartment.  Independent visualization of the images was performed.      Impression: left Medial meniscus tear - not very symptomatic from this  Left knee bone contusion/stress fracture medial femoral condyle and medial tibial plateau - this is causing his pain, can take 6 months to feel better    Plan:  All of the above pertinent physical exam and imaging modalities findings was reviewed with Ruddy and his wife                                          CONSERVATIVE CARE:  I recommend conservative care for the patient to include Tylenol, focused self directed physical therapy, steroid injections, activity modifications. Today I provided or dispensed info.                                        INJECTION PROCEDURE:  The patient was counseled about an  injection, including discussion of risks (including infection), contents of the injection, rationale for performing the injection, and expected benefits of the injection. The skin was prepped with alcohol and betadine and then utilizing sterile technique an injection of the left knee joint from the superior lateral approach in the supine position was performed. The injection consisted 1ml of Kenalog (40mg per 1ml) with 8ml 1% lidocaine plain. The patient tolerated the injection well, and there were no complications. The injection site was covered with a Band-Aid. The injection was performed by Rachel Ayoub M.D.                                                FUTURE PLAN:  On their return if they still have symptoms we will consider injection of steroids.        Return to clinic 6, week(s), or sooner as needed for changes.  Re-x-ray on return: No    Rachel Ayoub M.D.          Again, thank you for allowing me to participate in the care of your patient.        Sincerely,        Rachel Ayoub MD

## 2018-03-12 NOTE — TELEPHONE ENCOUNTER
Reason for Call:  Same Day Appointment, Requested Provider:  Maikol Allen M.D.    PCP: Maikol Allen    Reason for visit: anxiety, emergency room follow up    Duration of symptoms: was seen in the ED on 3-10-19    Have you been treated for this in the past? Yes    Additional comments: Emy is calling this morning asking Dr Evans to work Ruddy into his schedule asap to see him for his anxiety and emergency room follow up, he was seen in the emergency room on Saturday and has an appointment for Wednesday but Adelaida feels he needs to be seen today. Ruddy tried to go to work this morning but had to turn around after getting part way to work and come home, said he couldn't go to work. Emy is aware Dr Allen is out until noon, she declined speaking to a nurse and said he was ok to wait for Dr Allen to work Ruddy into his schedule.    Can we leave a detailed message on this number? YES    Phone number patient can be reached at: Other phone number:  [phone] *    Best Time: anytime    Call taken on 3/12/2018 at 8:04 AM by Yoly Hampton

## 2018-03-12 NOTE — PROGRESS NOTES
"Office Visit-Follow up    Chief Complaint: Ruddy Browning is a 57 year old male who is being seen for   Chief Complaint   Patient presents with     RECHECK     f/u left knee lov 2/7/18 inj given        History of Present Illness:   Had a bad reaction to the ultram - it gave him severe anxiety and claustrophobia - he went to the ER, it is getting better  No side effects from the synvisc, explained it may not work for 2 months  Feels a knife sensation medial knee, no mechanical symptoms  Taking tylenol arthritis  Did go to PT      REVIEW OF SYSTEMS  General: negative for, night sweats, dizziness, fatigue  Resp: No shortness of breath and no cough  CV: negative for chest pain, syncope or near-syncope  GI: negative for nausea, vomiting and diarrhea  : negative for dysuria and hematuria  Musculoskeletal: as above  Neurologic: negative for syncope   Hematologic: negative for bleeding disorder    Physical Exam:  Vitals: Resp 18  Ht 1.854 m (6' 1\")  Wt 127.9 kg (282 lb)  BMI 37.21 kg/m2  BMI= Body mass index is 37.21 kg/(m^2).  Constitutional: healthy, alert and no acute distress   Psychiatric: mentation appears normal and affect normal/bright  NEURO: no focal deficits  RESP: Normal with easy respirations and no use of accessory muscles to breathe, no audible wheezing or retractions  CV: No peripheral edema  SKIN: No erythema, rashes, excoriation, or breakdown. No evidence of infection.   JOINT/EXTREMITIES:left Knee Exam: Inspection: AP/lateral alignment normal, No effusion, No quad atrophy  Tender: none, deep pain MFC  Non-tender: lateral joint line, medial joint line  Active Range of Motion: pain with flexion  Strength: normal    GAIT: antalgic            Diagnostic Modalities:  left knee MRI:  medial meniscus tear. stress reaction medial compartment.  Independent visualization of the images was performed.      Impression: left Medial meniscus tear - not very symptomatic from this  Left knee bone contusion/stress " fracture medial femoral condyle and medial tibial plateau - this is causing his pain, can take 6 months to feel better    Plan:  All of the above pertinent physical exam and imaging modalities findings was reviewed with Ruddy and his wife                                          CONSERVATIVE CARE:  I recommend conservative care for the patient to include Tylenol, focused self directed physical therapy, steroid injections, activity modifications. Today I provided or dispensed info.                                        INJECTION PROCEDURE:  The patient was counseled about an  injection, including discussion of risks (including infection), contents of the injection, rationale for performing the injection, and expected benefits of the injection. The skin was prepped with alcohol and betadine and then utilizing sterile technique an injection of the left knee joint from the superior lateral approach in the supine position was performed. The injection consisted 1ml of Kenalog (40mg per 1ml) with 8ml 1% lidocaine plain. The patient tolerated the injection well, and there were no complications. The injection site was covered with a Band-Aid. The injection was performed by Rachel Ayoub M.D.                                                FUTURE PLAN:  On their return if they still have symptoms we will consider injection of steroids.        Return to clinic 6, week(s), or sooner as needed for changes.  Re-x-ray on return: No    Rachel Ayoub M.D.

## 2018-03-12 NOTE — MR AVS SNAPSHOT
After Visit Summary   3/12/2018    Ruddy Browning    MRN: [id number]           Patient Information     Date Of Birth          1960        Visit Information        Provider Department      3/12/2018 9:20 AM Rachel Ayoub MD [facility] Instructions      Understanding Bone Bruise (Bone Contusion)  A bone bruise is an injury to a bone that is less severe than a bone fracture. Bone bruises are fairly common. They can happen to people of all ages. Any type of bone in your body can be bruised. Other injuries often happen along with a bone bruise, such as damage to nearby ligaments.  What happens when a bone is bruised?  Bone is made of different kinds of tissue. The periosteum is a thin layer of tissue that covers most of a bone. Where bones come together, there is usually a layer of cartilage at the edges. The bone here is called subchondral bone. Deep inside the bone is an area called the medulla. It contains the bone marrow and fibrous tissue called trabeculae.  With a bone fracture, all of the trabeculae in a region of bone have broken. But with a bone bruise, an injury only damages some of these trabeculae. An injury might cause blood to build up in the area beneath the periosteum. This causes a subperiosteal hematoma, a type of bone bruise. An injury might also cause bleeding and swelling in the area between your cartilage and the bone beneath it. This causes a subchondral bone bruise. Or bleeding and swelling can occur in the medulla of your bone. This is called an intraosseous bone bruise.  What causes a bone bruise?  Injury of any kind can cause a bone bruise. Sports injuries, motor vehicle accidents, or falls from a height can cause them. Twisting injuries that cause joint sprains can also cause a bone bruise. Health conditions like arthritis may also lead to a bone bruise. This is because arthritis causes bone surfaces to grind against each other. Child  abuse is another cause of bone bruises.  Symptoms of a bone bruise  Symptoms of a bone bruise can include:    Pain and soreness in the injured area    Swelling in the area and soft tissues around it    Change in color of the injured area    Swelling or stiffness of an injured joint  This pain is often more severe and lasts longer than a soft tissue injury. How severe your symptoms are and how long they last depends on how severe the bone bruise is.  Diagnosing a bone bruise  Your healthcare provider will ask you about your medical history and symptoms. He or she will ask how you got your injury. Your provider will examine the injured area to check for pain, bruising, and swelling. After the exam, your health care provider may be able to tell if you have a bone bruise.  A bone bruise doesn t show up on an X-ray. But you may be given an X-ray to rule out a bone fracture. A fracture may need a different kind of treatment. An MRI can confirm a bone bruise. But your healthcare provider will likely only give you an MRI if your symptoms don t get better.  Date Last Reviewed: 4/1/2017 2000-2017 [employer]. [address] [place], [place] [id number]. All rights reserved. This information is not intended as a substitute for professional medical care. Always follow your healthcare professional's instructions.        Treatment for Bone Bruise (Bone Contusion)  A bone bruise is an injury to a bone that is less severe than a bone fracture. Bone bruises are fairly common. They can happen to people of all ages. Any type of bone in your body can get a bone bruise. Other injuries often happen along with a bone bruise, such as damage to nearby ligaments.  Types of treatment  Treatment for a bone bruise may include:    Resting the bone or joint    Putting an ice pack on the area several times a day    Raising the injury above the level of your heart to reduce swelling    Taking medicine to reduce pain and  swelling    Wearing a brace or other device to limit movement, if needed  Your doctor may give you advice about your diet. This is because eating a diet that is rich in calcium, vitamin D, and protein can help you heal. Your doctor may ask you to not use certain over-the-counter medicines for pain. Some of these may delay normal bone healing. If you smoke, your doctor will advise you to stop smoking. Smoking can also delay bone healing.  Your health care provider will tell you how long you should avoid putting weight on your bone. Most bone bruises slowly heal over 2 to 4 months. A larger bone bruise may take longer to heal. You may not be able to return to sports activities for weeks or months. If your symptoms don t go away, your health care provider may give you an MRI.  Possible complications of a bone bruise  Most bone bruises heal without any problems. If your bone bruise is very large, your body may have trouble getting blood flow back to the area. This can cause avascular necrosis of the bone. This leads to death of that part of the bone.     When to call the health care provider  Call your health care provider if your symptoms don t start to get better in a few days. Call him or her right away if you have any severe symptoms, such as a high fever.      Date Last Reviewed: 7/21/2015 2000-2017 [employer]. [address] [place] [address]. All rights reserved. This information is not intended as a substitute for professional medical care. Always follow your healthcare professional's instructions.                Follow-ups after your visit        Your next 10 appointments already scheduled     Mar 14, 2018  9:20 AM CDT   [facility]ol [facility] MD   [facility]    [address] [place] [id number] [phone]           Bring a current list of meds and any records pertaining to this visit. For Physicals, please  bring immunization records and any forms needing to be filled out. Please arrive 10 minutes early to complete paperwork.            Mar 16, 2018  5:00 PM CDT   Ortho Treatment with Tila Jones, PT   [facility] ([facility])    [address] [place] [address] [address] [place] [address]   [phone]            Mar 23, 2018  4:15 PM CDT   Ortho Treatment with Tila Jones, PT   [facility] ([facility])    [address] [place] [address]   [phone]            Mar 30, 2018  4:15 PM CDT   Ortho Treatment with Tila Jones, PT   [facility] ([facility])    [address] [place] [address]   [phone]            Apr 04, 2018  3:00 PM CDT   Office Visit with Maikol Allen MD   [facility])    [address] [address]   [phone]           Bring a current list of meds and any records pertaining to this visit. For Physicals, please bring immunization records and any forms needing to be filled out. Please arrive 10 minutes early to complete paperwork.              Who to contact     If you have questions or need follow up information about today's clinic visit or your schedule please contact [facility] directly at [phone].  Normal or non-critical lab and imaging results will be communicated to you by MyChart, letter or phone within 4 business days after the clinic has received the results. If you do not hear from us within 7 days, please contact the clinic through MyChart or phone. If you have a critical or abnormal lab result, we will notify you by phone as soon as possible.  Submit refill requests through [employer] or call your pharmacy and they will forward the refill request to us. Please allow 3 business days for your refill to be completed.          Additional Information About Your Visit       "  MyChart Information     [employer] lets you send messages to your doctor, view your test results, renew your prescriptions, schedule appointments and more. To sign up, go to [email or web address] . Click on \"Log in\" on the left side of the screen, which will take you to the Welcome page. Then click on \"Sign up Now\" on the right side of the page.     You will be asked to enter the access code listed below, as well as some personal information. Please follow the directions to create your username and password.     Your access code is: [email or web address]  Expires: 2018  5:49 PM     Your access code will  in 90 days. If you need help or a new code, please call your [place] clinic or [phone].        Care EveryWhere ID     This is your Care EveryWhere ID. This could be used by other organizations to access your [facility] medical records  [id number]        Your Vitals Were     Respirations Height BMI (Body Mass Index)             18 6' 1\" (1.854 m) 37.21 kg/m2          Blood Pressure from Last 3 Encounters:   17 (!) 137/91   17 112/78   17 (!) 86/55    Weight from Last 3 Encounters:   18 282 lb (127.9 kg)   18 291 lb (132 kg)   18 288 lb (130.6 kg)              Today, you had the following     No orders found for display       Primary Care Provider Office Phone # Fax #    Maikol Allen [phone]       [address] [place] [address]        Equal Access to Services     [facility] : Hadsoumya Chu, maryse matthews, dara hu. So [facility] [phone].    ATENCIÓN: Si habla español, tiene a ross disposición servicios gratuitos de asistencia lingüística. Jack al [phone].    We comply with applicable federal civil rights laws and [place] laws. We do not discriminate on the basis of race, color, national origin, age, disability, sex, sexual orientation, or gender " identity.            Thank you!     Thank you for choosing [facility]  for your care. Our goal is always to provide you with excellent care. Hearing back from our patients is one way we can continue to improve our services. Please take a few minutes to complete the written survey that you may receive in the mail after your visit with us. Thank you!             Your Updated Medication List - Protect others around you: Learn how to safely use, store and throw away your medicines at www.disposemymeds.org.          This list is accurate as of 3/12/18  9:55 AM.  Always use your most recent med list.                   Brand Name Dispense Instructions for use Diagnosis    aspirin 81 MG tablet      ONE DAILY    Essential hypertension, benign, Mixed hyperlipidemia       cetirizine 10 MG tablet    zyrTEC    90 tablet    Take 1 tablet (10 mg) by mouth daily    Seasonal allergies       diazepam 2 MG tablet    VALIUM    4 tablet    Take 1 tablet (2 mg) by mouth as needed for anxiety Take 1 tablet (2 mg) by mouth once as needed   30 min before, during and after MRI as needed for anxiety.    Post-traumatic osteoarthritis of both knees       ibuprofen 600 MG tablet    ADVIL/MOTRIN    60 tablet    Take 1 tablet (600 mg) by mouth every 6 hours as needed for moderate pain    Left knee pain, unspecified chronicity       lisinopril 10 MG tablet    PRINIVIL/ZESTRIL    90 tablet    TAKE ONE TABLET BY MOUTH ONCE DAILY    Benign essential hypertension, Essential hypertension, benign       LORazepam 1 MG tablet    ATIVAN    10 tablet    Take 1 tablet (1 mg) by mouth every 8 hours as needed for anxiety        meloxicam 15 MG tablet    MOBIC    60 tablet    Take 1 tablet (15 mg) by mouth daily    Primary osteoarthritis of left knee       MULTI vitamin  MENS Tabs      1 tablet daily        omeprazole 40 MG capsule    priLOSEC    90 capsule    Take 1 capsule (40 mg) by mouth daily Take 30-60 minutes before a meal.    Eric's  esophagus without dysplasia       ondansetron 4 MG ODT tab    ZOFRAN ODT    30 tablet    Take 1-2 tablets (4-8 mg) by mouth every 6 hours as needed for nausea    Stomach flu       pravastatin 20 MG tablet    PRAVACHOL    90 tablet    TAKE ONE TABLET BY MOUTH ONCE DAILY    Hyperlipidemia LDL goal <130       sucralfate 1 GM tablet    CARAFATE    40 tablet    Take 1 tablet (1 g) by mouth 2 times daily as needed    Gastroesophageal reflux disease with esophagitis, Arthralgia, unspecified joint, Reyes's esophagus without dysplasia       * SUMAtriptan 100 MG tablet    IMITREX    9 tablet    Take 1 tablet (100 mg) by mouth at onset of headache for migraine May repeat in 2 hours if headache not gone. Max dose is 200 mg in 24 hours    Migraine without status migrainosus, not intractable, unspecified migraine type       * SUMAtriptan Succinate Refill 6 MG/0.5ML Soct    IMITREX    2 mL    INJECT 0.5ML FOR ONE DOSE. MAY REPEAT IN 1 HR, DO NOT EXCEED 2 SHOTS IN 24 HOURS    Migraine headache       VITAMIN D3 PO      Take 1,000 Units by mouth daily        * Notice:  This list has 2 medication(s) that are the same as other medications prescribed for you. Read the directions carefully, and ask your doctor or other care provider to review them with you.

## 2018-03-13 ASSESSMENT — PATIENT HEALTH QUESTIONNAIRE - PHQ9: SUM OF ALL RESPONSES TO PHQ QUESTIONS 1-9: 6

## 2018-03-13 ASSESSMENT — ANXIETY QUESTIONNAIRES: GAD7 TOTAL SCORE: 5

## 2018-04-04 ENCOUNTER — OFFICE VISIT (OUTPATIENT)
Dept: ORTHOPEDICS | Facility: CLINIC | Age: 58
End: 2018-04-04
Payer: COMMERCIAL

## 2018-04-04 ENCOUNTER — OFFICE VISIT (OUTPATIENT)
Dept: FAMILY MEDICINE | Facility: CLINIC | Age: 58
End: 2018-04-04
Payer: COMMERCIAL

## 2018-04-04 VITALS
DIASTOLIC BLOOD PRESSURE: 86 MMHG | BODY MASS INDEX: 37.07 KG/M2 | HEART RATE: 76 BPM | WEIGHT: 281 LBS | RESPIRATION RATE: 16 BRPM | SYSTOLIC BLOOD PRESSURE: 134 MMHG | TEMPERATURE: 97.9 F | OXYGEN SATURATION: 100 %

## 2018-04-04 VITALS
DIASTOLIC BLOOD PRESSURE: 86 MMHG | HEART RATE: 77 BPM | WEIGHT: 281 LBS | SYSTOLIC BLOOD PRESSURE: 136 MMHG | BODY MASS INDEX: 37.07 KG/M2 | TEMPERATURE: 97.8 F

## 2018-04-04 DIAGNOSIS — F41.1 GAD (GENERALIZED ANXIETY DISORDER): ICD-10-CM

## 2018-04-04 DIAGNOSIS — T14.8XXA CONTUSION OF BONE: Primary | ICD-10-CM

## 2018-04-04 DIAGNOSIS — K22.70 BARRETT'S ESOPHAGUS WITHOUT DYSPLASIA: ICD-10-CM

## 2018-04-04 DIAGNOSIS — I10 ESSENTIAL HYPERTENSION, BENIGN: ICD-10-CM

## 2018-04-04 DIAGNOSIS — S83.242D TEAR OF MEDIAL MENISCUS OF LEFT KNEE, CURRENT, UNSPECIFIED TEAR TYPE, SUBSEQUENT ENCOUNTER: ICD-10-CM

## 2018-04-04 DIAGNOSIS — E78.5 HYPERLIPIDEMIA LDL GOAL <130: ICD-10-CM

## 2018-04-04 PROCEDURE — 99214 OFFICE O/P EST MOD 30 MIN: CPT | Performed by: FAMILY MEDICINE

## 2018-04-04 PROCEDURE — 99212 OFFICE O/P EST SF 10 MIN: CPT | Performed by: ORTHOPAEDIC SURGERY

## 2018-04-04 RX ORDER — PRAVASTATIN SODIUM 20 MG
20 TABLET ORAL DAILY
Qty: 90 TABLET | Refills: 3 | Status: SHIPPED | OUTPATIENT
Start: 2018-04-04 | End: 2019-04-01

## 2018-04-04 RX ORDER — LISINOPRIL 10 MG/1
10 TABLET ORAL DAILY
Qty: 90 TABLET | Refills: 3 | Status: SHIPPED | OUTPATIENT
Start: 2018-04-04 | End: 2019-04-01

## 2018-04-04 RX ORDER — OMEPRAZOLE 40 MG/1
40 CAPSULE, DELAYED RELEASE ORAL DAILY
Qty: 90 CAPSULE | Refills: 3 | Status: SHIPPED | OUTPATIENT
Start: 2018-04-04 | End: 2019-04-01

## 2018-04-04 ASSESSMENT — ANXIETY QUESTIONNAIRES
2. NOT BEING ABLE TO STOP OR CONTROL WORRYING: SEVERAL DAYS
IF YOU CHECKED OFF ANY PROBLEMS ON THIS QUESTIONNAIRE, HOW DIFFICULT HAVE THESE PROBLEMS MADE IT FOR YOU TO DO YOUR WORK, TAKE CARE OF THINGS AT HOME, OR GET ALONG WITH OTHER PEOPLE: NOT DIFFICULT AT ALL
GAD7 TOTAL SCORE: 5
1. FEELING NERVOUS, ANXIOUS, OR ON EDGE: SEVERAL DAYS
5. BEING SO RESTLESS THAT IT IS HARD TO SIT STILL: NOT AT ALL
6. BECOMING EASILY ANNOYED OR IRRITABLE: SEVERAL DAYS
3. WORRYING TOO MUCH ABOUT DIFFERENT THINGS: SEVERAL DAYS
7. FEELING AFRAID AS IF SOMETHING AWFUL MIGHT HAPPEN: SEVERAL DAYS

## 2018-04-04 ASSESSMENT — PATIENT HEALTH QUESTIONNAIRE - PHQ9: 5. POOR APPETITE OR OVEREATING: NOT AT ALL

## 2018-04-04 ASSESSMENT — PAIN SCALES - GENERAL
PAINLEVEL: NO PAIN (0)
PAINLEVEL: NO PAIN (0)

## 2018-04-04 NOTE — MR AVS SNAPSHOT
"              After Visit Summary   4/4/2018    Ruddy Browning    MRN: [id number]           Patient Information     Date Of Birth          1960        Visit Information        Provider Department      4/4/2018 3:00 PM Maikol Allen MD [facility]         Follow-ups after your visit        Your next 10 appointments already scheduled     Apr 09, 2018  9:00 AM CDT   Return Visit with Rachel Ayoub MD   [facility]    [address] [place] [id number] [phone]              Who to contact     If you have questions or need follow up information about today's clinic visit or your schedule please contact [facility] directly at [phone].  Normal or non-critical lab and imaging results will be communicated to you by MyChart, letter or phone within 4 business days after the clinic has received the results. If you do not hear from us within 7 days, please contact the clinic through MyChart or phone. If you have a critical or abnormal lab result, we will notify you by phone as soon as possible.  Submit refill requests through [employer] or call your pharmacy and they will forward the refill request to us. Please allow 3 business days for your refill to be completed.          Additional Information About Your Visit        MyChart Information     [employer] lets you send messages to your doctor, view your test results, renew your prescriptions, schedule appointments and more. To sign up, go to [email or web address] . Click on \"Log in\" on the left side of the screen, which will take you to the Welcome page. Then click on \"Sign up Now\" on the right side of the page.     You will be asked to enter the access code listed below, as well as some personal information. Please follow the directions to create your username and password.     Your access code is: [email or web address]  Expires: 5/8/2018  5:49 PM     Your access code will "  in 90 days. If you need help or a new code, please call your [place] clinic or [phone].        Care EveryWhere ID     This is your Care EveryWhere ID. This could be used by other organizations to access your [place] medical records  [id number]        Your Vitals Were     Pulse Temperature Respirations Pulse Oximetry BMI (Body Mass Index)       76 97.9  F (36.6  C) (Temporal) 16 100% 37.07 kg/m2        Blood Pressure from Last 3 Encounters:   18 134/86   18 136/86   18 (!) 132/92    Weight from Last 3 Encounters:   18 281 lb (127.5 kg)   18 281 lb (127.5 kg)   18 280 lb (127 kg)              Today, you had the following     No orders found for display       Primary Care Provider Office Phone # Fax #    Maikol Allen [phone]       [address] [place] [address]        Equal Access to Services     TYREE CASTRO : Hadii aad ku hadasho Soomaali, waaxda luqadaha, qaybta kaalmada adeegyada, dara weir haychucky chadwick . So [facility] [phone].    ATENCIÓN: Si habla español, tiene a ross disposición servicios gratuitos de asistencia lingüística. Llame al [phone].    We comply with applicable federal civil rights laws and [place] laws. We do not discriminate on the basis of race, color, national origin, age, disability, sex, sexual orientation, or gender identity.            Thank you!     Thank you for choosing [facility]  for your care. Our goal is always to provide you with excellent care. Hearing back from our patients is one way we can continue to improve our services. Please take a few minutes to complete the written survey that you may receive in the mail after your visit with us. Thank you!             Your Updated Medication List - Protect others around you: Learn how to safely use, store and throw away your medicines at www.disposemymeds.org.          This list is accurate as of 18  3:19 PM.   Always use your most recent med list.                   Brand Name Dispense Instructions for use Diagnosis    aspirin 81 MG tablet      ONE DAILY    Essential hypertension, benign, Mixed hyperlipidemia       cetirizine 10 MG tablet    zyrTEC    90 tablet    Take 1 tablet (10 mg) by mouth daily    Seasonal allergies       clonazePAM 0.5 MG tablet    klonoPIN    30 tablet    Take 1-2 tablets (0.5-1 mg) by mouth nightly as needed for anxiety    Other insomnia       ibuprofen 600 MG tablet    ADVIL/MOTRIN    60 tablet    Take 1 tablet (600 mg) by mouth every 6 hours as needed for moderate pain    Left knee pain, unspecified chronicity       lisinopril 10 MG tablet    PRINIVIL/ZESTRIL    90 tablet    TAKE ONE TABLET BY MOUTH ONCE DAILY    Benign essential hypertension, Essential hypertension, benign       LORazepam 1 MG tablet    ATIVAN    10 tablet    Take 1 tablet (1 mg) by mouth every 8 hours as needed for anxiety    SYDNEE (generalized anxiety disorder)       MULTI vitamin  MENS Tabs      1 tablet daily        omeprazole 40 MG capsule    priLOSEC    90 capsule    Take 1 capsule (40 mg) by mouth daily Take 30-60 minutes before a meal.    Reyes's esophagus without dysplasia       pravastatin 20 MG tablet    PRAVACHOL    90 tablet    TAKE ONE TABLET BY MOUTH ONCE DAILY    Hyperlipidemia LDL goal <130       sertraline 50 MG tablet    ZOLOFT    60 tablet    Take 1/2 tablet every morning for 2 wks then increase to a full tab every morning    SYDNEE (generalized anxiety disorder)       sucralfate 1 GM tablet    CARAFATE    40 tablet    Take 1 tablet (1 g) by mouth 2 times daily as needed    Gastroesophageal reflux disease with esophagitis, Arthralgia, unspecified joint, Reyes's esophagus without dysplasia       * SUMAtriptan 100 MG tablet    IMITREX    9 tablet    Take 1 tablet (100 mg) by mouth at onset of headache for migraine May repeat in 2 hours if headache not gone. Max dose is 200 mg in 24 hours    Migraine without  status migrainosus, not intractable, unspecified migraine type       * SUMAtriptan Succinate Refill 6 MG/0.5ML Soct    IMITREX    2 mL    INJECT 0.5ML FOR ONE DOSE. MAY REPEAT IN 1 HR, DO NOT EXCEED 2 SHOTS IN 24 HOURS    Migraine headache       VITAMIN D3 PO      Take 1,000 Units by mouth daily        * Notice:  This list has 2 medication(s) that are the same as other medications prescribed for you. Read the directions carefully, and ask your doctor or other care provider to review them with you.

## 2018-04-04 NOTE — PROGRESS NOTES
Office Visit-Follow up    Chief Complaint: Ruddy Browning is a 57 year old male who is being seen for   Chief Complaint   Patient presents with     RECHECK     f/u left knee lov 3-12-/18 inj given        History of Present Illness:   Location: Left medial knee  Rating of Pain: No pain  Pain Quality: No pain  Pain is better with: Rest  Pain is worse with: Nothing at this point.  Treatment so far consists of: Ice and rest and elevation.  Patient also has had a cortisone injection that was done on 2/7/2018 and another one on 3/12/2018.  He also had a Synvisc 1 injection on 2/26/2018..   Associated Features: Denies any problems or numbness or tingling.  Pain is Limiting: Nothing at this point.  Here to: Get a return to work note  Additional History: None, patient is very happy that he has recovered.    REVIEW OF SYSTEMS  General: negative for, night sweats, dizziness, fatigue  Resp: No shortness of breath and no cough  CV: negative for chest pain, syncope or near-syncope  GI: negative for nausea, vomiting and diarrhea  : negative for dysuria and hematuria  Musculoskeletal: as above  Neurologic: negative for syncope   Hematologic: negative for bleeding disorder    Physical Exam:  Vitals: /86  Pulse 77  Temp 97.8  F (36.6  C)  Wt 127.5 kg (281 lb)  BMI 37.07 kg/m2  BMI= Body mass index is 37.07 kg/(m^2).  Constitutional: healthy, alert and no acute distress   Psychiatric: mentation appears normal and affect normal/bright  NEURO: no focal deficits, CMS intact left lower extremity  RESP: Normal with easy respirations and no use of accessory muscles to breathe, no audible wheezing or retractions  CV: Calf soft and nontender to palpation, leg warm   SKIN: No erythema, rashes, excoriation, or breakdown. No evidence of infection.   MUSCULOSKELETAL:    INSPECTION of left knee: No gross deformities, erythema, edema, ecchymosis, atrophy or fasciculations.     PALPATION: No tenderness on palpation of the medial,  lateral, anterior and posterior portion of the knee. No specific joint line tenderness. No increased warmth.  No effusion.    ROM: Extension full, flexion to approximately 125 . All range of motion without catching, locking or pain.       STRENGTH: Firing quad against gravity and hamstring without any problems.  Ambulating without any problems able to get on and off the exam table without any problems.    SPECIAL TEST: Patient has a negative Lachman's negative drawer sign. Patient's knee is stable to varus and valgus stress at 30  of flexion. Patient has a negative Nichole's.   GAIT: non-antalgic  Lymph: no palpable lymph nodes      Diagnostic Modalities:  Recent Results (from the past 744 hour(s))   MR Knee Left w/o Contrast    Narrative    MR KNEE LEFT WITHOUT CONTRAST 3/6/2018 8:49 AM    HISTORY: Post-traumatic osteoarthritis of left knee. Knee pain without  specific injury.    TECHNIQUE: Axial and coronal T2 with fat suppression. Coronal T1.  Sagittal dual echo T2.    FINDINGS:   Medial Meniscus: Mild extrusion of the anterior horn. Prominent  tearing and deficiency of the body segment. This includes a displaced  meniscal fragment at the superomedial aspect of the body segment.  Extrusion of the body segment. Tearing and degeneration at the apex of  the posterior horn. There also appears to be a small horizontal tear  of the posterior horn, with superior extension.    Lateral Meniscus: No tear, displaced fragment, or extrusion.        Anterior Cruciate Ligament: Unremarkable. No sprain or tear  identified.     Posterior Cruciate Ligament: Unremarkable. No sprain or tear  identified.     Medial Collateral Ligament: No sprain or tear identified.    Lateral Collateral Ligament Complex, Popliteus Tendon: The iliotibial  band, fibular collateral ligament, biceps femoris tendon, and  popliteus tendon are intact.    Osseous and Cartilaginous Structures: Three compartment spurring.  There is a stress fracture or  insufficiency fracture of the medial  tibial plateau. The fracture line is seen on series 5 images 18-20 and  series 6 images 19-21. There is also a small subchondral insufficiency  fracture of the medial femoral condyle, seen on series 6 image 20.  There is prominent adjacent bone marrow edema along the medial femoral  condyle and medial tibial plateau regions. Grade II chondromalacia of  the lateral tibial plateau. Medial compartment chondromalacia,  including grade IV involvement on both sides of the joint. Patellar  chondromalacia including grade IV involvement medially. Trochlear  chondromalacia including grade IV involvement medially.    Extensor Mechanism: The quadriceps and patellar tendons are intact.  The medial and lateral patellar retinacula appear unremarkable.    Joint Space: Synovitis and mild-moderate joint effusion. I also  suspect minimal or developing lipoma arborescens. No definite loose  bodies appreciated.    Additional Findings: No Baker's cyst. No semimembranosus-tibial  collateral ligament or pes anserine bursitis. There is mild  nonspecific soft tissue edema.      Impression    IMPRESSION:  1. Medial meniscal tearing including a displaced fragment.  2. Stress fracture or insufficiency fracture of the medial tibial  plateau.  3. Mild subchondral insufficiency fracture of the medial femoral  condyle.  4. Three compartment chondromalacia.  5. Synovitis and mild-moderate effusion. There also appears to be at  least minimal/developing lipoma arborescens.    KATHY BRAY MD     We agree with the above reading.  Independent visualization of the images was performed.      Impression: 1.  Left knee bone contusion medial femoral condyle and medial tibial plateau, resolved.  2.  Medial meniscus tear, nonsymptomatic.    Plan:  All of the above pertinent physical exam and imaging modalities findings was reviewed with Ruddy and his wife.                                          CONSERVATIVE  CARE:    Patient Instructions:   1.  We are glad that your knee feels a lot better.  You have just been doing icing and elevating and it is working.  You also have decreased her activity.  2.  We get a cortisone injection on 3/12/2018 and a Synvisc 1 injection on 2/26/2018 as well as a cortisone injection on 2/7/2018.  3.  You are ready to go back to work, we wrote you this work note today to go back to work without restrictions.  4.  Follow up with Rachel Ayoub MD and/or Lisandro Yanez PA-C on an as needed basis.   Re-x-ray on return: No    BP Readings from Last 1 Encounters:   04/04/18 134/86       BP noted to be well controlled today in office.      Patient does not use Tobacco products.    Scribed by Lisandro Yanez PA-C on 4/4/2018 at 3:16 PM, based on Dr. Rachel Ayoub's statements to me.    This note was dictated with [employer].    JOSEPH Alston MD

## 2018-04-04 NOTE — PROGRESS NOTES
SUBJECTIVE:   Ruddy Browning is a 57 year old male who presents to clinic today for the following health issues:      Anxiety Follow-Up    Status since last visit: Improved     Other associated symptoms:None    Complicating factors:   Significant life event: No   Current substance abuse: None  Depression symptoms: No  SYDNEE-7 SCORE 3/12/2018   Total Score 5       SYDNEE-7    Amount of exercise or physical activity: None    Problems taking medications regularly: No    Medication side effects: none    Diet: regular (no restrictions)    Blood pressure follow-up  Patient has been doing well taking his medications  He has been off his hydrochlorothiazide since December because he had dehydration with acute kidney failure and low blood pressure.  He had IV hydration in the ED and was feeling much better after that his kidney function is returned to normal and his blood pressures remained stable just on lisinopril    Lipid follow-up  He continues to take his pravastatin without any problems.  He has no side effects from the medication.    PROBLEMS TO ADD ON...  Needs follow-up for his hypertension and lipids also.    Problem list and histories reviewed & adjusted, as indicated.  Additional history: as documented    Patient Active Problem List   Diagnosis     Plantar fascial fibromatosis     Mixed hyperlipidemia     Hereditary and idiopathic peripheral neuropathy     Essential hypertension, benign     ESOPHAGEAL REFLUX     Varicose veins of lower extremities with complications     Reyes's esophagus     HYPERLIPIDEMIA LDL GOAL <130     Morbid obesity (H)     Tear of medial cartilage or meniscus of Rt knee, current     Edema     Hypertension goal BP (blood pressure) < 140/90     Elevated cholesterol with elevated triglycerides     Benign essential hypertension     Migraine without status migrainosus, not intractable, unspecified migraine type     Contusion of bone     SYDNEE (generalized anxiety disorder)     Other insomnia      Past Surgical History:   Procedure Laterality Date     COLONOSCOPY  2013    Procedure: COLONOSCOPY;  Colonoscopy, Esophagogastroduodenoscopy with Single Biopsy;  Surgeon: Eddie Marshall MD;  Location: [facility]     ESOPHAGOSCOPY, GASTROSCOPY, DUODENOSCOPY (EGD), COMBINED  2013    Procedure: COMBINED ESOPHAGOSCOPY, GASTROSCOPY, DUODENOSCOPY (EGD), BIOPSY SINGLE OR MULTIPLE;;  Surgeon: Eddie Marshall MD;  Location: [facility]     ESOPHAGOSCOPY, GASTROSCOPY, DUODENOSCOPY (EGD), COMBINED N/A 2017    Procedure: COMBINED ESOPHAGOSCOPY, GASTROSCOPY, DUODENOSCOPY (EGD), BIOPSY SINGLE OR MULTIPLE;  Surgeon: Kodi Ng MD;  Location: [facility]     [facility] UGI ENDOSCOPY DIAG W BIOPSY  2008, 03/12/10     OPEN REDUCTION INTERNAL FIXATION WRIST Right 2016     REMOVE NAIL BED/FINGER TIP Left 2016     ROTATOR CUFF REPAIR RT/LT Right 3/2016       Social History   Substance Use Topics     Smoking status: Never Smoker     Smokeless tobacco: Never Used     Alcohol use 0.0 oz/week     0 Standard drinks or equivalent per week      Comment: rare     Family History   Problem Relation Age of Onset     HEART DISEASE Father      alcohol dependant,  at age 52     Lipids Father      CEREBROVASCULAR DISEASE Brother      had an aneurysm     Arthritis Sister      rheumatiod arthritis     Hypertension Father          Current Outpatient Prescriptions   Medication Sig Dispense Refill     sertraline (ZOLOFT) 50 MG tablet Take 1/2 tablet every morning for 2 wks then increase to a full tab every morning 90 tablet 1     lisinopril (PRINIVIL/ZESTRIL) 10 MG tablet Take 1 tablet (10 mg) by mouth daily 90 tablet 3     pravastatin (PRAVACHOL) 20 MG tablet Take 1 tablet (20 mg) by mouth daily 90 tablet 3     omeprazole (PRILOSEC) 40 MG capsule Take 1 capsule (40 mg) by mouth daily Take 30-60 minutes before a meal. 90 capsule 3     clonazePAM (KLONOPIN) 0.5 MG tablet Take 1-2 tablets (0.5-1 mg) by mouth nightly as needed for  anxiety 30 tablet 5     LORazepam (ATIVAN) 1 MG tablet Take 1 tablet (1 mg) by mouth every 8 hours as needed for anxiety 10 tablet 0     ibuprofen (ADVIL/MOTRIN) 600 MG tablet Take 1 tablet (600 mg) by mouth every 6 hours as needed for moderate pain 60 tablet 0     SUMAtriptan Succinate Refill (IMITREX) 6 MG/0.5ML SOCT INJECT 0.5ML FOR ONE DOSE. MAY REPEAT IN 1 HR, DO NOT EXCEED 2 SHOTS IN 24 HOURS 2 mL 2     SUMAtriptan (IMITREX) 100 MG tablet Take 1 tablet (100 mg) by mouth at onset of headache for migraine May repeat in 2 hours if headache not gone. Max dose is 200 mg in 24 hours 9 tablet 6     sucralfate (CARAFATE) 1 GM tablet Take 1 tablet (1 g) by mouth 2 times daily as needed 40 tablet 1     aspirin 81 MG tablet ONE DAILY       cetirizine (ZYRTEC) 10 MG tablet Take 1 tablet (10 mg) by mouth daily 90 tablet 3     Cholecalciferol (VITAMIN D3 PO) Take 1,000 Units by mouth daily       MULTI VITAMIN MENS OR TABS 1 tablet daily       [DISCONTINUED] sertraline (ZOLOFT) 50 MG tablet Take 1/2 tablet every morning for 2 wks then increase to a full tab every morning 60 tablet 1     [DISCONTINUED] lisinopril (PRINIVIL/ZESTRIL) 10 MG tablet TAKE ONE TABLET BY MOUTH ONCE DAILY 90 tablet 0     [DISCONTINUED] pravastatin (PRAVACHOL) 20 MG tablet TAKE ONE TABLET BY MOUTH ONCE DAILY 90 tablet 3     Allergies   Allergen Reactions     No Known Drug Allergies      Tramadol      Anxiety      Recent Labs   Lab Test  03/12/18   1617  02/24/18   0807  02/21/17   1415  01/17/17   1601  12/10/16   0804  08/29/16   1652   12/29/14   1821   LDL   --   109*   --    --   111*  Cannot estimate LDL when triglyceride exceeds 400 mg/dL  89   < >   --    HDL   --   41   --    --   35*  30*   < >   --    TRIG   --   125   --    --   169*  592*   < >   --    ALT   --   35  42  39   --   42   < >   --    CR   --   0.90  2.07*  1.66*   --   0.98   < >  1.01   GFRESTIMATED   --   87  33*  43*   --   80   < >  77   GFRESTBLACK   --   >90  40*  52*    --   >90   GFR Calc     < >  >90   GFR Calc     POTASSIUM   --   4.3  3.7  4.0   --   4.0   < >  3.8   TSH  0.82   --    --    --    --    --    --   2.97    < > = values in this interval not displayed.      BP Readings from Last 3 Encounters:   04/04/18 134/86   04/04/18 136/86   03/12/18 (!) 132/92    Wt Readings from Last 3 Encounters:   04/04/18 281 lb (127.5 kg)   04/04/18 281 lb (127.5 kg)   03/12/18 280 lb (127 kg)                  Labs reviewed in EPIC    Reviewed and updated as needed this visit by clinical staff  Tobacco  Allergies  Meds       Reviewed and updated as needed this visit by Provider         ROS:  Constitutional, HEENT, cardiovascular, pulmonary, gi and gu systems are negative, except as otherwise noted.    OBJECTIVE:     /86  Pulse 76  Temp 97.9  F (36.6  C) (Temporal)  Resp 16  Wt 281 lb (127.5 kg)  SpO2 100%  BMI 37.07 kg/m2  Body mass index is 37.07 kg/(m^2).  GENERAL: healthy, alert and no distress  NECK: no adenopathy, no asymmetry, masses, or scars and thyroid normal to palpation  RESP: lungs clear to auscultation - no rales, rhonchi or wheezes  CV: regular rate and rhythm, normal S1 S2, no S3 or S4, no murmur, click or rub, no peripheral edema and peripheral pulses strong  MS: no gross musculoskeletal defects noted, no edema    Diagnostic Test Results:  none     ASSESSMENT/PLAN:   (F41.1) SYDNEE (generalized anxiety disorder)  Comment: His anxiety is much better on the sertraline.  He has started on the 50 mg tablet but had some side effects when down to half a tablet initially for a week and then back up to a 1 tablet daily just last week and is doing much better.  He does not feel claustrophobic at all anymore is 6 something magic happened at the 2 week ketan and he really is tolerating being around people being in more confined spaces.  His wife feels like he is doing remarkably better also.  Plan: sertraline (ZOLOFT) 50 MG tablet        He  "is agreeable to stay on the medication for a minimum of 6 months and will see me at that time to see if we can go down to half a tablet or 25 mg.  He would eventually like to get off of that if able.    (I10) Essential hypertension, benign  Comment: His blood pressures been stable just on the lisinopril and off of hydrochlorothiazide  Plan: lisinopril (PRINIVIL/ZESTRIL) 10 MG tablet        We will continue his lisinopril as ordered.  His labs are up-to-date.  Will follow up in 6 months.    (E78.5) Hyperlipidemia LDL goal <130  Comment: Tolerating medication and needs a refill labs are up-to-date.  Plan: pravastatin (PRAVACHOL) 20 MG tablet        Refill sent.    (K22.70) Reyes's esophagus without dysplasia  Comment: He has a history of Reyes's esophagus without dysplasia and is doing well on his omeprazole 40 mg daily.  Plan: omeprazole (PRILOSEC) 40 MG capsule        He is due for refills so sent that to the pharmacy for him.  He had his last upper endoscopy done in January of 2017 and will need a repeat of this in January 2024 in 3 years from the last procedure.    Tobacco Cessation:   reports that he has never smoked. He has never used smokeless tobacco.      BMI:   Estimated body mass index is 37.07 kg/(m^2) as calculated from the following:    Height as of 3/12/18: 6' 1\" (1.854 m).    Weight as of this encounter: 281 lb (127.5 kg).   Weight management plan: Discussed healthy diet and exercise guidelines and patient will follow up in 6 months in clinic to re-evaluate.    Electronically signed by:  Maikol Allen M.D.  4/4/2018    "

## 2018-04-04 NOTE — LETTER
[address] [address]  Phone: [phone]  Fax: [phone]    April 4, 2018        Ruddy Browning  [address] [address] [place] [address]          To whom it may concern:    RE: Ruddy Browning    Patient was seen and treated today at our clinic.  Patient may return to work with no restrictions.     Please contact me for questions or concerns.      Sincerely,        Lisandro Yanez PA-C

## 2018-04-04 NOTE — MR AVS SNAPSHOT
After Visit Summary   4/4/2018    Ruddy Browning    MRN: [id number]           Patient Information     Date Of Birth          1960        Visit Information        Provider Department      4/4/2018 2:20 PM Rachel Ayoub MD [facility]        Today's Diagnoses     Contusion of bone, medial femoral condyle and medial tibial plateau    -  1    Tear of medial meniscus of left knee, current, unspecified tear type, subsequent encounter          Care Instructions    Encounter Diagnoses   Name Primary?     Contusion of bone, medial femoral condyle and medial tibial plateau Yes     Tear of medial meniscus of left knee, current, unspecified tear type, subsequent encounter      Rest, ice and elevate above heart level as needed for pain control  1.  We are glad that your knee feels a lot better.  You have just been doing icing and elevating and it is working.  You also have decreased her activity.  2.  We get a cortisone injection on 3/12/2018 and a Synvisc 1 injection on 2/26/2018 as well as a cortisone injection on 2/7/2018.  3.  You are ready to go back to work, we wrote you this work note today to go back to work without restrictions.  4.  Follow up with Rachel Ayoub MD and/or Lisandro Yanez PA-C on an as needed basis.   [employer] and [employer] may offer reliable information regarding your diagnosis and treatment plan.    THANK YOU for coming in today. If you receive a survey via [employer] or mail please let us know if there was anything you especially appreciated today or if there is any way we can improve our clinic. We appreciate your input.    GENERAL INFORMATION:  Our hours are:  Monday :     Clinic 7:30 AM-430 PM ([facility])  Tuesday:      Operating Room All Day ([facility])  Wednesday: Clinic 7:30 AM - 11:15 AM ([facility])             Clinic 1:00 PM - 4:00PM ([facility])  Thursday:      Administrative Day  Friday:          Clinic 7:30 AM - 11:15 AM ([facility])            Clinic 1:00 PM - 4:00 PM ([facility])      Bone and Joint Service Line for any issues or concerns: [phone]      We are not in the office Thursdays. Therefore non- urgent calls and medical messages received on Thursday will be addressed when we are back in the office on Wednesday. Urgent matters will be reviewed and addressed by one of our partners in the office as needed.    If lab work was done today as part of your evaluation you will generally be contacted via [employer], mail, or phone with the results within 1-5 days. If there is an alarming result we will contact you by phone. Lab results come back at varying times, I generally wait until all labs are resulted before making comments on results. Please note labs are automatically released to [employer] (if you have signed up for it) once available-at times you may see these prior to my having a chance to review them as well.    If you need refills please contact your pharmacist. They will send a refill request to me to review. Please allow 3 business days for us to process all refill requests. All narcotic refills should be handled in the clinic at the time of your visit.           Follow-ups after your visit        Follow-up notes from your care team     Return if symptoms worsen or fail to improve.      Your next 10 appointments already scheduled     Apr 09, 2018  9:00 AM CDT   Return Visit with Rachel Ayoub MD   [facility] ([address] [address]   [phone]            Sep 24, 2018  3:40 PM CDT   Office Visit with Maikol Allen MD   [facility] ([address] [address]   [phone]           Bring a current list of meds and any records pertaining to this visit. For Physicals,  "please bring immunization records and any forms needing to be filled out. Please arrive 10 minutes early to complete paperwork.              Who to contact     If you have questions or need follow up information about today's clinic visit or your schedule please contact [facility] directly at [phone].  Normal or non-critical lab and imaging results will be communicated to you by MyChart, letter or phone within 4 business days after the clinic has received the results. If you do not hear from us within 7 days, please contact the clinic through MyChart or phone. If you have a critical or abnormal lab result, we will notify you by phone as soon as possible.  Submit refill requests through [employer] or call your pharmacy and they will forward the refill request to us. Please allow 3 business days for your refill to be completed.          Additional Information About Your Visit        MyChart Information     [employer] lets you send messages to your doctor, view your test results, renew your prescriptions, schedule appointments and more. To sign up, go to [email or web address] . Click on \"Log in\" on the left side of the screen, which will take you to the Welcome page. Then click on \"Sign up Now\" on the right side of the page.     You will be asked to enter the access code listed below, as well as some personal information. Please follow the directions to create your username and password.     Your access code is: [email or web address]  Expires: 2018  5:49 PM     Your access code will  in 90 days. If you need help or a new code, please call your [facility] or [phone].        Care EveryWhere ID     This is your Care EveryWhere ID. This could be used by other organizations to access your [facility] medical records  [id number]        Your Vitals Were     Pulse Temperature BMI (Body Mass Index)             77 97.8  F (36.6  C) 37.07 kg/m2          Blood Pressure from Last 3 Encounters:   18 " 134/86   04/04/18 136/86   03/12/18 (!) 132/92    Weight from Last 3 Encounters:   04/04/18 127.5 kg (281 lb)   04/04/18 127.5 kg (281 lb)   03/12/18 127 kg (280 lb)              Today, you had the following     No orders found for display         Today's Medication Changes          These changes are accurate as of 4/4/18  3:48 PM.  If you have any questions, ask your nurse or doctor.               These medicines have changed or have updated prescriptions.        Dose/Directions    lisinopril 10 MG tablet   Commonly known as:  PRINIVIL/ZESTRIL   This may have changed:  See the new instructions.   Used for:  Essential hypertension, benign   Changed by:  Maikol Allen MD        Dose:  10 mg   Take 1 tablet (10 mg) by mouth daily   Quantity:  90 tablet   Refills:  3       pravastatin 20 MG tablet   Commonly known as:  PRAVACHOL   This may have changed:  See the new instructions.   Used for:  Hyperlipidemia LDL goal <130   Changed by:  Maikol Allen MD        Dose:  20 mg   Take 1 tablet (20 mg) by mouth daily   Quantity:  90 tablet   Refills:  3            Where to get your medicines      These medications were sent to [address]  [address], [place] [address]     Phone:  [phone]     lisinopril 10 MG tablet    omeprazole 40 MG capsule    pravastatin 20 MG tablet    sertraline 50 MG tablet                Primary Care Provider Office Phone # Fax #    Maikol Allen [phone]       [address] [place] [address]        Equal Access to Services     [facility]: Hadii aad ku hadasho Soomaali, waaxda luqadaha, qaybta kaalmada adeegyada, dara weir haychucky chadwick . So [facility] [phone].    ATENCIÓN: Si habla español, tiene a ross disposición servicios gratuitos de asistencia lingüística. Llame al [phone].    We comply with applicable federal civil rights laws and [place] laws. We do not discriminate on the basis  of race, color, national origin, age, disability, sex, sexual orientation, or gender identity.            Thank you!     Thank you for choosing [facility]  for your care. Our goal is always to provide you with excellent care. Hearing back from our patients is one way we can continue to improve our services. Please take a few minutes to complete the written survey that you may receive in the mail after your visit with us. Thank you!             Your Updated Medication List - Protect others around you: Learn how to safely use, store and throw away your medicines at www.disposemymeds.org.          This list is accurate as of 4/4/18  3:48 PM.  Always use your most recent med list.                   Brand Name Dispense Instructions for use Diagnosis    aspirin 81 MG tablet      ONE DAILY    Essential hypertension, benign, Mixed hyperlipidemia       cetirizine 10 MG tablet    zyrTEC    90 tablet    Take 1 tablet (10 mg) by mouth daily    Seasonal allergies       clonazePAM 0.5 MG tablet    klonoPIN    30 tablet    Take 1-2 tablets (0.5-1 mg) by mouth nightly as needed for anxiety    Other insomnia       ibuprofen 600 MG tablet    ADVIL/MOTRIN    60 tablet    Take 1 tablet (600 mg) by mouth every 6 hours as needed for moderate pain    Left knee pain, unspecified chronicity       lisinopril 10 MG tablet    PRINIVIL/ZESTRIL    90 tablet    Take 1 tablet (10 mg) by mouth daily    Essential hypertension, benign       LORazepam 1 MG tablet    ATIVAN    10 tablet    Take 1 tablet (1 mg) by mouth every 8 hours as needed for anxiety    SYDNEE (generalized anxiety disorder)       MULTI vitamin  MENS Tabs      1 tablet daily        omeprazole 40 MG capsule    priLOSEC    90 capsule    Take 1 capsule (40 mg) by mouth daily Take 30-60 minutes before a meal.    Reyes's esophagus without dysplasia       pravastatin 20 MG tablet    PRAVACHOL    90 tablet    Take 1 tablet (20 mg) by mouth daily    Hyperlipidemia LDL goal  <130       sertraline 50 MG tablet    ZOLOFT    90 tablet    Take 1/2 tablet every morning for 2 wks then increase to a full tab every morning    SYDNEE (generalized anxiety disorder)       sucralfate 1 GM tablet    CARAFATE    40 tablet    Take 1 tablet (1 g) by mouth 2 times daily as needed    Gastroesophageal reflux disease with esophagitis, Arthralgia, unspecified joint, Reyes's esophagus without dysplasia       * SUMAtriptan 100 MG tablet    IMITREX    9 tablet    Take 1 tablet (100 mg) by mouth at onset of headache for migraine May repeat in 2 hours if headache not gone. Max dose is 200 mg in 24 hours    Migraine without status migrainosus, not intractable, unspecified migraine type       * SUMAtriptan Succinate Refill 6 MG/0.5ML Soct    IMITREX    2 mL    INJECT 0.5ML FOR ONE DOSE. MAY REPEAT IN 1 HR, DO NOT EXCEED 2 SHOTS IN 24 HOURS    Migraine headache       VITAMIN D3 PO      Take 1,000 Units by mouth daily        * Notice:  This list has 2 medication(s) that are the same as other medications prescribed for you. Read the directions carefully, and ask your doctor or other care provider to review them with you.

## 2018-04-04 NOTE — LETTER
4/4/2018         RE: Ruddy Browning  [address] [address] [place] [address]        Dear Colleague,    Thank you for referring your patient, Ruddy Bronwing, to the [facility]. Please see a copy of my visit note below.    Office Visit-Follow up    Chief Complaint: Ruddy Browning is a 57 year old male who is being seen for   Chief Complaint   Patient presents with     RECHECK     f/u left knee lov 3-12-/18 inj given        History of Present Illness:   Location: Left medial knee  Rating of Pain: No pain  Pain Quality: No pain  Pain is better with: Rest  Pain is worse with: Nothing at this point.  Treatment so far consists of: Ice and rest and elevation.  Patient also has had a cortisone injection that was done on 2/7/2018 and another one on 3/12/2018.  He also had a Synvisc 1 injection on 2/26/2018..   Associated Features: Denies any problems or numbness or tingling.  Pain is Limiting: Nothing at this point.  Here to: Get a return to work note  Additional History: None, patient is very happy that he has recovered.    REVIEW OF SYSTEMS  General: negative for, night sweats, dizziness, fatigue  Resp: No shortness of breath and no cough  CV: negative for chest pain, syncope or near-syncope  GI: negative for nausea, vomiting and diarrhea  : negative for dysuria and hematuria  Musculoskeletal: as above  Neurologic: negative for syncope   Hematologic: negative for bleeding disorder    Physical Exam:  Vitals: /86  Pulse 77  Temp 97.8  F (36.6  C)  Wt 127.5 kg (281 lb)  BMI 37.07 kg/m2  BMI= Body mass index is 37.07 kg/(m^2).  Constitutional: healthy, alert and no acute distress   Psychiatric: mentation appears normal and affect normal/bright  NEURO: no focal deficits, CMS intact left lower extremity  RESP: Normal with easy respirations and no use of accessory muscles to breathe, no audible wheezing or retractions  CV: Calf soft and nontender to palpation, leg warm   SKIN: No  erythema, rashes, excoriation, or breakdown. No evidence of infection.   MUSCULOSKELETAL:    INSPECTION of left knee: No gross deformities, erythema, edema, ecchymosis, atrophy or fasciculations.     PALPATION: No tenderness on palpation of the medial, lateral, anterior and posterior portion of the knee. No specific joint line tenderness. No increased warmth.  No effusion.    ROM: Extension full, flexion to approximately 125 . All range of motion without catching, locking or pain.       STRENGTH: Firing quad against gravity and hamstring without any problems.  Ambulating without any problems able to get on and off the exam table without any problems.    SPECIAL TEST: Patient has a negative Lachman's negative drawer sign. Patient's knee is stable to varus and valgus stress at 30  of flexion. Patient has a negative Nichole's.   GAIT: non-antalgic  Lymph: no palpable lymph nodes      Diagnostic Modalities:  Recent Results (from the past 744 hour(s))   MR Knee Left w/o Contrast    Narrative    MR KNEE LEFT WITHOUT CONTRAST 3/6/2018 8:49 AM    HISTORY: Post-traumatic osteoarthritis of left knee. Knee pain without  specific injury.    TECHNIQUE: Axial and coronal T2 with fat suppression. Coronal T1.  Sagittal dual echo T2.    FINDINGS:   Medial Meniscus: Mild extrusion of the anterior horn. Prominent  tearing and deficiency of the body segment. This includes a displaced  meniscal fragment at the superomedial aspect of the body segment.  Extrusion of the body segment. Tearing and degeneration at the apex of  the posterior horn. There also appears to be a small horizontal tear  of the posterior horn, with superior extension.    Lateral Meniscus: No tear, displaced fragment, or extrusion.        Anterior Cruciate Ligament: Unremarkable. No sprain or tear  identified.     Posterior Cruciate Ligament: Unremarkable. No sprain or tear  identified.     Medial Collateral Ligament: No sprain or tear identified.    Lateral  Collateral Ligament Complex, Popliteus Tendon: The iliotibial  band, fibular collateral ligament, biceps femoris tendon, and  popliteus tendon are intact.    Osseous and Cartilaginous Structures: Three compartment spurring.  There is a stress fracture or insufficiency fracture of the medial  tibial plateau. The fracture line is seen on series 5 images 18-20 and  series 6 images 19-21. There is also a small subchondral insufficiency  fracture of the medial femoral condyle, seen on series 6 image 20.  There is prominent adjacent bone marrow edema along the medial femoral  condyle and medial tibial plateau regions. Grade II chondromalacia of  the lateral tibial plateau. Medial compartment chondromalacia,  including grade IV involvement on both sides of the joint. Patellar  chondromalacia including grade IV involvement medially. Trochlear  chondromalacia including grade IV involvement medially.    Extensor Mechanism: The quadriceps and patellar tendons are intact.  The medial and lateral patellar retinacula appear unremarkable.    Joint Space: Synovitis and mild-moderate joint effusion. I also  suspect minimal or developing lipoma arborescens. No definite loose  bodies appreciated.    Additional Findings: No Baker's cyst. No semimembranosus-tibial  collateral ligament or pes anserine bursitis. There is mild  nonspecific soft tissue edema.      Impression    IMPRESSION:  1. Medial meniscal tearing including a displaced fragment.  2. Stress fracture or insufficiency fracture of the medial tibial  plateau.  3. Mild subchondral insufficiency fracture of the medial femoral  condyle.  4. Three compartment chondromalacia.  5. Synovitis and mild-moderate effusion. There also appears to be at  least minimal/developing lipoma arborescens.    KATHY BRAY MD     We agree with the above reading.  Independent visualization of the images was performed.      Impression: 1.  Left knee bone contusion medial femoral condyle and medial  tibial plateau, resolved.  2.  Medial meniscus tear, nonsymptomatic.    Plan:  All of the above pertinent physical exam and imaging modalities findings was reviewed with Ruddy and his wife.                                          CONSERVATIVE CARE:    Patient Instructions:   1.  We are glad that your knee feels a lot better.  You have just been doing icing and elevating and it is working.  You also have decreased her activity.  2.  We get a cortisone injection on 3/12/2018 and a Synvisc 1 injection on 2/26/2018 as well as a cortisone injection on 2/7/2018.  3.  You are ready to go back to work, we wrote you this work note today to go back to work without restrictions.  4.  Follow up with Rachel Ayoub MD and/or Lisandro Yanez PA-C on an as needed basis.   Re-x-ray on return: No    BP Readings from Last 1 Encounters:   04/04/18 134/86       BP noted to be well controlled today in office.      Patient does not use Tobacco products.    Scribed by Lisandro Yanez PA-C on 4/4/2018 at 3:16 PM, based on Dr. Rachel Ayoub's statements to me.    This note was dictated with [employer].    JOSEPH Alston MD          Again, thank you for allowing me to participate in the care of your patient.        Sincerely,        Rachel Ayoub MD

## 2018-04-04 NOTE — PATIENT INSTRUCTIONS
Encounter Diagnoses   Name Primary?     Contusion of bone, medial femoral condyle and medial tibial plateau Yes     Tear of medial meniscus of left knee, current, unspecified tear type, subsequent encounter      Rest, ice and elevate above heart level as needed for pain control  1.  We are glad that your knee feels a lot better.  You have just been doing icing and elevating and it is working.  You also have decreased her activity.  2.  We get a cortisone injection on 3/12/2018 and a Synvisc 1 injection on 2/26/2018 as well as a cortisone injection on 2/7/2018.  3.  You are ready to go back to work, we wrote you this work note today to go back to work without restrictions.  4.  Follow up with Rachel Ayoub MD and/or Lisandro Yanez PA-C on an as needed basis.   [employer] and [employer] may offer reliable information regarding your diagnosis and treatment plan.    THANK YOU for coming in today. If you receive a survey via [employer] or mail please let us know if there was anything you especially appreciated today or if there is any way we can improve our clinic. We appreciate your input.    GENERAL INFORMATION:  Our hours are:  Monday :     Clinic 7:30 AM-430 PM ([facility])  Tuesday:      Operating Room All Day ([facility])  Wednesday: Clinic 7:30 AM - 11:15 AM ([facility])             Clinic 1:00 PM - 4:00PM ([facility])  Thursday:     Administrative Day  Friday:          Clinic 7:30 AM - 11:15 AM ([facility])            Clinic 1:00 PM - 4:00 PM ([facility])      Bone and Joint Service Line for any issues or concerns: [phone]      We are not in the office Thursdays. Therefore non- urgent calls and medical messages received on Thursday will be addressed when we are back in the office on Wednesday. Urgent matters will be reviewed and addressed by one of our partners in the office as  needed.    If lab work was done today as part of your evaluation you will generally be contacted via [email or web address] mail, or phone with the results within 1-5 days. If there is an alarming result we will contact you by phone. Lab results come back at varying times, I generally wait until all labs are resulted before making comments on results. Please note labs are automatically released to [email or web address] (if you have signed up for it) once available-at times you may see these prior to my having a chance to review them as well.    If you need refills please contact your pharmacist. They will send a refill request to me to review. Please allow 3 business days for us to process all refill requests. All narcotic refills should be handled in the clinic at the time of your visit.

## 2018-04-04 NOTE — NURSING NOTE
"Chief Complaint   Patient presents with     Anxiety     recheck and he is doing alot better       Initial /86  Pulse 76  Temp 97.9  F (36.6  C) (Temporal)  Resp 16  Wt 281 lb (127.5 kg)  SpO2 100%  BMI 37.07 kg/m2 Estimated body mass index is 37.07 kg/(m^2) as calculated from the following:    Height as of 3/12/18: 6' 1\" (1.854 m).    Weight as of this encounter: 281 lb (127.5 kg).  Medication Reconciliation: complete    "

## 2018-04-04 NOTE — NURSING NOTE
"Chief Complaint   Patient presents with     RECHECK     f/u left knee lov 3-12-/18 inj given        Initial /86  Pulse 77  Temp 97.8  F (36.6  C)  Wt 127.5 kg (281 lb)  BMI 37.07 kg/m2 Estimated body mass index is 37.07 kg/(m^2) as calculated from the following:    Height as of 3/12/18: 1.854 m (6' 1\").    Weight as of this encounter: 127.5 kg (281 lb).  Medication Reconciliation: complete    BP completed using cuff size: nelson Lyle MA      "

## 2018-04-05 ASSESSMENT — PATIENT HEALTH QUESTIONNAIRE - PHQ9: SUM OF ALL RESPONSES TO PHQ QUESTIONS 1-9: 0

## 2018-04-05 ASSESSMENT — ANXIETY QUESTIONNAIRES: GAD7 TOTAL SCORE: 5

## 2018-08-02 ENCOUNTER — OFFICE VISIT (OUTPATIENT)
Dept: URGENT CARE | Facility: RETAIL CLINIC | Age: 58
End: 2018-08-02
Payer: COMMERCIAL

## 2018-08-02 VITALS
TEMPERATURE: 98.6 F | SYSTOLIC BLOOD PRESSURE: 156 MMHG | DIASTOLIC BLOOD PRESSURE: 100 MMHG | HEART RATE: 79 BPM | OXYGEN SATURATION: 95 %

## 2018-08-02 DIAGNOSIS — G44.209 ACUTE NON INTRACTABLE TENSION-TYPE HEADACHE: ICD-10-CM

## 2018-08-02 DIAGNOSIS — R21 RASH AND NONSPECIFIC SKIN ERUPTION: Primary | ICD-10-CM

## 2018-08-02 DIAGNOSIS — A69.20 LYME DISEASE: ICD-10-CM

## 2018-08-02 PROCEDURE — 99213 OFFICE O/P EST LOW 20 MIN: CPT | Performed by: FAMILY MEDICINE

## 2018-08-02 RX ORDER — DOXYCYCLINE 100 MG/1
100 CAPSULE ORAL 2 TIMES DAILY
Qty: 28 CAPSULE | Refills: 0 | Status: SHIPPED | OUTPATIENT
Start: 2018-08-02 | End: 2019-04-01

## 2018-08-02 NOTE — MR AVS SNAPSHOT
"              After Visit Summary   2018    Ruddy Browning    MRN: [id number]           Patient Information     Date Of Birth          1960        Visit Information        Provider Department      2018 7:00 PM Kodi Pretty MD [facility]        Today's Diagnoses     Rash and nonspecific skin eruption    -  1    Acute non intractable tension-type headache        Lyme disease           Follow-ups after your visit        Your next 10 appointments already scheduled     Sep 24, 2018  3:40 PM CDT   Office Visit with Maikol Allen MD   [facility] ([facility]    [address] [place] [address] [id number] [phone]           Bring a current list of meds and any records pertaining to this visit. For Physicals, please bring immunization records and any forms needing to be filled out. Please arrive 10 minutes early to complete paperwork.              Who to contact     You can reach your care team any time of the day by calling [phone].  Notification of test results:  If you have an abnormal lab result, we will notify you by phone as soon as possible.         Additional Information About Your Visit        MyChart Information     Curiously lets you send messages to your doctor, view your test results, renew your prescriptions, schedule appointments and more. To sign up, go to [email or web address] . Click on \"Log in\" on the left side of the screen, which will take you to the Welcome page. Then click on \"Sign up Now\" on the right side of the page.     You will be asked to enter the access code listed below, as well as some personal information. Please follow the directions to create your username and password.     Your access code is: [email or web address]  Expires: 10/31/2018  7:15 PM     Your access code will  in 90 days. If you need help or a new code, please call your [facility] or [phone].        Care EveryWhere ID     This is " your Care EveryWhere ID. This could be used by other organizations to access your [facility] medical records  [id number]        Your Vitals Were     Pulse Temperature Pulse Oximetry             79 98.6  F (37  C) (Oral) 95%          Blood Pressure from Last 3 Encounters:   08/02/18 (!) 156/100   04/04/18 134/86   04/04/18 136/86    Weight from Last 3 Encounters:   04/04/18 281 lb (127.5 kg)   04/04/18 281 lb (127.5 kg)   03/12/18 280 lb (127 kg)              Today, you had the following     No orders found for display         Today's Medication Changes          These changes are accurate as of 8/2/18  7:16 PM.  If you have any questions, ask your nurse or doctor.               Start taking these medicines.        Dose/Directions    doxycycline 100 MG capsule   Commonly known as:  VIBRAMYCIN   Used for:  Lyme disease   Started by:  Kodi Pretyt MD        Dose:  100 mg   Take 1 capsule (100 mg) by mouth 2 times daily   Quantity:  28 capsule   Refills:  0            Where to get your medicines      These medications were sent to [address]  [address] [facility] [address]     Phone:  [phone]     doxycycline 100 MG capsule                Primary Care Provider Office Phone # Fax #    Maikol Allen [phone] [address] [facility]   [facility] [address]        Equal Access to Services     TYREE [facility] Hospital[facility] AH: Hadii liset chauo Sostvee, waaxda luqadaha, qaybta kaalmada adeegyada, dara chadwick . So [facility] [phone].    ATENCIÓN: Si habla español, tiene a ross disposición servicios gratuitos de asistencia lingüística. Llame al [phone].    We comply with applicable federal civil rights laws and [place] laws. We do not discriminate on the basis of race, color, national origin, age, disability, sex, sexual orientation, or gender identity.            Thank you!     Thank you for choosing [facility]  for  your care. Our goal is always to provide you with excellent care. Hearing back from our patients is one way we can continue to improve our services. Please take a few minutes to complete the written survey that you may receive in the mail after your visit with us. Thank you!             Your Updated Medication List - Protect others around you: Learn how to safely use, store and throw away your medicines at www.disposemymeds.org.          This list is accurate as of 8/2/18  7:16 PM.  Always use your most recent med list.                   Brand Name Dispense Instructions for use Diagnosis    aspirin 81 MG tablet      ONE DAILY    Essential hypertension, benign, Mixed hyperlipidemia       cetirizine 10 MG tablet    zyrTEC    90 tablet    Take 1 tablet (10 mg) by mouth daily    Seasonal allergies       clonazePAM 0.5 MG tablet    klonoPIN    30 tablet    Take 1-2 tablets (0.5-1 mg) by mouth nightly as needed for anxiety    Other insomnia       doxycycline 100 MG capsule    VIBRAMYCIN    28 capsule    Take 1 capsule (100 mg) by mouth 2 times daily    Lyme disease       ibuprofen 600 MG tablet    ADVIL/MOTRIN    60 tablet    Take 1 tablet (600 mg) by mouth every 6 hours as needed for moderate pain    Left knee pain, unspecified chronicity       lisinopril 10 MG tablet    PRINIVIL/ZESTRIL    90 tablet    Take 1 tablet (10 mg) by mouth daily    Essential hypertension, benign       LORazepam 1 MG tablet    ATIVAN    10 tablet    Take 1 tablet (1 mg) by mouth every 8 hours as needed for anxiety    SYDNEE (generalized anxiety disorder)       MULTI vitamin  MENS Tabs      1 tablet daily        omeprazole 40 MG capsule    priLOSEC    90 capsule    Take 1 capsule (40 mg) by mouth daily Take 30-60 minutes before a meal.    Reyes's esophagus without dysplasia       pravastatin 20 MG tablet    PRAVACHOL    90 tablet    Take 1 tablet (20 mg) by mouth daily    Hyperlipidemia LDL goal <130       sertraline 50 MG tablet    ZOLOFT    90  tablet    Take 1/2 tablet every morning for 2 wks then increase to a full tab every morning    SYDNEE (generalized anxiety disorder)       sucralfate 1 GM tablet    CARAFATE    40 tablet    Take 1 tablet (1 g) by mouth 2 times daily as needed    Gastroesophageal reflux disease with esophagitis, Arthralgia, unspecified joint, Reyes's esophagus without dysplasia       * SUMAtriptan 100 MG tablet    IMITREX    9 tablet    Take 1 tablet (100 mg) by mouth at onset of headache for migraine May repeat in 2 hours if headache not gone. Max dose is 200 mg in 24 hours    Migraine without status migrainosus, not intractable, unspecified migraine type       * SUMAtriptan Succinate Refill 6 MG/0.5ML Soct    IMITREX    2 mL    INJECT 0.5ML FOR ONE DOSE. MAY REPEAT IN 1 HR, DO NOT EXCEED 2 SHOTS IN 24 HOURS    Migraine headache       VITAMIN D3 PO      Take 1,000 Units by mouth daily        * Notice:  This list has 2 medication(s) that are the same as other medications prescribed for you. Read the directions carefully, and ask your doctor or other care provider to review them with you.

## 2018-08-03 NOTE — PROGRESS NOTES
SUBJECTIVE:   Ruddy Browning is a 57 year old male presenting with a chief complaint of headache and body aches.  Onset of symptoms was 3 day(s) ago.  Course of illness is worsening.    Severity moderate  Current and Associated symptoms: neck pain.  Bug bite on back of neck  Treatment measures tried include Tylenol/Ibuprofen.  Predisposing factors include possible tick exposure.    Past Medical History:   Diagnosis Date     Arthritis      Reyes's esophagus with esophagitis 2008     Esophageal reflux 11/27/2006     Gastro-oesophageal reflux disease      Migraine      Morbid obesity (H) 1/22/2009     Obesity, unspecified      Pure hypercholesterolemia      SPRAIN SHOULDER/ARM NOS 12/28/2005     Tear of medial cartilage or meniscus of Rt knee, current 10/17/2013     Unspecified essential hypertension      Current Outpatient Prescriptions   Medication Sig Dispense Refill     aspirin 81 MG tablet ONE DAILY       cetirizine (ZYRTEC) 10 MG tablet Take 1 tablet (10 mg) by mouth daily 90 tablet 3     Cholecalciferol (VITAMIN D3 PO) Take 1,000 Units by mouth daily       clonazePAM (KLONOPIN) 0.5 MG tablet Take 1-2 tablets (0.5-1 mg) by mouth nightly as needed for anxiety 30 tablet 5     doxycycline (VIBRAMYCIN) 100 MG capsule Take 1 capsule (100 mg) by mouth 2 times daily 28 capsule 0     ibuprofen (ADVIL/MOTRIN) 600 MG tablet Take 1 tablet (600 mg) by mouth every 6 hours as needed for moderate pain 60 tablet 0     lisinopril (PRINIVIL/ZESTRIL) 10 MG tablet Take 1 tablet (10 mg) by mouth daily 90 tablet 3     LORazepam (ATIVAN) 1 MG tablet Take 1 tablet (1 mg) by mouth every 8 hours as needed for anxiety 10 tablet 0     MULTI VITAMIN MENS OR TABS 1 tablet daily       omeprazole (PRILOSEC) 40 MG capsule Take 1 capsule (40 mg) by mouth daily Take 30-60 minutes before a meal. 90 capsule 3     pravastatin (PRAVACHOL) 20 MG tablet Take 1 tablet (20 mg) by mouth daily 90 tablet 3     sertraline (ZOLOFT) 50 MG tablet Take 1/2  tablet every morning for 2 wks then increase to a full tab every morning 90 tablet 1     sucralfate (CARAFATE) 1 GM tablet Take 1 tablet (1 g) by mouth 2 times daily as needed 40 tablet 1     SUMAtriptan (IMITREX) 100 MG tablet Take 1 tablet (100 mg) by mouth at onset of headache for migraine May repeat in 2 hours if headache not gone. Max dose is 200 mg in 24 hours 9 tablet 6     SUMAtriptan Succinate Refill (IMITREX) 6 MG/0.5ML SOCT INJECT 0.5ML FOR ONE DOSE. MAY REPEAT IN 1 HR, DO NOT EXCEED 2 SHOTS IN 24 HOURS 2 mL 2     Social History   Substance Use Topics     Smoking status: Never Smoker     Smokeless tobacco: Never Used     Alcohol use 0.0 oz/week     0 Standard drinks or equivalent per week      Comment: rare       ROS:  Review of systems negative except as stated above.    OBJECTIVE:  BP (!) 156/100  Pulse 79  Temp 98.6  F (37  C) (Oral)  SpO2 95%  GENERAL APPEARANCE: mild distress and cooperative  EYES: EOMI,  PERRL, conjunctiva clear  HENT: ear canals and TM's normal.  Nose and mouth without ulcers, erythema or lesions  NECK: some tenderness lt neck.  Bug bite lt posterior neck  RESP: lungs clear to auscultation - no rales, rhonchi or wheezes  CV: regular rates and rhythm, normal S1 S2, no murmur noted  ABDOMEN:  soft, nontender, no HSM or masses and bowel sounds normal  NEURO: Normal strength and tone, sensory exam grossly normal,  normal speech and mentation  SKIN: bug bite    ASSESSMENT:  Viral syndrome Lymes possible    PLAN:  Tylenol, Ibuprofen, Fluids, Rest and doxycycline  See orders in Epic

## 2018-08-30 NOTE — PROGRESS NOTES
Outpatient Physical Therapy Discharge Note     Patient:  Ruddy Browning  : 1960    Beginning/End Dates of Reporting Period:  3/1/2018 to 3/6/2018    Referring Provider: Dr. Rachel Ayoub    Therapy Diagnosis: gait dysfunction, knee pain     Client Self Report: I had an MRI today. My knee is very painful.     Goals:  Goal Identifier 1   Goal Description Pt will report 75 to 100% decrease in pain on left knee and have correct gait pattern for efficient ambulation to preserve pain managment.   Target Date 18   Date Met      Progress: Pt called to cancel all of his appts due to Dr finding a stress fracture in his knee.      Goal Identifier 2   Goal Description Pt will have 5/5 VMO strength plus 5/5 left hip and knee strength to allow him to walk wiht correct gait pattern and to climb stairs without deviations.   Target Date 18   Date Met      Progress:     Plan:  Discharge from therapy.    Discharge:    Reason for Discharge: Change in medical status.    Equipment Issued: none    Discharge Plan: Other services: per .

## 2018-08-30 NOTE — ADDENDUM NOTE
Encounter addended by: Tila Jones, PT on: 8/30/2018  4:37 PM<BR>     Actions taken: Pend clinical note, Sign clinical note, Episode resolved

## 2018-09-24 ENCOUNTER — OFFICE VISIT (OUTPATIENT)
Dept: FAMILY MEDICINE | Facility: CLINIC | Age: 58
End: 2018-09-24
Payer: COMMERCIAL

## 2018-09-24 VITALS
BODY MASS INDEX: 37.21 KG/M2 | TEMPERATURE: 96.5 F | OXYGEN SATURATION: 97 % | HEART RATE: 86 BPM | SYSTOLIC BLOOD PRESSURE: 128 MMHG | RESPIRATION RATE: 14 BRPM | WEIGHT: 282 LBS | DIASTOLIC BLOOD PRESSURE: 72 MMHG

## 2018-09-24 DIAGNOSIS — Z23 NEED FOR PROPHYLACTIC VACCINATION AND INOCULATION AGAINST INFLUENZA: ICD-10-CM

## 2018-09-24 DIAGNOSIS — F41.1 GAD (GENERALIZED ANXIETY DISORDER): ICD-10-CM

## 2018-09-24 DIAGNOSIS — I10 ESSENTIAL HYPERTENSION, BENIGN: ICD-10-CM

## 2018-09-24 PROCEDURE — 90682 RIV4 VACC RECOMBINANT DNA IM: CPT | Performed by: FAMILY MEDICINE

## 2018-09-24 PROCEDURE — 90471 IMMUNIZATION ADMIN: CPT | Performed by: FAMILY MEDICINE

## 2018-09-24 PROCEDURE — 99214 OFFICE O/P EST MOD 30 MIN: CPT | Mod: 25 | Performed by: FAMILY MEDICINE

## 2018-09-24 ASSESSMENT — ANXIETY QUESTIONNAIRES
3. WORRYING TOO MUCH ABOUT DIFFERENT THINGS: NOT AT ALL
6. BECOMING EASILY ANNOYED OR IRRITABLE: NOT AT ALL
IF YOU CHECKED OFF ANY PROBLEMS ON THIS QUESTIONNAIRE, HOW DIFFICULT HAVE THESE PROBLEMS MADE IT FOR YOU TO DO YOUR WORK, TAKE CARE OF THINGS AT HOME, OR GET ALONG WITH OTHER PEOPLE: NOT DIFFICULT AT ALL
7. FEELING AFRAID AS IF SOMETHING AWFUL MIGHT HAPPEN: NOT AT ALL
2. NOT BEING ABLE TO STOP OR CONTROL WORRYING: NOT AT ALL
GAD7 TOTAL SCORE: 0
5. BEING SO RESTLESS THAT IT IS HARD TO SIT STILL: NOT AT ALL
1. FEELING NERVOUS, ANXIOUS, OR ON EDGE: NOT AT ALL

## 2018-09-24 ASSESSMENT — PATIENT HEALTH QUESTIONNAIRE - PHQ9: 5. POOR APPETITE OR OVEREATING: NOT AT ALL

## 2018-09-24 ASSESSMENT — PAIN SCALES - GENERAL: PAINLEVEL: NO PAIN (0)

## 2018-09-24 NOTE — PROGRESS NOTES
SUBJECTIVE:   Ruddy Browning is a 58 year old male who presents to clinic today for the following health issues:      Hypertension Follow-up      Outpatient blood pressures are not being checked.    Low Salt Diet: no added salt    Anxiety Follow-Up    Status since last visit: Improved feels back to normal.      Other associated symptoms:None    Complicating factors:   Significant life event: No   Current substance abuse: None  Depression symptoms: No  SYDNEE-7 SCORE 3/12/2018 4/4/2018 9/24/2018   Total Score 5 5 0       SYDNEE-7    Amount of exercise or physical activity: 4-5 days/week for an average of 30-45 minutes    Problems taking medications regularly: No    Medication side effects: delayed ejaculation    Diet: low salt        PROBLEMS TO ADD ON...  Patient has noted that he has had decreased ejaculation since he went up to the higher dose of the sertraline.  He can attain erections and maintain them but it is is difficult to complete and ejaculate on this medication.  It is been 6 months since his symptoms have been very well controlled on the sertraline and is wondering if he can stop the medication.  He does not like the way it affects his sexual function.  Is also noted some increased swelling in his legs that he has had for the past few months now.  He works on his feet all day and usually notices the swelling just toward the end of the shift and when he gets home from work.  She usually gone in the mornings when he wakes up.  He denies any shortness of breath chest pain or exertional pain.  He has been sleeping well and does use the clonazepam at night which seems to help him sleep very well.    Problem list and histories reviewed & adjusted, as indicated.  Additional history: as documented    Patient Active Problem List   Diagnosis     Plantar fascial fibromatosis     Mixed hyperlipidemia     Hereditary and idiopathic peripheral neuropathy     Essential hypertension, benign     ESOPHAGEAL REFLUX      Varicose veins of lower extremities with complications     Reyes's esophagus     HYPERLIPIDEMIA LDL GOAL <130     Morbid obesity (H)     Tear of medial cartilage or meniscus of Rt knee, current     Edema     Hypertension goal BP (blood pressure) < 140/90     Elevated cholesterol with elevated triglycerides     Benign essential hypertension     Migraine without status migrainosus, not intractable, unspecified migraine type     Contusion of bone     SYDNEE (generalized anxiety disorder)     Other insomnia     Past Surgical History:   Procedure Laterality Date     COLONOSCOPY  2013    Procedure: COLONOSCOPY;  Colonoscopy, Esophagogastroduodenoscopy with Single Biopsy;  Surgeon: Eddie Marshall MD;  Location: [facility]     ESOPHAGOSCOPY, GASTROSCOPY, DUODENOSCOPY (EGD), COMBINED  2013    Procedure: COMBINED ESOPHAGOSCOPY, GASTROSCOPY, DUODENOSCOPY (EGD), BIOPSY SINGLE OR MULTIPLE;;  Surgeon: Eddie Marshall MD;  Location: [facility]     ESOPHAGOSCOPY, GASTROSCOPY, DUODENOSCOPY (EGD), COMBINED N/A 2017    Procedure: COMBINED ESOPHAGOSCOPY, GASTROSCOPY, DUODENOSCOPY (EGD), BIOPSY SINGLE OR MULTIPLE;  Surgeon: Kodi Ng MD;  Location: [facility]     [facility] UGI ENDOSCOPY DIAG W BIOPSY  2008, 03/12/10     OPEN REDUCTION INTERNAL FIXATION WRIST Right 2016     REMOVE NAIL BED/FINGER TIP Left 2016     ROTATOR CUFF REPAIR RT/LT Right 3/2016       Social History   Substance Use Topics     Smoking status: Never Smoker     Smokeless tobacco: Never Used     Alcohol use 0.0 oz/week     0 Standard drinks or equivalent per week      Comment: rare     Family History   Problem Relation Age of Onset     HEART DISEASE Father      alcohol dependant,  at age 52     Lipids Father      Cerebrovascular Disease Brother      had an aneurysm     Arthritis Sister      rheumatiod arthritis     Hypertension Father          Current Outpatient Prescriptions   Medication Sig Dispense Refill     aspirin 81 MG tablet ONE  DAILY       Cholecalciferol (VITAMIN D3 PO) Take 1,000 Units by mouth daily       clonazePAM (KLONOPIN) 0.5 MG tablet Take 1-2 tablets (0.5-1 mg) by mouth nightly as needed for anxiety 30 tablet 5     ibuprofen (ADVIL/MOTRIN) 600 MG tablet Take 1 tablet (600 mg) by mouth every 6 hours as needed for moderate pain 60 tablet 0     lisinopril (PRINIVIL/ZESTRIL) 10 MG tablet Take 1 tablet (10 mg) by mouth daily 90 tablet 3     LORazepam (ATIVAN) 1 MG tablet Take 1 tablet (1 mg) by mouth every 8 hours as needed for anxiety 10 tablet 0     MULTI VITAMIN MENS OR TABS 1 tablet daily       omeprazole (PRILOSEC) 40 MG capsule Take 1 capsule (40 mg) by mouth daily Take 30-60 minutes before a meal. 90 capsule 3     pravastatin (PRAVACHOL) 20 MG tablet Take 1 tablet (20 mg) by mouth daily 90 tablet 3     sertraline (ZOLOFT) 50 MG tablet Take 1/2 tablet every morning for 2 wks then increase to a full tab every morning 90 tablet 1     cetirizine (ZYRTEC) 10 MG tablet Take 1 tablet (10 mg) by mouth daily (Patient not taking: Reported on 9/24/2018) 90 tablet 3     doxycycline (VIBRAMYCIN) 100 MG capsule Take 1 capsule (100 mg) by mouth 2 times daily (Patient not taking: Reported on 9/24/2018) 28 capsule 0     sucralfate (CARAFATE) 1 GM tablet Take 1 tablet (1 g) by mouth 2 times daily as needed (Patient not taking: Reported on 9/24/2018) 40 tablet 1     SUMAtriptan (IMITREX) 100 MG tablet Take 1 tablet (100 mg) by mouth at onset of headache for migraine May repeat in 2 hours if headache not gone. Max dose is 200 mg in 24 hours (Patient not taking: Reported on 9/24/2018) 9 tablet 6     SUMAtriptan Succinate Refill (IMITREX) 6 MG/0.5ML SOCT INJECT 0.5ML FOR ONE DOSE. MAY REPEAT IN 1 HR, DO NOT EXCEED 2 SHOTS IN 24 HOURS (Patient not taking: Reported on 9/24/2018) 2 mL 2     Allergies   Allergen Reactions     No Known Drug Allergies      Tramadol      Anxiety      Recent Labs   Lab Test  03/12/18   1617  02/24/18   0807  02/21/17   1415   01/17/17   1601  12/10/16   0804  08/29/16   1652   12/29/14   1821   LDL   --   109*   --    --   111*  Cannot estimate LDL when triglyceride exceeds 400 mg/dL  89   < >   --    HDL   --   41   --    --   35*  30*   < >   --    TRIG   --   125   --    --   169*  592*   < >   --    ALT   --   35  42  39   --   42   < >   --    CR   --   0.90  2.07*  1.66*   --   0.98   < >  1.01   GFRESTIMATED   --   87  33*  43*   --   80   < >  77   GFRESTBLACK   --   >90  40*  52*   --   >90   GFR Calc     < >  >90   GFR Calc     POTASSIUM   --   4.3  3.7  4.0   --   4.0   < >  3.8   TSH  0.82   --    --    --    --    --    --   2.97    < > = values in this interval not displayed.      BP Readings from Last 3 Encounters:   09/24/18 128/72   08/02/18 (!) 156/100   04/04/18 134/86    Wt Readings from Last 3 Encounters:   09/24/18 282 lb (127.9 kg)   04/04/18 281 lb (127.5 kg)   04/04/18 281 lb (127.5 kg)                  Labs reviewed in EPIC    Reviewed and updated as needed this visit by clinical staff  Tobacco  Allergies  Meds  Soc Hx      Reviewed and updated as needed this visit by Provider         ROS:  Constitutional, HEENT, cardiovascular, pulmonary, gi and gu systems are negative, except as otherwise noted.    OBJECTIVE:     /72 (BP Location: Left arm, Patient Position: Sitting, Cuff Size: Adult Regular)  Pulse 86  Temp 96.5  F (35.8  C) (Temporal)  Resp 14  Wt 282 lb (127.9 kg)  SpO2 97%  BMI 37.21 kg/m2  Body mass index is 37.21 kg/(m^2).  GENERAL: healthy, alert and no distress his affect is very good today he has no anxiety and has great eye contact.  He and his wife are in a very good mood today.  NECK: no adenopathy, no asymmetry, masses, or scars and thyroid normal to palpation  RESP: lungs clear to auscultation - no rales, rhonchi or wheezes  CV: regular rate and rhythm, normal S1 S2, no S3 or S4, no murmur, click or rub, no peripheral edema and peripheral pulses  "strong  MS: He has trace edema to mid shin bilaterally    Diagnostic Test Results:  none     ASSESSMENT/PLAN:   (I10) Essential hypertension, benign  Comment: Very stable on his current dose of medication.  Plan: No plans for changing the medication, recheck in 6 months.    (F41.1) SYDNEE (generalized anxiety disorder)  Comment: His anxiety has essentially resolved on his current medication, sertraline.  Plan: He does not like the side effect of the delayed ejaculation and would like to stop this medication.  We will cut him down to a half a dose of 25 mg over the next 2 weeks and then go 25 mg every other day for another 2 weeks then stop.  I told him that if he at any time starts developing anxiety symptoms again we may need to put him back on something in the lower dose of the sertraline may not affect his ejaculation or we can try something completely different.  He will keep me posted.  I will see him in 6 months for his next blood pressure check and he will let me know how things are going.    (Z23) Need for prophylactic vaccination and inoculation against influenza  Comment: Requesting flu vaccine  Plan: FLU VACCINE, (RIV4) RECOMBINANT HA  , IM         (FluBlok, egg free) [90506]- >18 YRS ([facility]         recommended  50-64 YRS), Vaccine         Administration, Initial [69798]        Given         Tobacco Cessation:   reports that he has never smoked. He has never used smokeless tobacco.      BMI:   Estimated body mass index is 37.21 kg/(m^2) as calculated from the following:    Height as of 3/12/18: 6' 1\" (1.854 m).    Weight as of this encounter: 282 lb (127.9 kg).   Weight management plan: Not addressed today.    Electronically signed by:  Maikol Allen M.D.  9/24/2018    "

## 2018-09-24 NOTE — MR AVS SNAPSHOT
After Visit Summary   9/24/2018    Ruddy Browning    MRN: [id number]           Patient Information     Date Of Birth          1960        Visit Information        Provider Department      9/24/2018 3:40 PM Maikol Allen MD [facility]        Today's Diagnoses     Need for prophylactic vaccination and inoculation against influenza    -  1       Follow-ups after your visit        Your next 10 appointments already scheduled     Mar 18, 2019  5:20 PM CDT   Office Visit with Maikol Allen MD   [facility] ([facility]    [address] [place] [address] [id number] [phone]           Bring a current list of meds and any records pertaining to this visit. For Physicals, please bring immunization records and any forms needing to be filled out. Please arrive 10 minutes early to complete paperwork.              Who to contact     If you have questions or need follow up information about today's clinic visit or your schedule please contact [facility] directly at [phone].  Normal or non-critical lab and imaging results will be communicated to you by MyChart, letter or phone within 4 business days after the clinic has received the results. If you do not hear from us within 7 days, please contact the clinic through [employer] or phone. If you have a critical or abnormal lab result, we will notify you by phone as soon as possible.  Submit refill requests through [employer] or call your pharmacy and they will forward the refill request to us. Please allow 3 business days for your refill to be completed.          Additional Information About Your Visit        Care EveryWhere ID     This is your Care EveryWhere ID. This could be used by other organizations to access your [facility] medical records  [id number]        Your Vitals Were     Pulse Temperature Respirations Pulse Oximetry BMI (Body Mass Index)       86 96.5  F (35.8  C)  (Temporal) 14 97% 37.21 kg/m2        Blood Pressure from Last 3 Encounters:   09/24/18 128/72   08/02/18 (!) 156/100   04/04/18 134/86    Weight from Last 3 Encounters:   09/24/18 282 lb (127.9 kg)   04/04/18 281 lb (127.5 kg)   04/04/18 281 lb (127.5 kg)              We Performed the Following     FLU VACCINE, (RIV4) RECOMBINANT HA  , IM (FluBlok, egg free) [70195]- >18 YRS ([facility] recommended  50-64 YRS)     Vaccine Administration, Initial [10580]        Primary Care Provider Office Phone # Fax #    Maikol Allen [phone]       [address] [place] [id number]        Equal Access to Services     [facility]: Fannie robin Sosteve, waaxda luqadaha, qaybta kaalmada raymond, dara chadwick . So [facility] [phone].    ATENCIÓN: Si habla español, tiene a ross disposición servicios gratuitos de asistencia lingüística. Jack al [phone].    We comply with applicable federal civil rights laws and [place] laws. We do not discriminate on the basis of race, color, national origin, age, disability, sex, sexual orientation, or gender identity.            Thank you!     Thank you for choosing [facility]  for your care. Our goal is always to provide you with excellent care. Hearing back from our patients is one way we can continue to improve our services. Please take a few minutes to complete the written survey that you may receive in the mail after your visit with us. Thank you!             Your Updated Medication List - Protect others around you: Learn how to safely use, store and throw away your medicines at www.disposemymeds.org.          This list is accurate as of 9/24/18  6:18 PM.  Always use your most recent med list.                   Brand Name Dispense Instructions for use Diagnosis    aspirin 81 MG tablet      ONE DAILY    Essential hypertension, benign, Mixed hyperlipidemia       cetirizine 10 MG tablet    zyrTEC    90 tablet     Take 1 tablet (10 mg) by mouth daily    Seasonal allergies       clonazePAM 0.5 MG tablet    klonoPIN    30 tablet    Take 1-2 tablets (0.5-1 mg) by mouth nightly as needed for anxiety    Other insomnia       doxycycline 100 MG capsule    VIBRAMYCIN    28 capsule    Take 1 capsule (100 mg) by mouth 2 times daily    Lyme disease       ibuprofen 600 MG tablet    ADVIL/MOTRIN    60 tablet    Take 1 tablet (600 mg) by mouth every 6 hours as needed for moderate pain    Left knee pain, unspecified chronicity       lisinopril 10 MG tablet    PRINIVIL/ZESTRIL    90 tablet    Take 1 tablet (10 mg) by mouth daily    Essential hypertension, benign       LORazepam 1 MG tablet    ATIVAN    10 tablet    Take 1 tablet (1 mg) by mouth every 8 hours as needed for anxiety    SYDNEE (generalized anxiety disorder)       MULTI vitamin  MENS Tabs      1 tablet daily        omeprazole 40 MG capsule    priLOSEC    90 capsule    Take 1 capsule (40 mg) by mouth daily Take 30-60 minutes before a meal.    Reyes's esophagus without dysplasia       pravastatin 20 MG tablet    PRAVACHOL    90 tablet    Take 1 tablet (20 mg) by mouth daily    Hyperlipidemia LDL goal <130       sertraline 50 MG tablet    ZOLOFT    90 tablet    Take 1/2 tablet every morning for 2 wks then increase to a full tab every morning    SYDNEE (generalized anxiety disorder)       sucralfate 1 GM tablet    CARAFATE    40 tablet    Take 1 tablet (1 g) by mouth 2 times daily as needed    Gastroesophageal reflux disease with esophagitis, Arthralgia, unspecified joint, Reyes's esophagus without dysplasia       * SUMAtriptan 100 MG tablet    IMITREX    9 tablet    Take 1 tablet (100 mg) by mouth at onset of headache for migraine May repeat in 2 hours if headache not gone. Max dose is 200 mg in 24 hours    Migraine without status migrainosus, not intractable, unspecified migraine type       * SUMAtriptan Succinate Refill 6 MG/0.5ML Soct    IMITREX    2 mL    INJECT 0.5ML FOR ONE  DOSE. MAY REPEAT IN 1 HR, DO NOT EXCEED 2 SHOTS IN 24 HOURS    Migraine headache       VITAMIN D3 PO      Take 1,000 Units by mouth daily        * Notice:  This list has 2 medication(s) that are the same as other medications prescribed for you. Read the directions carefully, and ask your doctor or other care provider to review them with you.

## 2018-09-24 NOTE — NURSING NOTE
Prior to injection verified patient identity using patient's name and date of birth.   Patient instructed to remain in clinic for 20 minutes afterwards, and to report any adverse reaction to me immediately.  Danielle Tan MA

## 2018-09-25 ASSESSMENT — ANXIETY QUESTIONNAIRES: GAD7 TOTAL SCORE: 0

## 2018-09-28 DIAGNOSIS — G47.09 OTHER INSOMNIA: ICD-10-CM

## 2018-09-28 NOTE — TELEPHONE ENCOUNTER
Clonazepam      Last Written Prescription Date:  3-12-+18  Last Fill Quantity: 30,   # refills: 5  Last Office Visit: 9-  Future Office visit:       Routing refill request to provider for review/approval because:  Drug not on the G, P or [facility] refill protocol or controlled substance

## 2018-10-03 RX ORDER — CLONAZEPAM 0.5 MG/1
.5-1 TABLET ORAL
Qty: 60 TABLET | Refills: 5 | Status: SHIPPED | OUTPATIENT
Start: 2018-10-03 | End: 2019-04-01

## 2018-10-04 NOTE — TELEPHONE ENCOUNTER
Script faxed to [facility] [phone] pharmacy.    Merle RANGEL

## 2018-11-25 DIAGNOSIS — G43.909 MIGRAINE HEADACHE: ICD-10-CM

## 2018-11-26 RX ORDER — SUMATRIPTAN SUCCINATE 6 MG/.5ML
INJECTION, SOLUTION SUBCUTANEOUS
Qty: 2 ML | Refills: 2 | Status: SHIPPED | OUTPATIENT
Start: 2018-11-26 | End: 2020-08-07

## 2018-11-26 NOTE — TELEPHONE ENCOUNTER
"Imitrex  Last Written Prescription Date:  11/16/2017  Last Fill Quantity: 2mL,  # refills: 2   Last office visit: 9/24/2018 with prescribing provider:  Tiffany   Future Office Visit:  None  Prescription approved per [facility]ill Protocol.    Requested Prescriptions   Pending Prescriptions Disp Refills     SUMAtriptan Succinate Refill (IMITREX) 6 MG/0.5ML SOCT [Pharmacy Med Name: SUMATRIPTAN SUC REF 6MG] 2 mL 2     Sig: INJECT 0.5ML FOR ONE DOSE. MAY REPEAT IN 1 HR, DO NOT EXCEED 2 SHOTS IN 24 HOURS    Serotonin Agonists Failed    11/25/2018 11:34 AM       Failed - Serotonin Agonist request needs review.    Please review patient's record. If patient has had 8 or more treatments in the past month, please forward to provider.       Passed - Blood pressure under 140/90 in past 12 months    BP Readings from Last 3 Encounters:   09/24/18 128/72   08/02/18 (!) 156/100   04/04/18 134/86          Passed - Recent (12 mo) or future (30 days) visit within the authorizing provider's specialty    Patient had office visit in the last 12 months or has a visit in the next 30 days with authorizing provider or within the authorizing provider's specialty.  See \"Patient Info\" tab in inbasket, or \"Choose Columns\" in Meds & Orders section of the refill encounter.         Passed - Patient is age 18 or older      Crystal Martin RN   "

## 2018-12-12 DIAGNOSIS — F41.1 GAD (GENERALIZED ANXIETY DISORDER): ICD-10-CM

## 2018-12-13 NOTE — TELEPHONE ENCOUNTER
"Last Written Prescription Date:  4/4/18  Last Fill Quantity: 90,  # refills: 1   Last office visit: 9/24/2018 with prescribing provider:  Maikol Allen   Future Office Visit:      Requested Prescriptions   Pending Prescriptions Disp Refills     sertraline (ZOLOFT) 50 MG tablet [Pharmacy Med Name: SERTRALINE HCL 50MG TABS] 90 tablet 1     Sig: TAKE 1/2 TABLET BY MOUTH EVERY MORNING FOR 2 WEEKS; THEN INCREASE TO 1 TABLET BY MOUTH EVERY MORNING    SSRIs Protocol Passed - 12/12/2018  1:02 AM       Passed - Recent (12 mo) or future (30 days) visit within the authorizing provider's specialty    Patient had office visit in the last 12 months or has a visit in the next 30 days with authorizing provider or within the authorizing provider's specialty.  See \"Patient Info\" tab in inbasket, or \"Choose Columns\" in Meds & Orders section of the refill encounter.             Passed - Patient is age 18 or older        Lee Ann Raya RN     "

## 2019-01-03 ENCOUNTER — TELEPHONE (OUTPATIENT)
Dept: FAMILY MEDICINE | Facility: CLINIC | Age: 59
End: 2019-01-03

## 2019-01-03 NOTE — TELEPHONE ENCOUNTER
Called patient to reschedule appt on 3/18/2019 as provider is unavailable. Appt rescheduled for 4/1/2019

## 2019-01-30 DIAGNOSIS — G43.909 MIGRAINE WITHOUT STATUS MIGRAINOSUS, NOT INTRACTABLE, UNSPECIFIED MIGRAINE TYPE: ICD-10-CM

## 2019-01-30 NOTE — TELEPHONE ENCOUNTER
"Imitrex  Last Written Prescription Date:  04/04/2017  Last Fill Quantity: 9,  # refills: 6   Last office visit: 9/24/2018 with prescribing provider:  Tiffany   Future Office Visit:   Next 5 appointments (look out 90 days)    Apr 01, 2019  3:40 PM CDT  [facility] [address] [place] [address] [id number] [phone]      Routing refill request to provider for review/approval because:  A break in medication    Requested Prescriptions   Pending Prescriptions Disp Refills     SUMAtriptan (IMITREX) 100 MG tablet [Pharmacy Med Name: SUMATRIPTAN SUCCINATE 100MG TABS] 9 tablet 6     Sig: TAKE 1 TABLET BY MOUTH AT ONSET OF HEADACHE FOR MIGRAINE. MAY REPEAT IN 2 HOURS IF HEADACHE IS NOT GONE (MAXIMUM DOSE IS 200MG IN 24 HOURS)    Serotonin Agonists Failed - 1/30/2019 10:57 AM       Failed - Serotonin Agonist request needs review.    Please review patient's record. If patient has had 8 or more treatments in the past month, please forward to provider.         Passed - Blood pressure under 140/90 in past 12 months    BP Readings from Last 3 Encounters:   09/24/18 128/72   08/02/18 (!) 156/100   04/04/18 134/86          Passed - Recent (12 mo) or future (30 days) visit within the authorizing provider's specialty    Patient had office visit in the last 12 months or has a visit in the next 30 days with authorizing provider or within the authorizing provider's specialty.  See \"Patient Info\" tab in inbasket, or \"Choose Columns\" in Meds & Orders section of the refill encounter.         Passed - Medication is active on med list       Passed - Patient is age 18 or older      Crystal Martin RN  "

## 2019-02-04 RX ORDER — SUMATRIPTAN 100 MG/1
TABLET, FILM COATED ORAL
Qty: 9 TABLET | Refills: 6 | Status: SHIPPED | OUTPATIENT
Start: 2019-02-04 | End: 2019-05-13

## 2019-04-01 ENCOUNTER — OFFICE VISIT (OUTPATIENT)
Dept: FAMILY MEDICINE | Facility: CLINIC | Age: 59
End: 2019-04-01
Payer: COMMERCIAL

## 2019-04-01 VITALS
RESPIRATION RATE: 18 BRPM | SYSTOLIC BLOOD PRESSURE: 122 MMHG | WEIGHT: 283 LBS | HEIGHT: 73 IN | HEART RATE: 84 BPM | BODY MASS INDEX: 37.51 KG/M2 | DIASTOLIC BLOOD PRESSURE: 86 MMHG | TEMPERATURE: 97.1 F | OXYGEN SATURATION: 98 %

## 2019-04-01 DIAGNOSIS — F41.1 GAD (GENERALIZED ANXIETY DISORDER): Primary | ICD-10-CM

## 2019-04-01 DIAGNOSIS — G47.09 OTHER INSOMNIA: ICD-10-CM

## 2019-04-01 DIAGNOSIS — K22.70 BARRETT'S ESOPHAGUS WITHOUT DYSPLASIA: ICD-10-CM

## 2019-04-01 DIAGNOSIS — E78.5 HYPERLIPIDEMIA LDL GOAL <130: ICD-10-CM

## 2019-04-01 DIAGNOSIS — I10 ESSENTIAL HYPERTENSION, BENIGN: ICD-10-CM

## 2019-04-01 PROCEDURE — 99214 OFFICE O/P EST MOD 30 MIN: CPT | Performed by: FAMILY MEDICINE

## 2019-04-01 RX ORDER — LISINOPRIL 10 MG/1
10 TABLET ORAL DAILY
Qty: 90 TABLET | Refills: 3 | Status: SHIPPED | OUTPATIENT
Start: 2019-04-01 | End: 2020-04-29

## 2019-04-01 RX ORDER — PRAVASTATIN SODIUM 20 MG
20 TABLET ORAL DAILY
Qty: 90 TABLET | Refills: 3 | Status: SHIPPED | OUTPATIENT
Start: 2019-04-01 | End: 2020-04-29

## 2019-04-01 RX ORDER — CLONAZEPAM 0.5 MG/1
.5-1 TABLET ORAL
Qty: 60 TABLET | Refills: 5 | Status: SHIPPED | OUTPATIENT
Start: 2019-04-01 | End: 2019-11-10

## 2019-04-01 RX ORDER — OMEPRAZOLE 40 MG/1
40 CAPSULE, DELAYED RELEASE ORAL DAILY
Qty: 90 CAPSULE | Refills: 3 | Status: SHIPPED | OUTPATIENT
Start: 2019-04-01 | End: 2020-04-21

## 2019-04-01 ASSESSMENT — ANXIETY QUESTIONNAIRES
7. FEELING AFRAID AS IF SOMETHING AWFUL MIGHT HAPPEN: NOT AT ALL
2. NOT BEING ABLE TO STOP OR CONTROL WORRYING: NOT AT ALL
GAD7 TOTAL SCORE: 0
1. FEELING NERVOUS, ANXIOUS, OR ON EDGE: NOT AT ALL
IF YOU CHECKED OFF ANY PROBLEMS ON THIS QUESTIONNAIRE, HOW DIFFICULT HAVE THESE PROBLEMS MADE IT FOR YOU TO DO YOUR WORK, TAKE CARE OF THINGS AT HOME, OR GET ALONG WITH OTHER PEOPLE: NOT DIFFICULT AT ALL
5. BEING SO RESTLESS THAT IT IS HARD TO SIT STILL: NOT AT ALL
3. WORRYING TOO MUCH ABOUT DIFFERENT THINGS: NOT AT ALL
6. BECOMING EASILY ANNOYED OR IRRITABLE: NOT AT ALL

## 2019-04-01 ASSESSMENT — MIFFLIN-ST. JEOR: SCORE: 2165.73

## 2019-04-01 ASSESSMENT — PATIENT HEALTH QUESTIONNAIRE - PHQ9
5. POOR APPETITE OR OVEREATING: NOT AT ALL
SUM OF ALL RESPONSES TO PHQ QUESTIONS 1-9: 2

## 2019-04-01 ASSESSMENT — PAIN SCALES - GENERAL: PAINLEVEL: NO PAIN (0)

## 2019-04-01 NOTE — PROGRESS NOTES
SUBJECTIVE:   Ruddy Browning is a 58 year old male who presents to clinic today for the following health issues:      Hyperlipidemia Follow-Up      Rate your low fat/cholesterol diet?: good    Taking statin?  No    Other lipid medications/supplements?:  none    Hypertension Follow-up      Outpatient blood pressures are not being checked.    Low Salt Diet: not monitoring salt    Depression Followup    Status since last visit: Stable     See PHQ-9 for current symptoms.  Other associated symptoms: None    Complicating factors:   Significant life event:  No   Current substance abuse:  None  Anxiety or Panic symptoms:  no    PHQ 3/12/2018 4/4/2018   PHQ-9 Total Score 6 0   Q9: Thoughts of better off dead/self-harm past 2 weeks Several days Not at all     PHQ-9  English  PHQ-9   Any Language  Suicide Assessment Five-step Evaluation and Treatment (SAFE-T)    Amount of exercise or physical activity: None    Problems taking medications regularly: No    Medication side effects: none    Diet: regular (no restrictions)    Problem list and histories reviewed & adjusted, as indicated.  Additional history: as documented below    Ruddy reports his anxiety is significantly improved. He is not having any issues. Even reports his claustrophobic feelings have improved, and no longer has issues in elevators or crowded spaces. Having no issues with the klonopin, and takes one tablet at night. Does reports some daytime drowsiness, where he often feels half asleep. He is uncertain if he snores or stops breathing at night. Also reports increased stress as his son was diagnosed with lymphoma.     Currently on 10 mg lisinopril daily for HTN. Not having issues with this. Does a lot of walking with his job. Has lost weight in the past with calorie tracking, and would like to start this again.    BP Readings from Last 3 Encounters:   04/01/19 122/86   09/24/18 128/72   08/02/18 (!) 156/100    Wt Readings from Last 3 Encounters:   04/01/19  "128.4 kg (283 lb)   09/24/18 127.9 kg (282 lb)   04/04/18 127.5 kg (281 lb)         Reviewed and updated as needed this visit by clinical staff  Tobacco  Allergies  Meds       Reviewed and updated as needed this visit by Provider         ROS:  Constitutional, HEENT, cardiovascular, pulmonary, gi and gu systems are negative, except as otherwise noted.    OBJECTIVE:     /86   Pulse 84   Temp 97.1  F (36.2  C) (Temporal)   Resp 18   Ht 1.859 m (6' 1.2\")   Wt 128.4 kg (283 lb)   SpO2 98%   BMI 37.13 kg/m    Body mass index is 37.13 kg/m .  GENERAL: healthy, alert and no distress  RESP: lungs clear to auscultation - no rales, rhonchi or wheezes  CV: regular rate and rhythm, normal S1 S2, no S3 or S4, no murmur, click or rub, no peripheral edema and peripheral pulses strong  MS: no gross musculoskeletal defects noted, no edema  SKIN: no suspicious lesions or rashes  NEURO: Normal strength and tone, mentation intact and speech normal  PSYCH: mentation appears normal, affect normal/bright    Diagnostic Test Results:  none     ASSESSMENT/PLAN:   (F41.1) SYDNEE (generalized anxiety disorder)  (primary encounter diagnosis)  Comment: Patient reports anxiety has significantly improved. GAD7 was 0 today. Will continue clonazepam at night to help sleep and to help with anxiety.  Plan: Doing well, continue clonazepam at night.    (G47.09) Other insomnia  Comment: Has been experiencing significant daytime sleepiness. No known history of snoring or stopping breathing at night, but has a BMI of 37. Will put in referral for a sleep evaluation, so he can go through the screening questionnaire for sleep apnea. May benefit from CPAP.   Plan: clonazePAM (KLONOPIN) 0.5 MG tablet, SLEEP         EVALUATION & MANAGEMENT REFERRAL - [facility]         [phone] (Age 13 if over 100 lbs)    (I10) Essential hypertension, benign  Comment: BP well controlled today at 122/86. Currently on " lisinopril 10 mg daily with no issues. No med changes at this time. Labs not needed today as had been done in February. Did discuss weight management today.  Plan: lisinopril (PRINIVIL/ZESTRIL) 10 MG tablet    (K22.70) Reyes's esophagus without dysplasia  Comment: Currently stable on omeprazole, which was refilled today.  Plan: omeprazole (PRILOSEC) 40 MG DR capsule    (E78.5) Hyperlipidemia LDL goal <130  Comment: Last lipid panel in February. LDL was slightly elevated, but otherwise normal lipid panel. Currently on pravastatin 20 mg, which was refilled today.  Plan: pravastatin (PRAVACHOL) 20 MG tablet      Follow up in 6 months.    Patient was seen and examined by myself and Dr. Allen. The note was then scribed by me.     Rachel Maradiaga, MS3  April 1, 2019    I agree with the PFSH and ROS as completed by the MS.  The remainder of the encounter was performed by me and scribed by the MS.  The scribed note accurately reflects my personal services and the decisions made by me.     Electronically signed by:  Maikol Allen M.D.  4/1/2019

## 2019-04-01 NOTE — NURSING NOTE
Chief Complaint   Patient presents with     Anxiety     recheck     Lipids     Hypertension     MP/MA

## 2019-04-02 ASSESSMENT — ANXIETY QUESTIONNAIRES: GAD7 TOTAL SCORE: 0

## 2019-05-13 DIAGNOSIS — G43.909 MIGRAINE WITHOUT STATUS MIGRAINOSUS, NOT INTRACTABLE, UNSPECIFIED MIGRAINE TYPE: Primary | ICD-10-CM

## 2019-05-13 RX ORDER — SUMATRIPTAN 100 MG/1
TABLET, FILM COATED ORAL
Qty: 9 TABLET | Refills: 6 | Status: SHIPPED | OUTPATIENT
Start: 2019-05-13 | End: 2021-05-12

## 2019-05-13 NOTE — TELEPHONE ENCOUNTER
"Imitrex  Last Written Prescription Date:  02/04/2019  Last Fill Quantity: 9,  # refills: 6   Last office visit: 4/1/2019 with prescribing provider:  Tiffany   Future Office Visit:  None  Routing refill request to provider for review/approval because:  Patient has have more then 8 treatments in the past month.     Requested Prescriptions   Pending Prescriptions Disp Refills     SUMAtriptan (IMITREX) 100 MG tablet 9 tablet 6     Sig: TAKE 1 TABLET BY MOUTH AT ONSET OF HEADACHE FOR MIGRAINE. MAY REPEAT IN 2 HOURS IF HEADACHE IS NOT GONE (MAXIMUM DOSE IS 200MG IN 24 HOURS)       Serotonin Agonists Failed - 5/13/2019  1:16 PM        Failed - Serotonin Agonist request needs review.     Please review patient's record. If patient has had 8 or more treatments in the past month, please forward to provider.        Passed - Blood pressure under 140/90 in past 12 months     BP Readings from Last 3 Encounters:   04/01/19 122/86   09/24/18 128/72   08/02/18 (!) 156/100           Passed - Recent (12 mo) or future (30 days) visit within the authorizing provider's specialty     Patient had office visit in the last 12 months or has a visit in the next 30 days with authorizing provider or within the authorizing provider's specialty.  See \"Patient Info\" tab in inbasket, or \"Choose Columns\" in Meds & Orders section of the refill encounter.          Passed - Medication is active on med list        Passed - Patient is age 18 or older      Crystal Martin RN   "

## 2019-05-13 NOTE — TELEPHONE ENCOUNTER
Requested Prescriptions   Pending Prescriptions Disp Refills     SUMAtriptan (IMITREX) 100 MG tablet 9 tablet 6     Sig: TAKE 1 TABLET BY MOUTH AT ONSET OF HEADACHE FOR MIGRAINE. MAY REPEAT IN 2 HOURS IF HEADACHE IS NOT GONE (MAXIMUM DOSE IS 200MG IN 24 HOURS)    Last Written Prescription Date:  2/4/19  Last Fill Quantity: 9,  # refills: 6   Last office visit: 4/1/2019 with prescribing provider:  4/1/19   Future Office Visit:           There is no refill protocol information for this order

## 2019-11-10 DIAGNOSIS — F41.1 GAD (GENERALIZED ANXIETY DISORDER): Primary | ICD-10-CM

## 2019-11-10 DIAGNOSIS — G47.09 OTHER INSOMNIA: ICD-10-CM

## 2019-11-11 NOTE — TELEPHONE ENCOUNTER
Clonazepam  Last Written Prescription Date:  04/01/2019  Last Fill Quantity: 60,  # refills: 5   Last office visit: 4/1/2019 with prescribing provider:  Tiffany   Future Office Visit:  None    Routing refill request to provider for review/approval because:  Drug not on the [facility] refill protocol     Crystal Martin RN

## 2019-11-12 RX ORDER — CLONAZEPAM 0.5 MG/1
TABLET ORAL
Qty: 60 TABLET | Refills: 5 | Status: SHIPPED | OUTPATIENT
Start: 2019-11-12 | End: 2020-04-29

## 2020-03-03 ENCOUNTER — HOSPITAL ENCOUNTER (EMERGENCY)
Facility: CLINIC | Age: 60
Discharge: HOME | End: 2020-03-03
Attending: EMERGENCY MEDICINE | Admitting: EMERGENCY MEDICINE
Payer: COMMERCIAL

## 2020-03-03 ENCOUNTER — APPOINTMENT (OUTPATIENT)
Dept: GENERAL RADIOLOGY | Facility: CLINIC | Age: 60
End: 2020-03-03
Attending: EMERGENCY MEDICINE
Payer: COMMERCIAL

## 2020-03-03 ENCOUNTER — NURSE TRIAGE (OUTPATIENT)
Dept: FAMILY MEDICINE | Facility: CLINIC | Age: 60
End: 2020-03-03

## 2020-03-03 VITALS
DIASTOLIC BLOOD PRESSURE: 103 MMHG | HEART RATE: 87 BPM | TEMPERATURE: 99.6 F | OXYGEN SATURATION: 94 % | RESPIRATION RATE: 20 BRPM | SYSTOLIC BLOOD PRESSURE: 130 MMHG

## 2020-03-03 DIAGNOSIS — E86.0 DEHYDRATION: ICD-10-CM

## 2020-03-03 DIAGNOSIS — B34.9 VIRAL SYNDROME: ICD-10-CM

## 2020-03-03 LAB
ALBUMIN SERPL-MCNC: 3.2 G/DL (ref 3.4–5)
ALBUMIN UR-MCNC: NEGATIVE MG/DL
ALP SERPL-CCNC: 79 U/L (ref 40–150)
ALT SERPL W P-5'-P-CCNC: 66 U/L (ref 0–70)
ANION GAP SERPL CALCULATED.3IONS-SCNC: 7 MMOL/L (ref 3–14)
APPEARANCE UR: CLEAR
AST SERPL W P-5'-P-CCNC: 43 U/L (ref 0–45)
BASOPHILS # BLD AUTO: 0.1 10E9/L (ref 0–0.2)
BASOPHILS NFR BLD AUTO: 0.9 %
BILIRUB SERPL-MCNC: 0.5 MG/DL (ref 0.2–1.3)
BILIRUB UR QL STRIP: NEGATIVE
BUN SERPL-MCNC: 15 MG/DL (ref 7–30)
CALCIUM SERPL-MCNC: 8.5 MG/DL (ref 8.5–10.1)
CHLORIDE SERPL-SCNC: 103 MMOL/L (ref 94–109)
CO2 SERPL-SCNC: 26 MMOL/L (ref 20–32)
COLOR UR AUTO: ABNORMAL
CREAT SERPL-MCNC: 0.85 MG/DL (ref 0.66–1.25)
DIFFERENTIAL METHOD BLD: NORMAL
EOSINOPHIL NFR BLD AUTO: 1.2 %
ERYTHROCYTE [DISTWIDTH] IN BLOOD BY AUTOMATED COUNT: 12.8 % (ref 10–15)
FLUAV+FLUBV AG SPEC QL: NEGATIVE
FLUAV+FLUBV AG SPEC QL: NEGATIVE
GFR SERPL CREATININE-BSD FRML MDRD: >90 ML/MIN/{1.73_M2}
GLUCOSE SERPL-MCNC: 101 MG/DL (ref 70–99)
GLUCOSE UR STRIP-MCNC: NEGATIVE MG/DL
HCT VFR BLD AUTO: 41.2 % (ref 40–53)
HGB BLD-MCNC: 13.8 G/DL (ref 13.3–17.7)
HGB UR QL STRIP: NEGATIVE
HYALINE CASTS #/AREA URNS LPF: 6 /LPF (ref 0–2)
IMM GRANULOCYTES # BLD: 0 10E9/L (ref 0–0.4)
IMM GRANULOCYTES NFR BLD: 0.4 %
KETONES UR STRIP-MCNC: NEGATIVE MG/DL
LEUKOCYTE ESTERASE UR QL STRIP: NEGATIVE
LYMPHOCYTES # BLD AUTO: 0.8 10E9/L (ref 0.8–5.3)
LYMPHOCYTES NFR BLD AUTO: 14.7 %
MCH RBC QN AUTO: 29.3 PG (ref 26.5–33)
MCHC RBC AUTO-ENTMCNC: 33.5 G/DL (ref 31.5–36.5)
MCV RBC AUTO: 88 FL (ref 78–100)
MONOCYTES # BLD AUTO: 0.8 10E9/L (ref 0–1.3)
MONOCYTES NFR BLD AUTO: 14 %
MUCOUS THREADS #/AREA URNS LPF: PRESENT /LPF
NEUTROPHILS # BLD AUTO: 3.9 10E9/L (ref 1.6–8.3)
NEUTROPHILS NFR BLD AUTO: 68.8 %
NITRATE UR QL: NEGATIVE
NRBC # BLD AUTO: 0 10*3/UL
NRBC BLD AUTO-RTO: 0 /100
PH UR STRIP: 5 PH (ref 5–7)
PLATELET # BLD AUTO: 204 10E9/L (ref 150–450)
POTASSIUM SERPL-SCNC: 4 MMOL/L (ref 3.4–5.3)
PROT SERPL-MCNC: 7.2 G/DL (ref 6.8–8.8)
RBC # BLD AUTO: 4.71 10E12/L (ref 4.4–5.9)
RBC #/AREA URNS AUTO: 1 /HPF (ref 0–2)
SODIUM SERPL-SCNC: 136 MMOL/L (ref 133–144)
SOURCE: ABNORMAL
SP GR UR STRIP: 1.03 (ref 1–1.03)
SPECIMEN SOURCE: NORMAL
SQUAMOUS #/AREA URNS AUTO: <1 /HPF (ref 0–1)
UROBILINOGEN UR STRIP-MCNC: 2 MG/DL (ref 0–2)
WBC # BLD AUTO: 5.6 10E9/L (ref 4–11)
WBC #/AREA URNS AUTO: 2 /HPF (ref 0–5)

## 2020-03-03 PROCEDURE — 99284 EMERGENCY DEPT VISIT MOD MDM: CPT | Mod: Z6 | Performed by: EMERGENCY MEDICINE

## 2020-03-03 PROCEDURE — 87804 INFLUENZA ASSAY W/OPTIC: CPT | Performed by: EMERGENCY MEDICINE

## 2020-03-03 PROCEDURE — 81001 URINALYSIS AUTO W/SCOPE: CPT | Performed by: EMERGENCY MEDICINE

## 2020-03-03 PROCEDURE — 87804 INFLUENZA ASSAY W/OPTIC: CPT | Mod: 59 | Performed by: EMERGENCY MEDICINE

## 2020-03-03 PROCEDURE — 96360 HYDRATION IV INFUSION INIT: CPT | Performed by: EMERGENCY MEDICINE

## 2020-03-03 PROCEDURE — 99284 EMERGENCY DEPT VISIT MOD MDM: CPT | Mod: 25 | Performed by: EMERGENCY MEDICINE

## 2020-03-03 PROCEDURE — 85025 COMPLETE CBC W/AUTO DIFF WBC: CPT | Performed by: EMERGENCY MEDICINE

## 2020-03-03 PROCEDURE — 25800030 ZZH RX IP 258 OP 636: Performed by: EMERGENCY MEDICINE

## 2020-03-03 PROCEDURE — 71046 X-RAY EXAM CHEST 2 VIEWS: CPT | Mod: TC

## 2020-03-03 PROCEDURE — 80053 COMPREHEN METABOLIC PANEL: CPT | Performed by: EMERGENCY MEDICINE

## 2020-03-03 RX ADMIN — SODIUM CHLORIDE 1000 ML: 9 INJECTION, SOLUTION INTRAVENOUS at 11:58

## 2020-03-03 ASSESSMENT — ENCOUNTER SYMPTOMS
NAUSEA: 0
COUGH: 1
WHEEZING: 0
CHILLS: 1
SINUS PAIN: 0
DIARRHEA: 0
MYALGIAS: 0
HEADACHES: 1
ACTIVITY CHANGE: 1
SINUS PRESSURE: 0
BACK PAIN: 0
ARTHRALGIAS: 0
DIZZINESS: 1
ABDOMINAL PAIN: 0
WEAKNESS: 1
FATIGUE: 1
ABDOMINAL DISTENTION: 0
VOMITING: 0
FREQUENCY: 0
PALPITATIONS: 0
LIGHT-HEADEDNESS: 1
DIAPHORESIS: 1
SHORTNESS OF BREATH: 0
FLANK PAIN: 0
SORE THROAT: 0

## 2020-03-03 NOTE — ED TRIAGE NOTES
Has been feeling weak/lightheaded since Thursday. States has felt hot and cold all weekend. States his urine is orange and stools black.

## 2020-03-03 NOTE — TELEPHONE ENCOUNTER
"  Reason for Disposition    SEVERE dizziness (e.g., unable to stand, requires support to walk, feels like passing out now)    Answer Assessment - Initial Assessment Questions  1. DESCRIPTION: \"Describe your dizziness.\"      Lightheaded like to tip over has to grab wall  2. LIGHTHEADED: \"Do you feel lightheaded?\" (e.g., somewhat faint, woozy, weak upon standing)      Feels dizzy when standing  3. VERTIGO: \"Do you feel like either you or the room is spinning or tilting?\" (i.e. vertigo)      no  4. SEVERITY: \"How bad is it?\"  \"Do you feel like you are going to faint?\" \"Can you stand and walk?\"    - MILD - walking normally    - MODERATE - interferes with normal activities (e.g., work, school)     - SEVERE - unable to stand, requires support to walk, feels like passing out now.      At times feels like he could tip over  5. ONSET:  \"When did the dizziness begin?\"     friday  6. AGGRAVATING FACTORS: \"Does anything make it worse?\" (e.g., standing, change in head position)     Standing walking  7. HEART RATE: \"Can you tell me your heart rate?\" \"How many beats in 15 seconds?\"  (Note: not all patients can do this)        Feels blood pressure might be low  8. CAUSE: \"What do you think is causing the dizziness?\"      unsure  9. RECURRENT SYMPTOM: \"Have you had dizziness before?\" If so, ask: \"When was the last time?\" \"What happened that time?\"      Few years ago   10. OTHER SYMPTOMS: \"Do you have any other symptoms?\" (e.g., fever, chest pain, vomiting, diarrhea, bleeding)        [place] urine  11. PREGNANCY: \"Is there any chance you are pregnant?\" \"When was your last menstrual period?\"        none    Protocols used: DIZZINESS - [id number]    "

## 2020-03-03 NOTE — TELEPHONE ENCOUNTER
Patient reports having dizziness/lightheadedness  since Friday. He has to grab walls to walk and feels like h could fall over when walking. Denies fever. Feels his blood pressure may be low. He also reports having orange urine. He is urinating normal amounts, denies frequency or dysuria.    Advised per protocol to go to ED.  Juany Pierre RN BSN

## 2020-03-03 NOTE — TELEPHONE ENCOUNTER
Reason for call:  Patient reporting a symptom    Symptom or request: Patient has been having issues with feeing dizzy and weak. He drinks a lot of water, but his urine is very dark. Wondering if he needs to be seen.     Duration (how long have symptoms been present): 4 days    Have you been treated for this before? No    Additional comments:     Phone Number patient can be reached at:  Home number on file [phone] (home)    Best Time:  any    Can we leave a detailed message on this number:  YES    Call taken on 3/3/2020 at 9:45 AM by Rosina Macias CNA

## 2020-03-03 NOTE — ED PROVIDER NOTES
History     Chief Complaint   Patient presents with     Dizziness     Fatigue     HPI  Ruddy Browning is a 59 year old male who presents with fatigue and cough. He has had increased fatigue for the last 1 week. He works installing fire sprinklers and noticed that he was getting fatigued and dizzy when climbing the ladders. He went home early from work on Friday. He reports that he felt alternating warm and chilled throughout the weekend. He did not take his temperature. He has attempted to keep hydrated with water and carbonated water over the last 4-5 days. He noted dark colored urine yesterday and today as well as dark stools this AM. He has not had any nausea and vomiting. He has not had any loose stools or obvious blood in his stools. No chest pain or shortness of breath. No others in the home are ill.     Allergies:  Allergies   Allergen Reactions     No Known Drug Allergies      Tramadol      Anxiety        Problem List:    Patient Active Problem List    Diagnosis Date Noted     Contusion of bone 03/12/2018     Priority: Medium     SYDNEE (generalized anxiety disorder) 03/12/2018     Priority: Medium     Other insomnia 03/12/2018     Priority: Medium     Migraine without status migrainosus, not intractable, unspecified migraine type 04/04/2017     Priority: Medium     Benign essential hypertension 02/22/2017     Priority: Medium     Elevated cholesterol with elevated triglycerides 08/29/2016     Priority: Medium     Edema 12/29/2014     Priority: Medium     Hypertension goal BP (blood pressure) < 140/90 12/29/2014     Priority: Medium     Tear of medial cartilage or meniscus of Rt knee, current 10/17/2013     Priority: Medium     Morbid obesity (H) 02/26/2013     Priority: Medium     HYPERLIPIDEMIA LDL GOAL <130 10/31/2010     Priority: Medium     Reyes's esophagus 03/04/2009     Priority: Medium     Varicose veins of lower extremities with complications 07/17/2007     Priority: Medium     Problem list  name updated by automated process. Provider to review       ESOPHAGEAL REFLUX 11/27/2006     Priority: Medium     Essential hypertension, benign 10/26/2004     Priority: Medium     Hereditary and idiopathic peripheral neuropathy 05/22/2002     Priority: Medium     Problem list name updated by automated process. Provider to review       Mixed hyperlipidemia 02/05/2002     Priority: Medium     Plantar fascial fibromatosis 10/03/2001     Priority: Medium     left          Past Medical History:    Past Medical History:   Diagnosis Date     Arthritis      Reyes's esophagus with esophagitis 2008     Esophageal reflux 11/27/2006     Gastro-oesophageal reflux disease      Migraine      Morbid obesity (H) 1/22/2009     Obesity, unspecified      Pure hypercholesterolemia      SPRAIN SHOULDER/ARM NOS 12/28/2005     Tear of medial cartilage or meniscus of Rt knee, current 10/17/2013     Unspecified essential hypertension        Past Surgical History:    Past Surgical History:   Procedure Laterality Date     COLONOSCOPY  4/8/2013    Procedure: COLONOSCOPY;  Colonoscopy, Esophagogastroduodenoscopy with Single Biopsy;  Surgeon: Eddie Marshall MD;  Location: [facility]     ESOPHAGOSCOPY, GASTROSCOPY, DUODENOSCOPY (EGD), COMBINED  4/8/2013    Procedure: COMBINED ESOPHAGOSCOPY, GASTROSCOPY, DUODENOSCOPY (EGD), BIOPSY SINGLE OR MULTIPLE;;  Surgeon: Eddie Marshall MD;  Location: [facility]     ESOPHAGOSCOPY, GASTROSCOPY, DUODENOSCOPY (EGD), COMBINED N/A 1/25/2017    Procedure: COMBINED ESOPHAGOSCOPY, GASTROSCOPY, DUODENOSCOPY (EGD), BIOPSY SINGLE OR MULTIPLE;  Surgeon: Kodi Ng MD;  Location: [facility]      UGI ENDOSCOPY DIAG W BIOPSY  12/5/2008, 03/12/10     OPEN REDUCTION INTERNAL FIXATION WRIST Right 2/2016     REMOVE NAIL BED/FINGER TIP Left 5/2016     ROTATOR CUFF REPAIR RT/LT Right 3/2016       Family History:    Family History   Problem Relation Age of Onset     Heart Disease Father         alcohol dependant,   at age 52     Lipids Father      Cerebrovascular Disease Brother         had an aneurysm     Arthritis Sister         rheumatiod arthritis     Hypertension Father        Social History:  Marital Status:   [2]  Social History     Tobacco Use     Smoking status: Never Smoker     Smokeless tobacco: Never Used   Substance Use Topics     Alcohol use: Yes     Alcohol/week: 0.0 standard drinks     Comment: rare     Drug use: No        Medications:    aspirin 81 MG tablet  cetirizine (ZYRTEC) 10 MG tablet  Cholecalciferol (VITAMIN D3 PO)  clonazePAM (KLONOPIN) 0.5 MG tablet  ibuprofen (ADVIL/MOTRIN) 600 MG tablet  lisinopril (PRINIVIL/ZESTRIL) 10 MG tablet  MULTI VITAMIN MENS OR TABS  omeprazole (PRILOSEC) 40 MG DR capsule  pravastatin (PRAVACHOL) 20 MG tablet  sucralfate (CARAFATE) 1 GM tablet  SUMAtriptan (IMITREX) 100 MG tablet  SUMAtriptan Succinate Refill (IMITREX) 6 MG/0.5ML SOCT          Review of Systems   Constitutional: Positive for activity change, chills, diaphoresis and fatigue.   HENT: Negative for congestion, sinus pressure, sinus pain and sore throat.    Respiratory: Positive for cough. Negative for shortness of breath and wheezing.    Cardiovascular: Negative for chest pain and palpitations.   Gastrointestinal: Negative for abdominal distention, abdominal pain, diarrhea, nausea and vomiting.   Genitourinary: Positive for decreased urine volume. Negative for enuresis, flank pain, frequency and urgency.   Musculoskeletal: Negative for arthralgias, back pain and myalgias.   Neurological: Positive for dizziness, weakness, light-headedness and headaches. Negative for syncope.   All other systems are reviewed and are negative      Physical Exam   BP: (!) 140/88  Pulse: 87  Heart Rate: 94  Temp: 99.6  F (37.6  C)  Resp: 20  SpO2: 95 %      Physical Exam  Constitutional:       General: He is awake.      Appearance: Normal appearance. He is ill-appearing. He is not toxic-appearing or diaphoretic.   HENT:  "     Head: Normocephalic and atraumatic.      Right Ear: Hearing and tympanic membrane normal.      Left Ear: Hearing and tympanic membrane normal.   Cardiovascular:      Rate and Rhythm: Normal rate and regular rhythm.      Pulses: Normal pulses.           Radial pulses are 2+ on the right side and 2+ on the left side.      Heart sounds: Normal heart sounds.   Pulmonary:      Effort: Pulmonary effort is normal. No tachypnea or respiratory distress.      Breath sounds: Normal breath sounds and air entry.   Abdominal:      General: Abdomen is flat. Bowel sounds are normal. There is no distension.      Palpations: Abdomen is soft.      Tenderness: There is no abdominal tenderness.   Neurological:      General: No focal deficit present.      Mental Status: He is alert and oriented to person, place, and time.   Psychiatric:         Attention and Perception: Attention and perception normal.         Mood and Affect: Mood normal.         Speech: Speech normal.         Behavior: Behavior is cooperative.         ED Course  Patient was interviewed to gather HPI and ROS details. Physical exam was preformed and there were no acute findings. We proceeded with laboratory testing including a CBC, CMP, influenza swab, and UA. We also obtained a chest xray. He was given a 1 L IV bolus of 0.9% NS. He states that he feels a \"little better\" following the IV fluid administration.          Results for orders placed or performed during the hospital encounter of 03/03/20 (from the past 24 hour(s))   CBC with platelets differential   Result Value Ref Range    WBC 5.6 4.0 - 11.0 10e9/L    RBC Count 4.71 4.4 - 5.9 10e12/L    Hemoglobin 13.8 13.3 - 17.7 g/dL    Hematocrit 41.2 40.0 - 53.0 %    MCV 88 78 - 100 fl    MCH 29.3 26.5 - 33.0 pg    MCHC 33.5 31.5 - 36.5 g/dL    RDW 12.8 10.0 - 15.0 %    Platelet Count 204 150 - 450 10e9/L    Diff Method Automated Method     % Neutrophils 68.8 %    % Lymphocytes 14.7 %    % Monocytes 14.0 %    % " Eosinophils 1.2 %    % Basophils 0.9 %    % Immature Granulocytes 0.4 %    Nucleated RBCs 0 0 /100    Absolute Neutrophil 3.9 1.6 - 8.3 10e9/L    Absolute Lymphocytes 0.8 0.8 - 5.3 10e9/L    Absolute Monocytes 0.8 0.0 - 1.3 10e9/L    Absolute Basophils 0.1 0.0 - 0.2 10e9/L    Abs Immature Granulocytes 0.0 0 - 0.4 10e9/L    Absolute Nucleated RBC 0.0    Comprehensive metabolic panel   Result Value Ref Range    Sodium 136 133 - 144 mmol/L    Potassium 4.0 3.4 - 5.3 mmol/L    Chloride 103 94 - 109 mmol/L    Carbon Dioxide 26 20 - 32 mmol/L    Anion Gap 7 3 - 14 mmol/L    Glucose 101 (H) 70 - 99 mg/dL    Urea Nitrogen 15 7 - 30 mg/dL    Creatinine 0.85 0.66 - 1.25 mg/dL    GFR Estimate >90 >60 mL/min/[1.73_m2]    GFR Estimate If Black >90 >60 mL/min/[1.73_m2]    Calcium 8.5 8.5 - 10.1 mg/dL    Bilirubin Total 0.5 0.2 - 1.3 mg/dL    Albumin 3.2 (L) 3.4 - 5.0 g/dL    Protein Total 7.2 6.8 - 8.8 g/dL    Alkaline Phosphatase 79 40 - 150 U/L    ALT 66 0 - 70 U/L    AST 43 0 - 45 U/L   Influenza A/B antigen   Result Value Ref Range    Influenza A/B Agn Specimen Nasopharyngeal     Influenza A Negative NEG^Negative    Influenza B Negative NEG^Negative   UA with Microscopic   Result Value Ref Range    Color Urine Kassy     Appearance Urine Clear     Glucose Urine Negative NEG^Negative mg/dL    Bilirubin Urine Negative NEG^Negative    Ketones Urine Negative NEG^Negative mg/dL    Specific Gravity Urine 1.032 1.003 - 1.035    Blood Urine Negative NEG^Negative    pH Urine 5.0 5.0 - 7.0 pH    Protein Albumin Urine Negative NEG^Negative mg/dL    Urobilinogen mg/dL 2.0 0.0 - 2.0 mg/dL    Nitrite Urine Negative NEG^Negative    Leukocyte Esterase Urine Negative NEG^Negative    Source Unspecified Urine     WBC Urine 2 0 - 5 /HPF    RBC Urine 1 0 - 2 /HPF    Squamous Epithelial /HPF Urine <1 0 - 1 /HPF    Mucous Urine Present (A) NEG^Negative /LPF    Hyaline Casts 6 (H) 0 - 2 /LPF   XR Chest 2 Views    Narrative    XR CHEST 2 VW  3/3/2020  12:22 PM       INDICATION: fever and cough  COMPARISON: None       Impression    IMPRESSION: Negative chest.    TWYLA THOMAS MD       Medications   0.9% sodium chloride BOLUS (0 mLs Intravenous Stopped 3/3/20 1300)       Assessments & Plan (with Medical Decision Making)  1. Viral syndrome   -The laboratory work and chest xray do not show any acute infective process. His symptoms are likely related to a viral illness starting last week. He has been attempting to maintain hydration and rest over the past weekend, but may need some additional time to get back to his baseline.   2. Dehydration   -Due to his subjective reports of fever and chills it is likely he has become dehydrated. He reports some improvement in symptoms with the administration of IV fluids. He was encouraged to maintain oral hydration at home with small frequent amounts of fluids.   He was discharged to home in satisfactory condition. He was advised to follow-up with his PCP if he does not have improvement in the next 2-3 days. He was also advised to return to the ER with any worsening symptoms.      I have reviewed the nursing notes.    I have reviewed the findings, diagnosis, plan and need for follow up with the patient.       Discharge Medication List as of 3/3/2020  1:43 PM          Final diagnoses:   Viral syndrome   Dehydration       3/3/2020   [facility] EMERGENCY DEPARTMENT     Rigoberto Catalan MD  03/03/20 8809

## 2020-03-03 NOTE — ED AVS SNAPSHOT
[facility]  [address] [place] [address]  Phone:  [phone]  Fax:  [phone]                                    Ruddy Browning   MRN: [id number]    Department:  [facility]   Date of Visit:  3/3/2020           After Visit Summary Signature Page    I have received my discharge instructions, and my questions have been answered. I have discussed any challenges I see with this plan with the nurse or doctor.    ..........................................................................................................................................  Patient/Patient Representative Signature      ..........................................................................................................................................  Patient Representative Print Name and Relationship to Patient    ..................................................               ................................................  Date                                   Time    ..........................................................................................................................................  Reviewed by Signature/Title    ...................................................              ..............................................  Date                                               Time          22EPIC Rev 08/18

## 2020-04-20 DIAGNOSIS — K22.70 BARRETT'S ESOPHAGUS WITHOUT DYSPLASIA: ICD-10-CM

## 2020-04-21 RX ORDER — OMEPRAZOLE 40 MG/1
CAPSULE, DELAYED RELEASE ORAL
Qty: 90 CAPSULE | Refills: 0 | Status: SHIPPED | OUTPATIENT
Start: 2020-04-21 | End: 2020-04-29

## 2020-04-21 NOTE — TELEPHONE ENCOUNTER
"Omeprazole  Last Written Prescription Date:  04/01/2019  Last Fill Quantity: 90,  # refills: 3   Last office visit: 4/1/2019 with prescribing provider:  Tiffany   Future Office Visit:  None  Medication is being filled for 1 time refill only due to:  Patient needs to be seen because it has been more than one year since last visit.    Requested Prescriptions   Pending Prescriptions Disp Refills     omeprazole (PRILOSEC) 40 MG DR capsule [Pharmacy Med Name: OMEPRAZOLE 40MG CPDR] 90 capsule 3     Sig: TAKE ONE CAPSULE BY MOUTH ONCE DAILY 30-60 MINUTES BEFORE MEALS       PPI Protocol Failed - 4/20/2020  5:28 AM        Failed - Recent (12 mo) or future (30 days) visit within the authorizing provider's specialty     Patient has had an office visit with the authorizing provider or a provider within the authorizing providers department within the previous 12 mos or has a future within next 30 days. See \"Patient Info\" tab in inbasket, or \"Choose Columns\" in Meds & Orders section of the refill encounter.          Passed - Not on Clopidogrel (unless Pantoprazole ordered)        Passed - No diagnosis of osteoporosis on record        Passed - Medication is active on med list        Passed - Patient is age 18 or older         Crystal Martin RN   "

## 2020-04-27 DIAGNOSIS — E78.5 HYPERLIPIDEMIA LDL GOAL <130: ICD-10-CM

## 2020-04-28 RX ORDER — PRAVASTATIN SODIUM 20 MG
TABLET ORAL
Qty: 90 TABLET | Refills: 3 | OUTPATIENT
Start: 2020-04-28

## 2020-04-28 NOTE — TELEPHONE ENCOUNTER
Due for antione. Break in medication. LF 4/1/19 Will forward to [facility] Team to set up  Virtual appt. ......SANTOS Ruelas

## 2020-04-29 ENCOUNTER — VIRTUAL VISIT (OUTPATIENT)
Dept: FAMILY MEDICINE | Facility: CLINIC | Age: 60
End: 2020-04-29
Payer: COMMERCIAL

## 2020-04-29 DIAGNOSIS — G43.909 MIGRAINE WITHOUT STATUS MIGRAINOSUS, NOT INTRACTABLE, UNSPECIFIED MIGRAINE TYPE: Primary | ICD-10-CM

## 2020-04-29 DIAGNOSIS — E66.01 MORBID OBESITY (H): ICD-10-CM

## 2020-04-29 DIAGNOSIS — G47.09 OTHER INSOMNIA: ICD-10-CM

## 2020-04-29 DIAGNOSIS — F41.1 GAD (GENERALIZED ANXIETY DISORDER): ICD-10-CM

## 2020-04-29 DIAGNOSIS — E78.5 HYPERLIPIDEMIA LDL GOAL <130: ICD-10-CM

## 2020-04-29 DIAGNOSIS — E78.2 MIXED HYPERLIPIDEMIA: ICD-10-CM

## 2020-04-29 DIAGNOSIS — K22.70 BARRETT'S ESOPHAGUS WITHOUT DYSPLASIA: ICD-10-CM

## 2020-04-29 DIAGNOSIS — I10 ESSENTIAL HYPERTENSION, BENIGN: ICD-10-CM

## 2020-04-29 PROCEDURE — 99214 OFFICE O/P EST MOD 30 MIN: CPT | Mod: TEL | Performed by: FAMILY MEDICINE

## 2020-04-29 RX ORDER — CLONAZEPAM 0.5 MG/1
TABLET ORAL
Qty: 60 TABLET | Refills: 5 | Status: SHIPPED | OUTPATIENT
Start: 2020-04-29 | End: 2020-12-02

## 2020-04-29 RX ORDER — PRAVASTATIN SODIUM 20 MG
20 TABLET ORAL DAILY
Qty: 90 TABLET | Refills: 3 | Status: SHIPPED | OUTPATIENT
Start: 2020-04-29 | End: 2021-01-06

## 2020-04-29 RX ORDER — OMEPRAZOLE 40 MG/1
CAPSULE, DELAYED RELEASE ORAL
Qty: 90 CAPSULE | Refills: 2 | Status: SHIPPED | OUTPATIENT
Start: 2020-04-29 | End: 2021-04-28

## 2020-04-29 RX ORDER — LISINOPRIL 10 MG/1
10 TABLET ORAL DAILY
Qty: 90 TABLET | Refills: 3 | Status: SHIPPED | OUTPATIENT
Start: 2020-04-29 | End: 2021-04-14

## 2020-04-29 NOTE — PROGRESS NOTES
"Ruddy Browning is a 59 year old male who is being evaluated via a billable telephone visit.      The patient has been notified of following:     \"This telephone visit will be conducted via a call between you and your physician/provider. We have found that certain health care needs can be provided without the need for a physical exam.  This service lets us provide the care you need with a short phone conversation.  If a prescription is necessary we can send it directly to your pharmacy.  If lab work is needed we can place an order for that and you can then stop by our lab to have the test done at a later time.    Telephone visits are billed at different rates depending on your insurance coverage. During this emergency period, for some insurers they may be billed the same as an in-person visit.  Please reach out to your insurance provider with any questions.    If during the course of the call the physician/provider feels a telephone visit is not appropriate, you will not be charged for this service.\"    Patient has given verbal consent for Telephone visit?  Yes    How would you like to obtain your AVS?     Subjective     Ruddy Browning is a 59 year old male who presents to clinic today for the following health issues:    HPI  Medication Followup of Omeprazole    Taking Medication as prescribed: yes    Side Effects:  None    Medication Helping Symptoms:  yes     Patient was in need of refill for his omeprazole.  States without this proton pump inhibitor he has recurrence of reflux which maintains even after the withdrawal..  He occasionally has migraines.  They are not a severity often is able to work through them.  He describes the episode of mixed up thinking from tramadol.  It made him more anxious than he usually is.  He uses benzodiazepines for anxiety and does well.  He has recovered from his major injury within the last couple years.  Occasionally still has some knee pain.  Currently he did not work for " the last month to limit his exposure for coronavirus.  He does plan on going back to work.  He follows good hand hygiene.  He would work with a minimum number of people and therefore his exposure is limited.  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  Originally they did not want to have it open for lunchtime locally because he did not want to appear he was taking business from the unfortunate restaurants that had to close.  However he and his wife are so popular they were asked to help out in some businesses.  They have been doing this.  Patient himself hopes to retire later this year and continue running the food truck as a hobby.      Patient Active Problem List   Diagnosis     Plantar fascial fibromatosis     Mixed hyperlipidemia     Hereditary and idiopathic peripheral neuropathy     Essential hypertension, benign     ESOPHAGEAL REFLUX     Varicose veins of lower extremities with complications     Reyes's esophagus     HYPERLIPIDEMIA LDL GOAL <130     Morbid obesity (H)     Tear of medial cartilage or meniscus of Rt knee, current     Edema     Hypertension goal BP (blood pressure) < 140/90     Elevated cholesterol with elevated triglycerides     Benign essential hypertension     Migraine without status migrainosus, not intractable, unspecified migraine type     Contusion of bone     SYDNEE (generalized anxiety disorder)     Other insomnia     Past Surgical History:   Procedure Laterality Date     COLONOSCOPY  4/8/2013    Procedure: COLONOSCOPY;  Colonoscopy, Esophagogastroduodenoscopy with Single Biopsy;  Surgeon: Eddie Marshall MD;  Location: [facility]     ESOPHAGOSCOPY, GASTROSCOPY, DUODENOSCOPY (EGD), COMBINED  4/8/2013    Procedure: COMBINED ESOPHAGOSCOPY, GASTROSCOPY, DUODENOSCOPY (EGD), BIOPSY SINGLE OR MULTIPLE;;  Surgeon: Eddie Marshall MD;  Location: [facility]     ESOPHAGOSCOPY, GASTROSCOPY, DUODENOSCOPY (EGD), COMBINED N/A 1/25/2017    Procedure: COMBINED ESOPHAGOSCOPY,  GASTROSCOPY, DUODENOSCOPY (EGD), BIOPSY SINGLE OR MULTIPLE;  Surgeon: Kodi Ng MD;  Location:  GI     [facility] UGI ENDOSCOPY DIAG W BIOPSY  2008, 03/12/10     OPEN REDUCTION INTERNAL FIXATION WRIST Right 2016     REMOVE NAIL BED/FINGER TIP Left 2016     ROTATOR CUFF REPAIR RT/LT Right 3/2016       Social History     Tobacco Use     Smoking status: Never Smoker     Smokeless tobacco: Never Used   Substance Use Topics     Alcohol use: Yes     Alcohol/week: 0.0 standard drinks     Comment: rare     Family History   Problem Relation Age of Onset     Heart Disease Father         alcohol dependant,  at age 52     Lipids Father      Hypertension Father      Cerebrovascular Disease Brother         had an aneurysm     Arthritis Sister         rheumatiod arthritis         Current Outpatient Medications   Medication Sig Dispense Refill     cetirizine (ZYRTEC) 10 MG tablet Take 1 tablet (10 mg) by mouth daily 90 tablet 3     Cholecalciferol (VITAMIN D3 PO) Take 1,000 Units by mouth daily       clonazePAM (KLONOPIN) 0.5 MG tablet TAKE ONE TO TWO TABLETS BY MOUTH EVERY EVENING AS NEEDED FOR ANXIETY 60 tablet 5     ibuprofen (ADVIL/MOTRIN) 600 MG tablet Take 1 tablet (600 mg) by mouth every 6 hours as needed for moderate pain 60 tablet 0     lisinopril (ZESTRIL) 10 MG tablet Take 1 tablet (10 mg) by mouth daily 90 tablet 3     MULTI VITAMIN MENS OR TABS 1 tablet daily       omeprazole (PRILOSEC) 40 MG DR capsule TAKE ONE CAPSULE BY MOUTH ONCE DAILY 30-60 MINUTES BEFORE MEALS 90 capsule 2     pravastatin (PRAVACHOL) 20 MG tablet Take 1 tablet (20 mg) by mouth daily 90 tablet 3     SUMAtriptan (IMITREX) 100 MG tablet TAKE 1 TABLET BY MOUTH AT ONSET OF HEADACHE FOR MIGRAINE. MAY REPEAT IN 2 HOURS IF HEADACHE IS NOT GONE (MAXIMUM DOSE IS 200MG IN 24 HOURS) 9 tablet 6     SUMAtriptan Succinate Refill (IMITREX) 6 MG/0.5ML SOCT INJECT 0.5ML FOR ONE DOSE. MAY REPEAT IN 1 HR, DO NOT EXCEED 2 SHOTS IN 24 HOURS 2 mL 2      aspirin 81 MG tablet ONE DAILY       Allergies   Allergen Reactions     No Known Drug Allergies      Tramadol      Anxiety      BP Readings from Last 3 Encounters:   03/03/20 (!) 130/103   04/01/19 122/86   09/24/18 128/72    Wt Readings from Last 3 Encounters:   04/01/19 128.4 kg (283 lb)   09/24/18 127.9 kg (282 lb)   04/04/18 127.5 kg (281 lb)                    Reviewed and updated as needed this visit by Provider         Review of Systems   ROS COMP: Constitutional, HEENT, cardiovascular, pulmonary, gi and gu systems are negative, except as otherwise noted.       Objective   Reported vitals:  There were no vitals taken for this visit.   healthy, alert, no distress and cooperative  PSYCH: Alert and oriented times 3; coherent speech, normal   rate and volume, able to articulate logical thoughts, able   to abstract reason, no tangential thoughts, no hallucinations   or delusions  His affect is normal  RESP: No cough, no audible wheezing, able to talk in full sentences  Remainder of exam unable to be completed due to telephone visits    Diagnostic Test Results:  Labs reviewed in Epic        Assessment/Plan:  1. Essential hypertension, benign  It is anticipated his blood pressure is well controlled.  He will stop at a local pharmacy where his daughter works sometime in the next weeks to have blood pressure checked.  Continue lisinopril.  Laboratory is pending.  - lisinopril (ZESTRIL) 10 MG tablet; Take 1 tablet (10 mg) by mouth daily  Dispense: 90 tablet; Refill: 3  - **Comprehensive metabolic panel FUTURE 6mo; Future  - **CBC with platelets FUTURE 6mo; Future    2. Hyperlipidemia LDL goal <130  Patient continues to take pravastatin and will come for laboratory at a later date when he feels more comfortable with pandemic.  - pravastatin (PRAVACHOL) 20 MG tablet; Take 1 tablet (20 mg) by mouth daily  Dispense: 90 tablet; Refill: 3  - Lipid panel reflex to direct LDL Fasting; Future    3. Other insomnia  This  medication below helps with anxiety and sleep.  We will continue same.  - clonazePAM (KLONOPIN) 0.5 MG tablet; TAKE ONE TO TWO TABLETS BY MOUTH EVERY EVENING AS NEEDED FOR ANXIETY  Dispense: 60 tablet; Refill: 5    4. SYDNEE (generalized anxiety disorder)  As above.  - clonazePAM (KLONOPIN) 0.5 MG tablet; TAKE ONE TO TWO TABLETS BY MOUTH EVERY EVENING AS NEEDED FOR ANXIETY  Dispense: 60 tablet; Refill: 5    5. Migraine without status migrainosus, not intractable, unspecified migraine type  No special treatment for migraines as they are very intermittent and he generally can manage these.    6. Morbid obesity (H)  Encouraged to be more active.    7. Mixed hyperlipidemia  Awaiting laboratory testing as noted above    8. Reyes's esophagus without dysplasia  We discussed how important PPI is for him with Reyes's esophagus and that potential long-term side effects are not reason to ever stop this medication.  He never plans to do so.  - omeprazole (PRILOSEC) 40 MG DR capsule; TAKE ONE CAPSULE BY MOUTH ONCE DAILY 30-60 MINUTES BEFORE MEALS  Dispense: 90 capsule; Refill: 2    Return in about 3 months (around 7/29/2020) for BP Recheck, mental health.      Phone call duration:  19 minutes  Although I am not this patient's usual physician, I know him well from past experience and socially.  He is instructed to check and see when he can get an appointment this summer.  Actual visit and follow-up would be appropriate at that time.  Fransico Murray MD

## 2020-08-06 DIAGNOSIS — G43.909 MIGRAINE WITHOUT STATUS MIGRAINOSUS, NOT INTRACTABLE, UNSPECIFIED MIGRAINE TYPE: Primary | ICD-10-CM

## 2020-08-07 RX ORDER — SUMATRIPTAN SUCCINATE 6 MG/.5ML
INJECTION, SOLUTION SUBCUTANEOUS
Qty: 2 ML | Refills: 11 | Status: SHIPPED | OUTPATIENT
Start: 2020-08-07 | End: 2022-02-01

## 2020-08-07 NOTE — TELEPHONE ENCOUNTER
Routing refill request to provider for review/approval because:  Elevated Blood Pressures.     Crystal Martin RN

## 2020-12-01 DIAGNOSIS — F41.1 GAD (GENERALIZED ANXIETY DISORDER): ICD-10-CM

## 2020-12-01 DIAGNOSIS — G47.09 OTHER INSOMNIA: ICD-10-CM

## 2020-12-02 RX ORDER — CLONAZEPAM 0.5 MG/1
TABLET ORAL
Qty: 60 TABLET | Refills: 5 | Status: SHIPPED | OUTPATIENT
Start: 2020-12-02 | End: 2021-07-22

## 2020-12-02 NOTE — TELEPHONE ENCOUNTER
Klonopin      Last Written Prescription Date:  4/29/2020  Last Fill Quantity: 60,   # refills: 5  Last Office Visit: 4/29/2020  Future Office visit:       Routing refill request to provider for review/approval because:  Drug not on the [facility], [facility] or [facility] refill protocol or controlled substance

## 2020-12-30 ENCOUNTER — TELEPHONE (OUTPATIENT)
Dept: FAMILY MEDICINE | Facility: CLINIC | Age: 60
End: 2020-12-30

## 2020-12-30 NOTE — TELEPHONE ENCOUNTER
"Patient called and reports \"my head is playing games with me again.\"  Patient reports that he has had this issue in the past, Dr. Allen is aware.  He reports that seeing a dead mouse triggered this again.  He denied to go into great detail about exact mental health concerns, but reports that he is not sleeping well, nightmares, and a constant feel of claustrophobia.  He only wants to see Dr. Allen, he is aware that he is out of office currently.  He is requesting a work-in appointment when he returns to clinic.  Patient was encouraged to seek emergent care if worsening symptoms.  Patient expressed verbal understanding.     Patient also aware that [facility] will be reaching out to him next week with services provided.      Crystal Martin RN    "

## 2020-12-30 NOTE — TELEPHONE ENCOUNTER
Reason for call:  Patient reporting a symptom    Symptom or request: Mental Health Issues    Duration (how long have symptoms been present): 1 weeks    Have you been treated for this before? Yes -4/29/20    Additional comments: spouse called and stated patient's head issues and mental health seems to be struggling again. Spouse asked if Dr. Allen has any suggestions for patient. She attempted to make an appointment but the soonest with Dr. Allen was 1/27/21. Please call patient back to discuss symptoms    Phone Number patient can be reached at:  Cell number on file:    Telephone Information:   Mobile [phone]       Best Time:  Anytime    Can we leave a detailed message on this number:  YES    Call taken on 12/30/2020 at 8:14 AM by Marimar Alonso

## 2021-01-05 ENCOUNTER — VIRTUAL VISIT (OUTPATIENT)
Dept: FAMILY MEDICINE | Facility: CLINIC | Age: 61
End: 2021-01-05
Payer: COMMERCIAL

## 2021-01-05 DIAGNOSIS — I10 ESSENTIAL HYPERTENSION, BENIGN: ICD-10-CM

## 2021-01-05 DIAGNOSIS — E78.2 MIXED HYPERLIPIDEMIA: ICD-10-CM

## 2021-01-05 DIAGNOSIS — F41.1 GAD (GENERALIZED ANXIETY DISORDER): ICD-10-CM

## 2021-01-05 PROBLEM — F41.9 ANXIETY: Status: ACTIVE | Noted: 2021-01-05

## 2021-01-05 PROCEDURE — 99214 OFFICE O/P EST MOD 30 MIN: CPT | Mod: GT | Performed by: FAMILY MEDICINE

## 2021-01-05 ASSESSMENT — ANXIETY QUESTIONNAIRES
6. BECOMING EASILY ANNOYED OR IRRITABLE: NOT AT ALL
7. FEELING AFRAID AS IF SOMETHING AWFUL MIGHT HAPPEN: NOT AT ALL
3. WORRYING TOO MUCH ABOUT DIFFERENT THINGS: NOT AT ALL
IF YOU CHECKED OFF ANY PROBLEMS ON THIS QUESTIONNAIRE, HOW DIFFICULT HAVE THESE PROBLEMS MADE IT FOR YOU TO DO YOUR WORK, TAKE CARE OF THINGS AT HOME, OR GET ALONG WITH OTHER PEOPLE: NOT DIFFICULT AT ALL
GAD7 TOTAL SCORE: 1
5. BEING SO RESTLESS THAT IT IS HARD TO SIT STILL: NOT AT ALL
1. FEELING NERVOUS, ANXIOUS, OR ON EDGE: SEVERAL DAYS
2. NOT BEING ABLE TO STOP OR CONTROL WORRYING: NOT AT ALL

## 2021-01-05 ASSESSMENT — PATIENT HEALTH QUESTIONNAIRE - PHQ9
5. POOR APPETITE OR OVEREATING: NOT AT ALL
SUM OF ALL RESPONSES TO PHQ QUESTIONS 1-9: 1

## 2021-01-05 NOTE — PROGRESS NOTES
Ruddy Browning is a 60 year old male who is being evaluated via a billable video visit.      How would you like to obtain your AVS? Mail a copy  If the video visit is dropped, the invitation should be resent by: Text to cell phone: [phone]  Will anyone else be joining your video visit? No    Video Start Time: 3:10 PM  Assessment & Plan   (F41.1) SYDNEE (generalized anxiety disorder)  Comment: Symptoms of improved dramatically since being on his clonazepam at bedtime.  He is really not showing many symptoms of depression at all.  His SYDNEE-7 and PHQ-9 scores today were both 1.  Plan: We will continue with the clonazepam at bedtime through the winter months and then as summer comes and we get more daytime hours he may be able to use it only as needed.    (I10) Essential hypertension, benign  Comment: Did not get a blood pressure on today but he states that he has been feeling well.  He is due for his labs.  Plan: Albumin Random Urine Quantitative with Creat         Ratio        Lab orders were extended.    (E78.2) Mixed hyperlipidemia   Comment: He is also due for his lipid profile  Plan: Those orders were also extended.         29 minutes spent on the date of the encounter doing chart review, history and exam, documentation and further activities as noted above       SYDNEE-7 SCORE 9/24/2018 4/1/2019 1/5/2021   Total Score 0 0 1       No follow-ups on file.    Maikol Allen MD, MD  [facility]     Ruddy Browning is a 60 year old who presents to clinic today for the following health issues  accompanied by his spouse:    HPI   About a month ago he had worsening symptoms after an incidence where he found some dead mice in his grill that had been partially even by other animals.  The skin is set off his generalized anxiety to the point where he had to get back on his clonidine.  They requested a refill of his clonidine to take at bedtime which we had filled that first week in  December 2020.  He said since then he is sleeping much better at night and his symptoms have improved dramatically.  He continues take his other medications for his hypertension without incidence.  His wife was on the video call with him today and she does verify that his symptoms were significantly worse at the beginning of December but with the clonazepam and taking other steps to improve site lines within the house, keep doors open so he is not claustrophobic he has improved dramatically.  He did retire in August after taking a voluntary layoff from work.  He had enough time and with the [employer] to retire so he took an early FDC.  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  They do on [place] acres and have been doing a lot of cutting of the dead trees on the property burning brush piles and keeping themselves busy that way.    Depression and Anxiety Follow-Up    How are you doing with your depression since your last visit? Worsened     How are you doing with your anxiety since your last visit?  Worsened     Are you having other symptoms that might be associated with depression or anxiety? No    Have you had a significant life event? OTHER: Just retired      Do you have any concerns with your use of alcohol or other drugs? No    Social History     Tobacco Use     Smoking status: Never Smoker     Smokeless tobacco: Never Used   Substance Use Topics     Alcohol use: Yes     Alcohol/week: 0.0 standard drinks     Comment: rare     Drug use: No     PHQ 3/12/2018 4/4/2018 4/1/2019   PHQ-9 Total Score 6 0 2   Q9: Thoughts of better off dead/self-harm past 2 weeks Several days Not at all Not at all     SYDNEE-7 SCORE 4/4/2018 9/24/2018 4/1/2019   Total Score 5 0 0     Last PHQ-9 1/5/2021   1.  Little interest or pleasure in doing things 0   2.  Feeling down, depressed, or  hopeless 0   3.  Trouble falling or staying asleep, or sleeping too much 0   4.  Feeling tired or having little energy 1   5.  Poor appetite or overeating 0   6.  Feeling bad about yourself 0   7.  Trouble concentrating 0   8.  Moving slowly or restless 0   Q9: Thoughts of better off dead/self-harm past 2 weeks 0   PHQ-9 Total Score 1   Difficulty at work, home, or with people Not difficult at all     SYDNEE-7  1/5/2021   1. Feeling nervous, anxious, or on edge 1   2. Not being able to stop or control worrying 0   3. Worrying too much about different things 0   4. Trouble relaxing 0   5. Being so restless that it is hard to sit still 0   6. Becoming easily annoyed or irritable 0   7. Feeling afraid, as if something awful might happen 0   SYDNEE-7 Total Score 1   If you checked any problems, how difficult have they made it for you to do your work, take care of things at home, or get along with other people? Not difficult at all       Suicide Assessment Five-step Evaluation and Treatment (SAFE-T)      How many servings of fruits and vegetables do you eat daily?  2-3    On average, how many sweetened beverages do you drink each day (Examples: soda, juice, sweet tea, etc.  Do NOT count diet or artificially sweetened beverages)?   1    How many days per week do you exercise enough to make your heart beat faster? 3 or less    How many minutes a day do you exercise enough to make your heart beat faster? 9 or less    How many days per week do you miss taking your medication? 0        Review of Systems   Constitutional, HEENT, cardiovascular, pulmonary, gi and gu systems are negative, except as otherwise noted.      Objective           Vitals:  No vitals were obtained today due to virtual visit.    Physical Exam   GENERAL: Healthy, alert and no distress  EYES: Eyes grossly normal to inspection.  No discharge or erythema, or obvious scleral/conjunctival abnormalities.  RESP: No audible wheeze, cough, or visible cyanosis.  No  visible retractions or increased work of breathing.    NEURO: Cranial nerves grossly intact.  Mentation and speech appropriate for age.  PSYCH: Mentation appears normal, affect normal/bright, judgement and insight intact, normal speech and appearance well-groomed.    Lab orders were placed for him to come in tomorrow morning.        Video-Visit Details    Type of service:  Video Visit    Video End Time:3:39 PM    Originating Location (pt. Location): Home    Distant Location (provider location):  [facility]     Platform used for Video Visit: [employer]

## 2021-01-06 ENCOUNTER — TELEPHONE (OUTPATIENT)
Dept: FAMILY MEDICINE | Facility: CLINIC | Age: 61
End: 2021-01-06

## 2021-01-06 DIAGNOSIS — I10 ESSENTIAL HYPERTENSION, BENIGN: ICD-10-CM

## 2021-01-06 DIAGNOSIS — E78.2 MIXED HYPERLIPIDEMIA: Primary | ICD-10-CM

## 2021-01-06 DIAGNOSIS — E78.5 HYPERLIPIDEMIA LDL GOAL <130: ICD-10-CM

## 2021-01-06 LAB
ALBUMIN SERPL-MCNC: 4.1 G/DL (ref 3.4–5)
ALP SERPL-CCNC: 74 U/L (ref 40–150)
ALT SERPL W P-5'-P-CCNC: 40 U/L (ref 0–70)
ANION GAP SERPL CALCULATED.3IONS-SCNC: 3 MMOL/L (ref 3–14)
AST SERPL W P-5'-P-CCNC: 23 U/L (ref 0–45)
BILIRUB SERPL-MCNC: 0.6 MG/DL (ref 0.2–1.3)
BUN SERPL-MCNC: 22 MG/DL (ref 7–30)
CALCIUM SERPL-MCNC: 8.9 MG/DL (ref 8.5–10.1)
CHLORIDE SERPL-SCNC: 110 MMOL/L (ref 94–109)
CHOLEST SERPL-MCNC: 204 MG/DL
CO2 SERPL-SCNC: 26 MMOL/L (ref 20–32)
CREAT SERPL-MCNC: 0.95 MG/DL (ref 0.66–1.25)
CREAT UR-MCNC: 214 MG/DL
ERYTHROCYTE [DISTWIDTH] IN BLOOD BY AUTOMATED COUNT: 13.4 % (ref 10–15)
GFR SERPL CREATININE-BSD FRML MDRD: 87 ML/MIN/{1.73_M2}
GLUCOSE SERPL-MCNC: 109 MG/DL (ref 70–99)
HCT VFR BLD AUTO: 47.8 % (ref 40–53)
HDLC SERPL-MCNC: 34 MG/DL
HGB BLD-MCNC: 15.7 G/DL (ref 13.3–17.7)
LDLC SERPL CALC-MCNC: 133 MG/DL
MCH RBC QN AUTO: 28.9 PG (ref 26.5–33)
MCHC RBC AUTO-ENTMCNC: 32.8 G/DL (ref 31.5–36.5)
MCV RBC AUTO: 88 FL (ref 78–100)
MICROALBUMIN UR-MCNC: 15 MG/L
MICROALBUMIN/CREAT UR: 7.06 MG/G CR (ref 0–17)
NONHDLC SERPL-MCNC: 170 MG/DL
PLATELET # BLD AUTO: 230 10E9/L (ref 150–450)
POTASSIUM SERPL-SCNC: 4.6 MMOL/L (ref 3.4–5.3)
PROT SERPL-MCNC: 8.1 G/DL (ref 6.8–8.8)
RBC # BLD AUTO: 5.44 10E12/L (ref 4.4–5.9)
SODIUM SERPL-SCNC: 139 MMOL/L (ref 133–144)
TRIGL SERPL-MCNC: 185 MG/DL
WBC # BLD AUTO: 5.7 10E9/L (ref 4–11)

## 2021-01-06 PROCEDURE — 82043 UR ALBUMIN QUANTITATIVE: CPT | Performed by: FAMILY MEDICINE

## 2021-01-06 PROCEDURE — 85027 COMPLETE CBC AUTOMATED: CPT | Performed by: FAMILY MEDICINE

## 2021-01-06 PROCEDURE — 36415 COLL VENOUS BLD VENIPUNCTURE: CPT | Performed by: FAMILY MEDICINE

## 2021-01-06 PROCEDURE — 80053 COMPREHEN METABOLIC PANEL: CPT | Performed by: FAMILY MEDICINE

## 2021-01-06 PROCEDURE — 80061 LIPID PANEL: CPT | Performed by: FAMILY MEDICINE

## 2021-01-06 RX ORDER — PRAVASTATIN SODIUM 40 MG
40 TABLET ORAL DAILY
Qty: 90 TABLET | Refills: 3 | Status: SHIPPED | OUTPATIENT
Start: 2021-01-06 | End: 2021-07-22

## 2021-01-06 ASSESSMENT — ANXIETY QUESTIONNAIRES: GAD7 TOTAL SCORE: 1

## 2021-01-06 NOTE — TELEPHONE ENCOUNTER
I want him to double the current dose.  He can take two of the current 20 mg tablets that he has and I will send a new script for the 40 mg tablets.  Remind him that the new script will only require him to take one tablet, but he should take two of the current tablets that he has until they are gone. We can recheck labs in 2 months.  I placed orders for that.      Electronically signed by:  Maikol Allen M.D.  1/6/2021

## 2021-01-06 NOTE — TELEPHONE ENCOUNTER
I called pt and informed him of the results and below msg and he stated that he is taking his Pravachol and I informed him we will call him with that dose Dr. Allen wants him to be on.  Maikol Grove MA, MD  [facility]             Please call current and let him know that his electrolytes kidney function and liver functions are all normal.  The chloride was one-point higher than normal but this is unremarkable and nothing to be concerned about.  His glucose level was 109 again not in the diabetic range but kind of borderline.  His cholesterol was up a little bit total 204 and the LDL cholesterol or bad cholesterol 133.  Check with him to make sure that he is still taking his Pravachol.  If he is then we might need to up that dose a little bit to better control the cholesterol levels.  His CBC or total blood count was normal also.     Electronically signed by:   Maikol Allen M.D.   1/6/2021

## 2021-03-10 DIAGNOSIS — E78.5 HYPERLIPIDEMIA LDL GOAL <130: ICD-10-CM

## 2021-03-10 DIAGNOSIS — E78.2 MIXED HYPERLIPIDEMIA: ICD-10-CM

## 2021-03-10 LAB
ALT SERPL W P-5'-P-CCNC: 33 U/L (ref 0–70)
AST SERPL W P-5'-P-CCNC: 19 U/L (ref 0–45)
CHOLEST SERPL-MCNC: 161 MG/DL
HDLC SERPL-MCNC: 32 MG/DL
LDLC SERPL CALC-MCNC: 100 MG/DL
NONHDLC SERPL-MCNC: 129 MG/DL
TRIGL SERPL-MCNC: 143 MG/DL

## 2021-03-10 PROCEDURE — 84460 ALANINE AMINO (ALT) (SGPT): CPT | Performed by: FAMILY MEDICINE

## 2021-03-10 PROCEDURE — 80061 LIPID PANEL: CPT | Performed by: FAMILY MEDICINE

## 2021-03-10 PROCEDURE — 36415 COLL VENOUS BLD VENIPUNCTURE: CPT | Performed by: FAMILY MEDICINE

## 2021-03-10 PROCEDURE — 84450 TRANSFERASE (AST) (SGOT): CPT | Performed by: FAMILY MEDICINE

## 2021-03-13 ENCOUNTER — HEALTH MAINTENANCE LETTER (OUTPATIENT)
Age: 61
End: 2021-03-13

## 2021-04-13 DIAGNOSIS — I10 ESSENTIAL HYPERTENSION, BENIGN: ICD-10-CM

## 2021-04-14 RX ORDER — LISINOPRIL 10 MG/1
TABLET ORAL
Qty: 90 TABLET | Refills: 2 | Status: SHIPPED | OUTPATIENT
Start: 2021-04-14 | End: 2021-07-22

## 2021-04-14 NOTE — TELEPHONE ENCOUNTER
Lisinopril Prescription approved per [facility] Refill Protocol.  Had virtual visit with Dr. Allen 1/5/21. Needs to check his blood pressure sometime soon..................SANTOS Ruelas

## 2021-04-28 DIAGNOSIS — K22.70 BARRETT'S ESOPHAGUS WITHOUT DYSPLASIA: ICD-10-CM

## 2021-04-28 RX ORDER — OMEPRAZOLE 40 MG/1
CAPSULE, DELAYED RELEASE ORAL
Qty: 90 CAPSULE | Refills: 1 | Status: SHIPPED | OUTPATIENT
Start: 2021-04-28 | End: 2021-10-20

## 2021-04-28 NOTE — TELEPHONE ENCOUNTER
Prescription approved per [facility] Refill Protocol.    DARLENE MontgomeryN, RN  [facility]

## 2021-05-11 DIAGNOSIS — G43.909 MIGRAINE WITHOUT STATUS MIGRAINOSUS, NOT INTRACTABLE, UNSPECIFIED MIGRAINE TYPE: ICD-10-CM

## 2021-05-11 NOTE — TELEPHONE ENCOUNTER
Routing to covering provider as PCP is currently out of office.     Routing refill request to provider for review/approval because:  Elevated BP on file.     Crystal Martin RN

## 2021-05-12 ENCOUNTER — MYC MEDICAL ADVICE (OUTPATIENT)
Dept: FAMILY MEDICINE | Facility: CLINIC | Age: 61
End: 2021-05-12

## 2021-05-12 RX ORDER — SUMATRIPTAN 100 MG/1
TABLET, FILM COATED ORAL
Qty: 9 TABLET | Refills: 0 | Status: SHIPPED | OUTPATIENT
Start: 2021-05-12 | End: 2021-06-08

## 2021-06-07 DIAGNOSIS — G43.909 MIGRAINE WITHOUT STATUS MIGRAINOSUS, NOT INTRACTABLE, UNSPECIFIED MIGRAINE TYPE: ICD-10-CM

## 2021-06-08 RX ORDER — SUMATRIPTAN 100 MG/1
TABLET, FILM COATED ORAL
Qty: 9 TABLET | Refills: 0 | Status: SHIPPED | OUTPATIENT
Start: 2021-06-08 | End: 2022-02-01

## 2021-06-08 NOTE — TELEPHONE ENCOUNTER
Routing refill request to provider for review/approval because:  Labs out of range:  BP    DARLENE MontgomeryN, RN  [facility]

## 2021-07-22 ENCOUNTER — HOSPITAL ENCOUNTER (OUTPATIENT)
Dept: GENERAL RADIOLOGY | Facility: CLINIC | Age: 61
Discharge: HOME | End: 2021-07-22
Attending: FAMILY MEDICINE | Admitting: FAMILY MEDICINE
Payer: COMMERCIAL

## 2021-07-22 ENCOUNTER — OFFICE VISIT (OUTPATIENT)
Dept: FAMILY MEDICINE | Facility: CLINIC | Age: 61
End: 2021-07-22
Payer: COMMERCIAL

## 2021-07-22 VITALS
SYSTOLIC BLOOD PRESSURE: 138 MMHG | WEIGHT: 302 LBS | HEART RATE: 89 BPM | HEIGHT: 73 IN | DIASTOLIC BLOOD PRESSURE: 84 MMHG | BODY MASS INDEX: 40.02 KG/M2 | OXYGEN SATURATION: 98 % | TEMPERATURE: 97.9 F

## 2021-07-22 DIAGNOSIS — L71.9 ROSACEA: ICD-10-CM

## 2021-07-22 DIAGNOSIS — I10 ESSENTIAL HYPERTENSION, BENIGN: ICD-10-CM

## 2021-07-22 DIAGNOSIS — F41.1 GAD (GENERALIZED ANXIETY DISORDER): ICD-10-CM

## 2021-07-22 DIAGNOSIS — M25.512 CHRONIC LEFT SHOULDER PAIN: ICD-10-CM

## 2021-07-22 DIAGNOSIS — Z23 NEED FOR SHINGLES VACCINE: ICD-10-CM

## 2021-07-22 DIAGNOSIS — Z23 NEED FOR PROPHYLACTIC VACCINATION WITH TETANUS-DIPHTHERIA (TD): ICD-10-CM

## 2021-07-22 DIAGNOSIS — G89.29 CHRONIC LEFT SHOULDER PAIN: ICD-10-CM

## 2021-07-22 DIAGNOSIS — Z13.1 SCREENING FOR DIABETES MELLITUS: ICD-10-CM

## 2021-07-22 DIAGNOSIS — G47.09 OTHER INSOMNIA: ICD-10-CM

## 2021-07-22 DIAGNOSIS — E78.5 HYPERLIPIDEMIA LDL GOAL <130: ICD-10-CM

## 2021-07-22 DIAGNOSIS — Z00.00 ROUTINE GENERAL MEDICAL EXAMINATION AT A HEALTH CARE FACILITY: Primary | ICD-10-CM

## 2021-07-22 PROCEDURE — 73030 X-RAY EXAM OF SHOULDER: CPT | Mod: LT

## 2021-07-22 PROCEDURE — 90714 TD VACC NO PRESV 7 YRS+ IM: CPT | Performed by: FAMILY MEDICINE

## 2021-07-22 PROCEDURE — 99214 OFFICE O/P EST MOD 30 MIN: CPT | Mod: 25 | Performed by: FAMILY MEDICINE

## 2021-07-22 PROCEDURE — 90750 HZV VACC RECOMBINANT IM: CPT | Performed by: FAMILY MEDICINE

## 2021-07-22 PROCEDURE — 90471 IMMUNIZATION ADMIN: CPT | Performed by: FAMILY MEDICINE

## 2021-07-22 PROCEDURE — 90472 IMMUNIZATION ADMIN EACH ADD: CPT | Performed by: FAMILY MEDICINE

## 2021-07-22 PROCEDURE — 99396 PREV VISIT EST AGE 40-64: CPT | Mod: 25 | Performed by: FAMILY MEDICINE

## 2021-07-22 RX ORDER — PRAVASTATIN SODIUM 40 MG
40 TABLET ORAL DAILY
Qty: 90 TABLET | Refills: 3 | Status: SHIPPED | OUTPATIENT
Start: 2021-07-22 | End: 2022-08-23

## 2021-07-22 RX ORDER — CLONAZEPAM 0.5 MG/1
0.5 TABLET ORAL 2 TIMES DAILY PRN
Qty: 60 TABLET | Refills: 5 | Status: SHIPPED | OUTPATIENT
Start: 2021-07-22 | End: 2022-03-16

## 2021-07-22 RX ORDER — LISINOPRIL 10 MG/1
10 TABLET ORAL DAILY
Qty: 90 TABLET | Refills: 3 | Status: SHIPPED | OUTPATIENT
Start: 2021-07-22 | End: 2022-08-31

## 2021-07-22 ASSESSMENT — ENCOUNTER SYMPTOMS
WEAKNESS: 0
FEVER: 0
HEADACHES: 0
FREQUENCY: 0
EYE PAIN: 0
MYALGIAS: 1
NERVOUS/ANXIOUS: 0
COUGH: 0
PARESTHESIAS: 0
PALPITATIONS: 0
NAUSEA: 0
HEADACHES: 1
DIARRHEA: 0
SORE THROAT: 0
HEMATOCHEZIA: 0
DYSURIA: 0
CHILLS: 0
ABDOMINAL PAIN: 0
HEMATURIA: 0
JOINT SWELLING: 0
CONSTIPATION: 0
ARTHRALGIAS: 1
SHORTNESS OF BREATH: 0
DIZZINESS: 0
HEARTBURN: 0

## 2021-07-22 ASSESSMENT — MIFFLIN-ST. JEOR: SCORE: 2243.5

## 2021-07-22 ASSESSMENT — PAIN SCALES - GENERAL: PAINLEVEL: NO PAIN (0)

## 2021-07-22 NOTE — NURSING NOTE
Prior to injection, verified patient identity using patient's name and date of birth.  Due to injection administration, patient instructed to remain in clinic for 15 minutes  afterwards, and to report any adverse reaction to me immediately.    Screening Questionnaire for Adult Immunization     Are you sick today?   No    Do you have allergies to medications, food or any vaccine?   No    Have you ever had a serious reaction after receiving a vaccination?   No    Do you have a long-term health problem with heart disease, lung disease,  asthma, kidney disease, diabetes, anemia, metabolic or blood disease?   No    Do you have cancer, leukemia, AIDS, or any immune system problem?   No    Do you take cortisone, prednisone, other steroids, or anticancer drugs, or  have you had any x-ray (radiation) treatments?   No    Have you had a seizure, brain, or other nervous system problem?   No    During the past year, have you received a transfusion of blood or blood       products, or been given a medicine called immune (gamma) globulin?   No    For women: Are you pregnant or is there a chance you could become         pregnant during the next month?   No    Have you received any vaccinations in the past 4 weeks?   No     Immunization questionnaire answers were all negative.      MNVFC doesn't apply on this patient    Per orders of Dr. Allen , injection of TD and Shinrix was  given by Tori Royal MA. Patient instructed to remain in clinic for 20 minutes afterwards, and to report any adverse reaction to me immediately.       Screening performed by Tori Royal MA on 7/22/2021 at 1:08 PM.

## 2021-07-22 NOTE — PROGRESS NOTES
SUBJECTIVE:   CC: Ruddy Browning is an 60 year old male who presents for preventative health visit.       Patient has been advised of split billing requirements and indicates understanding: Yes  Healthy Habits:     Getting at least 3 servings of Calcium per day:  NO    Bi-annual eye exam:  Yes    Dental care twice a year:  NO    Sleep apnea or symptoms of sleep apnea:  None    Diet:  Regular (no restrictions)    Frequency of exercise:  None    Taking medications regularly:  Yes    PHQ-2 Total Score: 0    Additional concerns today:  No      PROBLEMS TO ADD ON...  He has been little bit more anxious over the last week because his mom and her  know his that that has been in town.  They left this morning so I think things will improve.  He notes his blood pressures been a little bit elevated since they have been here but otherwise has been good in the 130s to 140s over 80s.    He did have an episode of anxiety when they were out camping and did not have his Klonopin with him.  He does need a refill of that.    Has been having some chronic left shoulder pain will have pain particular with certain movements particularly reaching over laterally with the arm hanging his wife something her leg feel like there is less strength.  He has not been dropping things.    He has been having some headaches but nothing that seems to be unreasonable.  He does not have any dizziness weakness visual disturbances or nausea and vomiting associated with it.    He has noticed a rash on his cheeks and forehead region that is not improving with different face washes.  His wife thinks he has rosacea and I tend to agree with that.      Today's PHQ-2 Score:   PHQ-2 ( 1999 [employer]) 7/22/2021   Q1: Little interest or pleasure in doing things 0   Q2: Feeling down, depressed or hopeless 0   PHQ-2 Score 0   Q1: Little interest or pleasure in doing things Not at all   Q2: Feeling down, depressed or hopeless Not at all   PHQ-2 Score 0        Abuse: Current or Past(Physical, Sexual or Emotional)- No  Do you feel safe in your environment? Yes    Have you ever done Advance Care Planning? (For example, a Health Directive, POLST, or a discussion with a medical provider or your loved ones about your wishes): No, advance care planning information given to patient to review.  Patient declined advance care planning discussion at this time.    Social History     Tobacco Use     Smoking status: Never Smoker     Smokeless tobacco: Never Used   Substance Use Topics     Alcohol use: Yes     Alcohol/week: 0.0 standard drinks     Comment: rare     If you drink alcohol do you typically have >3 drinks per day or >7 drinks per week? No    Alcohol Use 7/22/2021   Prescreen: >3 drinks/day or >7 drinks/week? No   Prescreen: >3 drinks/day or >7 drinks/week? -   No flowsheet data found.    Last PSA: No results found for: PSA    Reviewed orders with patient. Reviewed health maintenance and updated orders accordingly - Yes  Lab work is in process  Labs reviewed in EPIC  BP Readings from Last 3 Encounters:   07/22/21 138/84   03/03/20 (!) 130/103   04/01/19 122/86    Wt Readings from Last 3 Encounters:   07/22/21 137 kg (302 lb)   04/01/19 128.4 kg (283 lb)   09/24/18 127.9 kg (282 lb)                  Patient Active Problem List   Diagnosis     Plantar fascial fibromatosis     Mixed hyperlipidemia     Hereditary and idiopathic peripheral neuropathy     Essential hypertension, benign     ESOPHAGEAL REFLUX     Varicose veins of lower extremities with complications     Reyes's esophagus     HYPERLIPIDEMIA LDL GOAL <130     Morbid obesity (H)     Tear of medial cartilage or meniscus of Rt knee, current     Edema     Hypertension goal BP (blood pressure) < 140/90     Elevated cholesterol with elevated triglycerides     Benign essential hypertension     Migraine without status migrainosus, not intractable, unspecified migraine type     Contusion of bone     SYDNEE (generalized  anxiety disorder)     Other insomnia     Anxiety     Rosacea     Chronic left shoulder pain     Past Surgical History:   Procedure Laterality Date     COLONOSCOPY  2013    Procedure: COLONOSCOPY;  Colonoscopy, Esophagogastroduodenoscopy with Single Biopsy;  Surgeon: Eddie Marshall MD;  Location: [facility]     ESOPHAGOSCOPY, GASTROSCOPY, DUODENOSCOPY (EGD), COMBINED  2013    Procedure: COMBINED ESOPHAGOSCOPY, GASTROSCOPY, DUODENOSCOPY (EGD), BIOPSY SINGLE OR MULTIPLE;;  Surgeon: Eddie Marshall MD;  Location: [facility]     ESOPHAGOSCOPY, GASTROSCOPY, DUODENOSCOPY (EGD), COMBINED N/A 2017    Procedure: COMBINED ESOPHAGOSCOPY, GASTROSCOPY, DUODENOSCOPY (EGD), BIOPSY SINGLE OR MULTIPLE;  Surgeon: Kodi Ng MD;  Location: [facility]     [facility] UGI ENDOSCOPY DIAG W BIOPSY  2008, 03/12/10     OPEN REDUCTION INTERNAL FIXATION WRIST Right 2016     REMOVE NAIL BED/FINGER TIP Left 2016     ROTATOR CUFF REPAIR RT/LT Right 3/2016       Social History     Tobacco Use     Smoking status: Never Smoker     Smokeless tobacco: Never Used   Substance Use Topics     Alcohol use: Yes     Alcohol/week: 0.0 standard drinks     Comment: rare     Family History   Problem Relation Age of Onset     Heart Disease Father         alcohol dependant,  at age 52     Lipids Father      Hypertension Father      Cerebrovascular Disease Brother         had an aneurysm     Arthritis Sister         rheumatiod arthritis         Current Outpatient Medications   Medication Sig Dispense Refill     aspirin 81 MG tablet ONE DAILY       cetirizine (ZYRTEC) 10 MG tablet Take 1 tablet (10 mg) by mouth daily 90 tablet 3     Cholecalciferol (VITAMIN D3 PO) Take 1,000 Units by mouth daily       clonazePAM (KLONOPIN) 0.5 MG tablet Take 1 tablet (0.5 mg) by mouth 2 times daily as needed for anxiety 60 tablet 5     ibuprofen (ADVIL/MOTRIN) 600 MG tablet Take 1 tablet (600 mg) by mouth every 6 hours as needed for moderate pain 60  tablet 0     lisinopril (ZESTRIL) 10 MG tablet Take 1 tablet (10 mg) by mouth daily 90 tablet 3     metroNIDAZOLE (METROCREAM) 0.75 % external cream Apply topically 2 times daily 45 g 4     MULTI VITAMIN MENS OR TABS 1 tablet daily       omeprazole (PRILOSEC) 40 MG DR capsule TAKE ONE CAPSULE BY MOUTH ONCE DAILY 30-60 MINUTES BEFORE MEALS 90 capsule 1     pravastatin (PRAVACHOL) 40 MG tablet Take 1 tablet (40 mg) by mouth daily 90 tablet 3     SUMAtriptan (IMITREX) 100 MG tablet TAKE 1 TABLET BY MOUTH AT ONSET OF MIGRAINE. MAY REPEAT IN 2 HOURS IF HEADACHE IS NOT GONE. (MAXIMUM DOSE IS 200MG IN 24 HOURS) 9 tablet 0     SUMAtriptan Succinate Refill (IMITREX) 6 MG/0.5ML SOCT INJECT 0.5ML FOR ONE DOSE. MAY REPEAT IN 1 HR, DO NOT EXCEED 2 SHOTS IN 24 HOURS 2 mL 11     Allergies   Allergen Reactions     No Known Drug Allergies      Tramadol      Anxiety      Recent Labs   Lab Test 03/10/21  0816 01/06/21  0830 03/03/20  1200 03/12/18  1617 02/24/18  0807 06/17/15  1207 12/29/14  1821   * 133*  --   --  109*   < >  --    HDL 32* 34*  --   --  41  --   --    TRIG 143 185*  --   --  125  --   --    ALT 33 40 66  --  35  --   --    CR  --  0.95 0.85  --  0.90  --  1.01   GFRESTIMATED  --  87 >90  --  87  --  77   GFRESTBLACK  --  >90 >90  --  >90  --  >90  African American GFR Calc     POTASSIUM  --  4.6 4.0  --  4.3  --  3.8   TSH  --   --   --  0.82  --   --  2.97    < > = values in this interval not displayed.        Reviewed and updated as needed this visit by clinical staff  Tobacco  Allergies  Meds  Problems  Med Hx  Surg Hx           Reviewed and updated as needed this visit by Provider                Past Medical History:   Diagnosis Date     Arthritis      Reyes's esophagus with esophagitis 2008     Esophageal reflux 11/27/2006     Gastro-oesophageal reflux disease      Migraine      Morbid obesity (H) 1/22/2009     Obesity, unspecified      Pure hypercholesterolemia      SPRAIN SHOULDER/ARM NOS  12/28/2005     Tear of medial cartilage or meniscus of Rt knee, current 10/17/2013     Unspecified essential hypertension       Past Surgical History:   Procedure Laterality Date     COLONOSCOPY  4/8/2013    Procedure: COLONOSCOPY;  Colonoscopy, Esophagogastroduodenoscopy with Single Biopsy;  Surgeon: Eddie Marshall MD;  Location: [facility]     ESOPHAGOSCOPY, GASTROSCOPY, DUODENOSCOPY (EGD), COMBINED  4/8/2013    Procedure: COMBINED ESOPHAGOSCOPY, GASTROSCOPY, DUODENOSCOPY (EGD), BIOPSY SINGLE OR MULTIPLE;;  Surgeon: Eddie Marshall MD;  Location:  [facility]     ESOPHAGOSCOPY, GASTROSCOPY, DUODENOSCOPY (EGD), COMBINED N/A 1/25/2017    Procedure: COMBINED ESOPHAGOSCOPY, GASTROSCOPY, DUODENOSCOPY (EGD), BIOPSY SINGLE OR MULTIPLE;  Surgeon: Kodi Ng MD;  Location:  GI     HC UGI ENDOSCOPY DIAG W BIOPSY  12/5/2008, 03/12/10     OPEN REDUCTION INTERNAL FIXATION WRIST Right 2/2016     REMOVE NAIL BED/FINGER TIP Left 5/2016     ROTATOR CUFF REPAIR RT/LT Right 3/2016     OB History   No obstetric history on file.       Review of Systems   Constitutional: Negative for chills and fever.   HENT: Negative for congestion, ear pain, hearing loss and sore throat.    Eyes: Negative for pain and visual disturbance.   Respiratory: Negative for cough and shortness of breath.    Cardiovascular: Negative for chest pain, palpitations and peripheral edema.   Gastrointestinal: Negative for abdominal pain, constipation, diarrhea, heartburn, hematochezia and nausea.   Genitourinary: Negative for discharge, dysuria, frequency, genital sores, hematuria, impotence and urgency.   Musculoskeletal: Positive for arthralgias and myalgias. Negative for joint swelling.   Skin: Negative for rash.   Neurological: Positive for headaches. Negative for dizziness, weakness and paresthesias.   Psychiatric/Behavioral: Negative for mood changes. The patient is not nervous/anxious.        OBJECTIVE:   /84   Pulse 89   Temp 97.9  F  "(36.6  C) (Temporal)   Ht 1.862 m (6' 1.3\")   Wt 137 kg (302 lb)   SpO2 98%   BMI 39.52 kg/m      Physical Exam  Constitutional:       Appearance: Normal appearance.   HENT:      Head: Normocephalic.      Right Ear: There is impacted cerumen.      Left Ear: Tympanic membrane normal.      Nose: Nose normal.      Mouth/Throat:      Mouth: Mucous membranes are moist.      Pharynx: Oropharynx is clear.   Eyes:      Extraocular Movements: Extraocular movements intact.      Pupils: Pupils are equal, round, and reactive to light.   Cardiovascular:      Rate and Rhythm: Normal rate and regular rhythm.      Heart sounds: Normal heart sounds.   Pulmonary:      Breath sounds: Normal breath sounds.   Abdominal:      General: Bowel sounds are normal.      Palpations: Abdomen is soft.   Musculoskeletal:         General: Tenderness present.      Cervical back: Normal range of motion and neck supple.      Comments: With resistance to external rotation on the left and with resistance to reverse throwing.  He is also tender along the bicipital groove.  No similar findings on the right.    Skin:     General: Skin is warm and dry.      Capillary Refill: Capillary refill takes less than 2 seconds.      Findings: Rash present.      Comments: Classic acne rosacea rash on the malar regions bilaterally, the nose and just starting over the mid brow region.   Neurological:      General: No focal deficit present.      Mental Status: He is alert.   Psychiatric:         Mood and Affect: Mood normal.         Diagnostic Test Results:  Labs reviewed in Epic  none     ASSESSMENT/PLAN:   (Z00.00) Routine general medical examination at a health care facility  (primary encounter diagnosis)  Comment: Routine physical exam with some new concerns noted below.  Plan: See below    (E78.5) Hyperlipidemia LDL goal <130  Comment: stable on medication.  Plan: pravastatin (PRAVACHOL) 40 MG tablet        Refills sent    (I10) Essential hypertension, " benign  Comment: stable on current dose.   Plan: lisinopril (ZESTRIL) 10 MG tablet, CANCELED:         Comprehensive metabolic panel (BMP + Alb, Alk         Phos, ALT, AST, Total. Bili, TP)        Refills sent    (G47.09) Other insomnia  Comment: minimal use of this for sleep  Plan: clonazePAM (KLONOPIN) 0.5 MG tablet        Refills sent.    (F41.1) SYDNEE (generalized anxiety disorder)  Comment: he has exacerbations of his anxiety at times and does use the clonazepam appropriately.   Plan: clonazePAM (KLONOPIN) 0.5 MG tablet        Refills sent.    (M25.512,  G89.29) Chronic left shoulder pain  Comment: worsening pain and exam is consistent with a possible Rotator cuff injury or labral tear.   Plan: Orthopedic  Referral, CANCELED: XR         Shoulder Left 2 Views        Start with xrays and MRI if completely normal.  He did want a referral to ortho too.      (Z13.1) Screening for diabetes mellitus  Comment: labs were normal for this  Plan: no new meds.     (Z23) Need for shingles vaccine  Comment: open to starting the shingles series.   Plan: ZOSTER VACCINE RECOMBINANT ADJUVANTED IM NJX,         ZOSTER VACCINE RECOMBINANT ADJUVANTED IM NJX        First dose now, second in 6-12 months.     (Z23) Need for prophylactic vaccination with tetanus-diphtheria (Td)  Comment: due for a Td booster  Plan: TD PRSERV FREE >=7 YRS ADS IM        Given today.     (L71.9) Rosacea  Comment: new diagnosis today.  Plan: metroNIDAZOLE (METROCREAM) 0.75 % external         cream        Will try metrocream and if no improvement, may need a dermatology referral.        Patient has been advised of split billing requirements and indicates understanding: Yes  COUNSELING:   Reviewed preventive health counseling, as reflected in patient instructions       Regular exercise       Healthy diet/nutrition       Vision screening       Immunizations    Vaccinated for: Td and Zoster             Alcohol Use     Estimated body mass index is 39.52  "kg/m  as calculated from the following:    Height as of this encounter: 1.862 m (6' 1.3\").    Weight as of this encounter: 137 kg (302 lb).     Weight management plan: patient is aware of weight and needs to increase physical activity and watch portion control.     He reports that he has never smoked. He has never used smokeless tobacco.      Counseling Resources:  ATP IV Guidelines  Pooled Cohorts Equation Calculator  FRAX Risk Assessment  ICSI Preventive Guidelines  Dietary Guidelines for Americans, 2010  USDA's MyPlate  ASA Prophylaxis  Lung CA Screening    Electronically signed by:  Maikol Allen M.D.  7/22/2021    "

## 2021-10-18 ENCOUNTER — ANCILLARY PROCEDURE (OUTPATIENT)
Dept: GENERAL RADIOLOGY | Facility: CLINIC | Age: 61
End: 2021-10-18
Attending: ORTHOPAEDIC SURGERY
Payer: COMMERCIAL

## 2021-10-18 ENCOUNTER — OFFICE VISIT (OUTPATIENT)
Dept: ORTHOPEDICS | Facility: CLINIC | Age: 61
End: 2021-10-18
Payer: COMMERCIAL

## 2021-10-18 VITALS
BODY MASS INDEX: 40.9 KG/M2 | HEIGHT: 73 IN | DIASTOLIC BLOOD PRESSURE: 120 MMHG | SYSTOLIC BLOOD PRESSURE: 140 MMHG | WEIGHT: 308.6 LBS

## 2021-10-18 DIAGNOSIS — M25.512 CHRONIC LEFT SHOULDER PAIN: Primary | ICD-10-CM

## 2021-10-18 DIAGNOSIS — G89.29 CHRONIC LEFT SHOULDER PAIN: Primary | ICD-10-CM

## 2021-10-18 DIAGNOSIS — M25.512 LEFT SHOULDER PAIN: ICD-10-CM

## 2021-10-18 PROCEDURE — 99204 OFFICE O/P NEW MOD 45 MIN: CPT | Performed by: ORTHOPAEDIC SURGERY

## 2021-10-18 PROCEDURE — 73030 X-RAY EXAM OF SHOULDER: CPT | Mod: TC | Performed by: RADIOLOGY

## 2021-10-18 ASSESSMENT — MIFFLIN-ST. JEOR: SCORE: 2268.44

## 2021-10-18 ASSESSMENT — PAIN SCALES - GENERAL: PAINLEVEL: NO PAIN (0)

## 2021-10-18 NOTE — LETTER
"    10/18/2021         RE: Ruddy Browning  [address] [place] [id number]        Dear Colleague,    Thank you for referring your patient, Ruddy Browning, to the [facility]. Please see a copy of my visit note below.    ORTHOPEDIC CONSULT      Chief Complaint: Ruddy Browning is a 61 year old male who is being seen for   Chief Complaint   Patient presents with     Shoulder Pain     left shoulder pain     Consult     ref: Dr. Allen       History of Present Illness:   Ruddy Browning is a 61 year old male who is seen in consultation at the request of Tiffany for evaluation of left shoulder pain.  Presents with his wife complaining of approximately 3 years worth of shoulder pain.  Worse when he reaches overhead.  Worse when he reaches across his body.  He feels like his arm will \"drop\" at times when he is lifting.  He has had no previous surgeries or injections that shoulder.  He did have a contralateral side rotator cuff repair and this feels similar.  Pain is located anterior lateral nonradiating rates it as moderate to severe at times.        Patient's past medical, surgical, social and family histories reviewed.     Past Medical History:   Diagnosis Date     Arthritis      Reyes's esophagus with esophagitis 2008     Esophageal reflux 11/27/2006     Gastro-oesophageal reflux disease      Migraine      Morbid obesity (H) 1/22/2009     Obesity, unspecified      Pure hypercholesterolemia      SPRAIN SHOULDER/ARM NOS 12/28/2005     Tear of medial cartilage or meniscus of Rt knee, current 10/17/2013     Unspecified essential hypertension        Past Surgical History:   Procedure Laterality Date     COLONOSCOPY  4/8/2013    Procedure: COLONOSCOPY;  Colonoscopy, Esophagogastroduodenoscopy with Single Biopsy;  Surgeon: Eddie Marshall MD;  [facility]     ESOPHAGOSCOPY, GASTROSCOPY, DUODENOSCOPY (EGD), COMBINED  4/8/2013    Procedure: COMBINED ESOPHAGOSCOPY, " GASTROSCOPY, DUODENOSCOPY (EGD), BIOPSY SINGLE OR MULTIPLE;;  Surgeon: Eddie aMrshall MD;  Location[facility]     ESOPHAGOSCOPY, GASTROSCOPY, DUODENOSCOPY (EGD), COMBINED N/A 1/25/2017    Procedure: COMBINED ESOPHAGOSCOPY, GASTROSCOPY, DUODENOSCOPY (EGD), BIOPSY SINGLE OR MULTIPLE;  Surgeon: Kodi Ng MD;  Location[facility]     [facility] UGI ENDOSCOPY DIAG W BIOPSY  12/5/2008, 03/12/10     OPEN REDUCTION INTERNAL FIXATION WRIST Right 2/2016     REMOVE NAIL BED/FINGER TIP Left 5/2016     ROTATOR CUFF REPAIR RT/LT Right 3/2016       Medications:  aspirin 81 MG tablet, ONE DAILY  Cholecalciferol (VITAMIN D3 PO), Take 1,000 Units by mouth daily  clonazePAM (KLONOPIN) 0.5 MG tablet, Take 1 tablet (0.5 mg) by mouth 2 times daily as needed for anxiety  lisinopril (ZESTRIL) 10 MG tablet, Take 1 tablet (10 mg) by mouth daily  metroNIDAZOLE (METROCREAM) 0.75 % external cream, Apply topically 2 times daily  MULTI VITAMIN MENS OR TABS, 1 tablet daily  omeprazole (PRILOSEC) 40 MG DR capsule, TAKE ONE CAPSULE BY MOUTH ONCE DAILY 30-60 MINUTES BEFORE MEALS  pravastatin (PRAVACHOL) 40 MG tablet, Take 1 tablet (40 mg) by mouth daily  cetirizine (ZYRTEC) 10 MG tablet, Take 1 tablet (10 mg) by mouth daily (Patient not taking: Reported on 10/18/2021)  ibuprofen (ADVIL/MOTRIN) 600 MG tablet, Take 1 tablet (600 mg) by mouth every 6 hours as needed for moderate pain (Patient not taking: Reported on 10/18/2021)  SUMAtriptan (IMITREX) 100 MG tablet, TAKE 1 TABLET BY MOUTH AT ONSET OF MIGRAINE. MAY REPEAT IN 2 HOURS IF HEADACHE IS NOT GONE. (MAXIMUM DOSE IS 200MG IN 24 HOURS) (Patient not taking: Reported on 10/18/2021)  SUMAtriptan Succinate Refill (IMITREX) 6 MG/0.5ML SOCT, INJECT 0.5ML FOR ONE DOSE. MAY REPEAT IN 1 HR, DO NOT EXCEED 2 SHOTS IN 24 HOURS (Patient not taking: Reported on 10/18/2021)    No current facility-administered medications on file prior to visit.      Allergies   Allergen Reactions     Tramadol      Anxiety   "      Social History     Occupational History     Occupation: [employer]     Comment: [employer].   Tobacco Use     Smoking status: Never Smoker     Smokeless tobacco: Never Used   Substance and Sexual Activity     Alcohol use: Yes     Alcohol/week: 0.0 standard drinks     Comment: rare     Drug use: No     Sexual activity: Yes     Partners: Female       Family History   Problem Relation Age of Onset     Heart Disease Father         alcohol dependant,  at age 52     Lipids Father      Hypertension Father      Cerebrovascular Disease Brother         had an aneurysm     Arthritis Sister         rheumatiod arthritis       REVIEW OF SYSTEMS  10 point review systems performed otherwise negative as noted as per history of present illness.    Physical Exam:  Vitals: BP (!) 140/120   Ht 1.862 m (6' 1.3\")   Wt 140 kg (308 lb 9.6 oz)   BMI 40.38 kg/m    BMI= Body mass index is 40.38 kg/m .  Constitutional: healthy, alert and no acute distress   Psychiatric: mentation appears normal and affect normal/bright  NEURO: no focal deficits  RESP: Normal with easy respirations and no use of accessory muscles to breathe, no audible wheezing or retractions  CV: LUE:   no edema         Regular rate and rhythm by palpation  SKIN: No erythema, rashes, excoriation, or breakdown. No evidence of infection.   JOINT/EXTREMITIES:left shoulder: Generalized protracted anterior rotated posture.  He has full forward elevation abduction and internal and external rotation.  He has weakness with supraspinatus and subscap testing.  Equivocal crossover.  Positive Celestin.  No focal areas of tenderness.  No soft tissue swelling.  No skin changes.    GAIT: not tested     Diagnostic Modalities:  left shoulder X-ray: Well preserved glenohumeral articular space. No fracture, dislocation and or lesion. , type II acromion, AC joint narrowing and irregularity   Independent visualization of the images was performed.      Impression: " "left Shoulder pain with weakness    Plan:  All of the above pertinent physical exam and imaging modalities findings was reviewed with Ruddy and his wife.    Based on his rotator cuff weakness and chronic pain we discussed proceeding with an MRI.  He is extremely claustrophobic.  He reports that \"I need to be knocked out\".  Typical Valium pre-MRI does not work for him as he is tried that in the past.  With that in mind and his anxiety we discussed proceeding with left shoulder ultrasound.  He verbalized understanding.    Return to clinic to discuss test results, or sooner as needed for changes.  Re-x-ray on return: No    Santhosh Taylor D.O.    Ruddy to follow up with Primary Care provider regarding elevated blood pressure.    Rubi/HUBERT       Again, thank you for allowing me to participate in the care of your patient.        Sincerely,        Jordan Taylor, DO    "

## 2021-10-18 NOTE — PROGRESS NOTES
"ORTHOPEDIC CONSULT      Chief Complaint: Ruddy Browning is a 61 year old male who is being seen for   Chief Complaint   Patient presents with     Shoulder Pain     left shoulder pain     Consult     ref: Dr. Allen       History of Present Illness:   Ruddy Browning is a 61 year old male who is seen in consultation at the request of Tiffany for evaluation of left shoulder pain.  Presents with his wife complaining of approximately 3 years worth of shoulder pain.  Worse when he reaches overhead.  Worse when he reaches across his body.  He feels like his arm will \"drop\" at times when he is lifting.  He has had no previous surgeries or injections that shoulder.  He did have a contralateral side rotator cuff repair and this feels similar.  Pain is located anterior lateral nonradiating rates it as moderate to severe at times.        Patient's past medical, surgical, social and family histories reviewed.     Past Medical History:   Diagnosis Date     Arthritis      Reyes's esophagus with esophagitis 2008     Esophageal reflux 11/27/2006     Gastro-oesophageal reflux disease      Migraine      Morbid obesity (H) 1/22/2009     Obesity, unspecified      Pure hypercholesterolemia      SPRAIN SHOULDER/ARM NOS 12/28/2005     Tear of medial cartilage or meniscus of Rt knee, current 10/17/2013     Unspecified essential hypertension        Past Surgical History:   Procedure Laterality Date     COLONOSCOPY  4/8/2013    Procedure: COLONOSCOPY;  Colonoscopy, Esophagogastroduodenoscopy with Single Biopsy;  Surgeon: Eddie Marshall MD;  Location: [facility]     ESOPHAGOSCOPY, GASTROSCOPY, DUODENOSCOPY (EGD), COMBINED  4/8/2013    Procedure: COMBINED ESOPHAGOSCOPY, GASTROSCOPY, DUODENOSCOPY (EGD), BIOPSY SINGLE OR MULTIPLE;;  Surgeon: Eddie Marshall MD;  Location: [facility]     ESOPHAGOSCOPY, GASTROSCOPY, DUODENOSCOPY (EGD), COMBINED N/A 1/25/2017    Procedure: COMBINED ESOPHAGOSCOPY, GASTROSCOPY, DUODENOSCOPY (EGD), BIOPSY " SINGLE OR MULTIPLE;  Surgeon: Kodi Ng MD;  Location:  GI      UGI ENDOSCOPY DIAG W BIOPSY  12/5/2008, 03/12/10     OPEN REDUCTION INTERNAL FIXATION WRIST Right 2/2016     REMOVE NAIL BED/FINGER TIP Left 5/2016     ROTATOR CUFF REPAIR RT/LT Right 3/2016       Medications:  aspirin 81 MG tablet, ONE DAILY  Cholecalciferol (VITAMIN D3 PO), Take 1,000 Units by mouth daily  clonazePAM (KLONOPIN) 0.5 MG tablet, Take 1 tablet (0.5 mg) by mouth 2 times daily as needed for anxiety  lisinopril (ZESTRIL) 10 MG tablet, Take 1 tablet (10 mg) by mouth daily  metroNIDAZOLE (METROCREAM) 0.75 % external cream, Apply topically 2 times daily  MULTI VITAMIN MENS OR TABS, 1 tablet daily  omeprazole (PRILOSEC) 40 MG DR capsule, TAKE ONE CAPSULE BY MOUTH ONCE DAILY 30-60 MINUTES BEFORE MEALS  pravastatin (PRAVACHOL) 40 MG tablet, Take 1 tablet (40 mg) by mouth daily  cetirizine (ZYRTEC) 10 MG tablet, Take 1 tablet (10 mg) by mouth daily (Patient not taking: Reported on 10/18/2021)  ibuprofen (ADVIL/MOTRIN) 600 MG tablet, Take 1 tablet (600 mg) by mouth every 6 hours as needed for moderate pain (Patient not taking: Reported on 10/18/2021)  SUMAtriptan (IMITREX) 100 MG tablet, TAKE 1 TABLET BY MOUTH AT ONSET OF MIGRAINE. MAY REPEAT IN 2 HOURS IF HEADACHE IS NOT GONE. (MAXIMUM DOSE IS 200MG IN 24 HOURS) (Patient not taking: Reported on 10/18/2021)  SUMAtriptan Succinate Refill (IMITREX) 6 MG/0.5ML SOCT, INJECT 0.5ML FOR ONE DOSE. MAY REPEAT IN 1 HR, DO NOT EXCEED 2 SHOTS IN 24 HOURS (Patient not taking: Reported on 10/18/2021)    No current facility-administered medications on file prior to visit.      Allergies   Allergen Reactions     Tramadol      Anxiety        Social History     Occupational History     Occupation: [employer]     Comment: [employer].   Tobacco Use     Smoking status: Never Smoker     Smokeless tobacco: Never Used   Substance and Sexual Activity     Alcohol use: Yes     Alcohol/week:  "0.0 standard drinks     Comment: rare     Drug use: No     Sexual activity: Yes     Partners: Female       Family History   Problem Relation Age of Onset     Heart Disease Father         alcohol dependant,  at age 52     Lipids Father      Hypertension Father      Cerebrovascular Disease Brother         had an aneurysm     Arthritis Sister         rheumatiod arthritis       REVIEW OF SYSTEMS  10 point review systems performed otherwise negative as noted as per history of present illness.    Physical Exam:  Vitals: BP (!) 140/120   Ht 1.862 m (6' 1.3\")   Wt 140 kg (308 lb 9.6 oz)   BMI 40.38 kg/m    BMI= Body mass index is 40.38 kg/m .  Constitutional: healthy, alert and no acute distress   Psychiatric: mentation appears normal and affect normal/bright  NEURO: no focal deficits  RESP: Normal with easy respirations and no use of accessory muscles to breathe, no audible wheezing or retractions  CV: LUE:   no edema         Regular rate and rhythm by palpation  SKIN: No erythema, rashes, excoriation, or breakdown. No evidence of infection.   JOINT/EXTREMITIES:left shoulder: Generalized protracted anterior rotated posture.  He has full forward elevation abduction and internal and external rotation.  He has weakness with supraspinatus and subscap testing.  Equivocal crossover.  Positive Celestin.  No focal areas of tenderness.  No soft tissue swelling.  No skin changes.    GAIT: not tested     Diagnostic Modalities:  left shoulder X-ray: Well preserved glenohumeral articular space. No fracture, dislocation and or lesion. , type II acromion, AC joint narrowing and irregularity   Independent visualization of the images was performed.      Impression: left Shoulder pain with weakness    Plan:  All of the above pertinent physical exam and imaging modalities findings was reviewed with Ruddy and his wife.    Based on his rotator cuff weakness and chronic pain we discussed proceeding with an MRI.  He is extremely " "claustrophobic.  He reports that \"I need to be knocked out\".  Typical Valium pre-MRI does not work for him as he is tried that in the past.  With that in mind and his anxiety we discussed proceeding with left shoulder ultrasound.  He verbalized understanding.    Return to clinic to discuss test results, or sooner as needed for changes.  Re-x-ray on return: No    Santhosh Taylor D.O.  "

## 2021-10-18 NOTE — PROGRESS NOTES
Pt notified via phone to call [phone] to schedule the ultrasound and then follow up 2-3 days after the ultrasound.   Rubi/HUBERT

## 2021-10-19 DIAGNOSIS — K22.70 BARRETT'S ESOPHAGUS WITHOUT DYSPLASIA: ICD-10-CM

## 2021-10-20 RX ORDER — OMEPRAZOLE 40 MG/1
CAPSULE, DELAYED RELEASE ORAL
Qty: 90 CAPSULE | Refills: 1 | Status: SHIPPED | OUTPATIENT
Start: 2021-10-20 | End: 2022-03-22

## 2021-10-20 NOTE — TELEPHONE ENCOUNTER
Prescription approved per [facility] Refill Protocol.    Crystal Martin RN

## 2021-10-23 ENCOUNTER — HEALTH MAINTENANCE LETTER (OUTPATIENT)
Age: 61
End: 2021-10-23

## 2021-10-28 ENCOUNTER — ANCILLARY PROCEDURE (OUTPATIENT)
Dept: ULTRASOUND IMAGING | Facility: CLINIC | Age: 61
End: 2021-10-28
Attending: ORTHOPAEDIC SURGERY
Payer: COMMERCIAL

## 2021-10-28 DIAGNOSIS — G89.29 CHRONIC LEFT SHOULDER PAIN: ICD-10-CM

## 2021-10-28 DIAGNOSIS — M25.512 CHRONIC LEFT SHOULDER PAIN: ICD-10-CM

## 2021-10-28 PROCEDURE — 76881 US COMPL JOINT R-T W/IMG: CPT | Performed by: RADIOLOGY

## 2021-11-01 ENCOUNTER — OFFICE VISIT (OUTPATIENT)
Dept: ORTHOPEDICS | Facility: CLINIC | Age: 61
End: 2021-11-01
Payer: COMMERCIAL

## 2021-11-01 VITALS
DIASTOLIC BLOOD PRESSURE: 80 MMHG | BODY MASS INDEX: 40.9 KG/M2 | SYSTOLIC BLOOD PRESSURE: 124 MMHG | HEIGHT: 73 IN | WEIGHT: 308.6 LBS

## 2021-11-01 DIAGNOSIS — M75.112 NONTRAUMATIC INCOMPLETE TEAR OF LEFT ROTATOR CUFF: Primary | ICD-10-CM

## 2021-11-01 DIAGNOSIS — M75.42 SUBACROMIAL IMPINGEMENT, LEFT: ICD-10-CM

## 2021-11-01 PROCEDURE — 99213 OFFICE O/P EST LOW 20 MIN: CPT | Mod: 25 | Performed by: ORTHOPAEDIC SURGERY

## 2021-11-01 PROCEDURE — 20610 DRAIN/INJ JOINT/BURSA W/O US: CPT | Mod: LT | Performed by: ORTHOPAEDIC SURGERY

## 2021-11-01 RX ORDER — TRIAMCINOLONE ACETONIDE 40 MG/ML
40 INJECTION, SUSPENSION INTRA-ARTICULAR; INTRAMUSCULAR ONCE
Status: COMPLETED | OUTPATIENT
Start: 2021-11-01 | End: 2021-11-01

## 2021-11-01 RX ORDER — BUPIVACAINE HYDROCHLORIDE 5 MG/ML
3 INJECTION, SOLUTION PERINEURAL ONCE
Status: COMPLETED | OUTPATIENT
Start: 2021-11-01 | End: 2021-11-01

## 2021-11-01 RX ADMIN — BUPIVACAINE HYDROCHLORIDE 15 MG: 5 INJECTION, SOLUTION PERINEURAL at 08:03

## 2021-11-01 RX ADMIN — TRIAMCINOLONE ACETONIDE 40 MG: 40 INJECTION, SUSPENSION INTRA-ARTICULAR; INTRAMUSCULAR at 08:03

## 2021-11-01 ASSESSMENT — PAIN SCALES - GENERAL: PAINLEVEL: NO PAIN (0)

## 2021-11-01 ASSESSMENT — MIFFLIN-ST. JEOR: SCORE: 2268.44

## 2021-11-01 NOTE — LETTER
"    11/1/2021         RE: Ruddy Browning  [address] [facility] [id number]        Dear Colleague,    Thank you for referring your patient, Ruddy Browning, to the [facility]. Please see a copy of my visit note below.    Office Visit-Follow up    Chief Complaint: Ruddy Browning is a 61 year old male who is being seen for   Chief Complaint   Patient presents with     RECHECK     mri results of left shoulder       History of Present Illness:   Today's visit:  Returns for the MRI results to his left shoulder.      October 18, 2021 visit:  Ruddy Browning is a 61 year old male who is seen in consultation at the request of Tiffany for evaluation of left shoulder pain.  Presents with his wife complaining of approximately 3 years worth of shoulder pain.  Worse when he reaches overhead.  Worse when he reaches across his body.  He feels like his arm will \"drop\" at times when he is lifting.  He has had no previous surgeries or injections that shoulder.  He did have a contralateral side rotator cuff repair and this feels similar.  Pain is located anterior lateral nonradiating rates it as moderate to severe at times.         Social History     Occupational History     Occupation: [employer]     Comment: [employer].   Tobacco Use     Smoking status: Never Smoker     Smokeless tobacco: Never Used   Substance and Sexual Activity     Alcohol use: Yes     Alcohol/week: 0.0 standard drinks     Comment: rare     Drug use: No     Sexual activity: Yes     Partners: Female           Physical Exam:  Vitals: /80   Ht 1.862 m (6' 1.3\")   Wt 140 kg (308 lb 9.6 oz)   BMI 40.38 kg/m    BMI= Body mass index is 40.38 kg/m .  Constitutional: healthy, alert and no acute distress   Psychiatric: mentation appears normal and affect normal/bright  NEURO: no focal deficits  RESP: Normal with easy respirations and no use of accessory muscles to breathe, no audible wheezing or " retractions  CV: LUE:   no edema           SKIN: No erythema, rashes, excoriation, or breakdown. No evidence of infection.   JOINT/EXTREMITIES:left shoulder: Protracted posture with forward head position no pain with supraspinatus testing today.  GAIT: not tested             Diagnostic Modalities:  Left shoulder ultrasound:Full-thickness or near full-thickness tearing of the anterior  supraspinatus at the footprint.    Independent visualization of the images was performed.      Impression: left shoulder subacromial impingement with near full-thickness tear anterior supraspinatus    Plan:  All of the above pertinent physical exam and imaging modalities findings was reviewed with Ruddy and his wife.                                          INJECTION PROCEDURE:  The patient was counseled about an  injection, including discussion of risks (including infection), contents of the injection, rationale for performing the injection, and expected benefits of the injection. The skin was prepped with alcohol and betadine and then utilizing sterile technique an injection of the left shoulder subacromial space from the posterolateral approach  was performed. The injection consisted 1ml of Kenalog (40mg per 1 ml) mixed with 3ml of 0.5% Marcaine. The patient tolerated the injection well, and there were no complications. The injection site was covered with a Band-Aid. The injection was performed by Paulo Olson, APRN, CNP, DNP    Options discussed.  Clinical exam has no pain in supraspinatus testing today.  Ultrasound shows near full-thickness of the anterior aspect.  I recommend a trial of conservative care including an injection today.  We will importantly have recommends physical therapy to help correct his underlying posture to see if that helps improve his discomfort.  All questions were answered.      Return to clinic 6, week(s), or sooner as needed for changes.  Re-x-ray on return: No    Santhosh Taylor D.O.            Again, thank  you for allowing me to participate in the care of your patient.        Sincerely,        Jordan Taylor, DO

## 2021-11-01 NOTE — PROGRESS NOTES
"Office Visit-Follow up    Chief Complaint: Ruddy Browning is a 61 year old male who is being seen for   Chief Complaint   Patient presents with     RECHECK     mri results of left shoulder       History of Present Illness:   Today's visit:  Returns for the MRI results to his left shoulder.      October 18, 2021 visit:  Ruddy Browning is a 61 year old male who is seen in consultation at the request of Tiffany for evaluation of left shoulder pain.  Presents with his wife complaining of approximately 3 years worth of shoulder pain.  Worse when he reaches overhead.  Worse when he reaches across his body.  He feels like his arm will \"drop\" at times when he is lifting.  He has had no previous surgeries or injections that shoulder.  He did have a contralateral side rotator cuff repair and this feels similar.  Pain is located anterior lateral nonradiating rates it as moderate to severe at times.         Social History     Occupational History     Occupation: [employer]     Comment: [employer].   Tobacco Use     Smoking status: Never Smoker     Smokeless tobacco: Never Used   Substance and Sexual Activity     Alcohol use: Yes     Alcohol/week: 0.0 standard drinks     Comment: rare     Drug use: No     Sexual activity: Yes     Partners: Female           Physical Exam:  Vitals: /80   Ht 1.862 m (6' 1.3\")   Wt 140 kg (308 lb 9.6 oz)   BMI 40.38 kg/m    BMI= Body mass index is 40.38 kg/m .  Constitutional: healthy, alert and no acute distress   Psychiatric: mentation appears normal and affect normal/bright  NEURO: no focal deficits  RESP: Normal with easy respirations and no use of accessory muscles to breathe, no audible wheezing or retractions  CV: LUE:   no edema           SKIN: No erythema, rashes, excoriation, or breakdown. No evidence of infection.   JOINT/EXTREMITIES:left shoulder: Protracted posture with forward head position no pain with supraspinatus testing today.  GAIT: not tested "             Diagnostic Modalities:  Left shoulder ultrasound:Full-thickness or near full-thickness tearing of the anterior  supraspinatus at the footprint.    Independent visualization of the images was performed.      Impression: left shoulder subacromial impingement with near full-thickness tear anterior supraspinatus    Plan:  All of the above pertinent physical exam and imaging modalities findings was reviewed with Ruddy and his wife.                                          INJECTION PROCEDURE:  The patient was counseled about an  injection, including discussion of risks (including infection), contents of the injection, rationale for performing the injection, and expected benefits of the injection. The skin was prepped with alcohol and betadine and then utilizing sterile technique an injection of the left shoulder subacromial space from the posterolateral approach  was performed. The injection consisted 1ml of Kenalog (40mg per 1 ml) mixed with 3ml of 0.5% Marcaine. The patient tolerated the injection well, and there were no complications. The injection site was covered with a Band-Aid. The injection was performed by Paulo Olson, APRN, CNP, DNP    Options discussed.  Clinical exam has no pain in supraspinatus testing today.  Ultrasound shows near full-thickness of the anterior aspect.  I recommend a trial of conservative care including an injection today.  We will importantly have recommends physical therapy to help correct his underlying posture to see if that helps improve his discomfort.  All questions were answered.      Return to clinic 6, week(s), or sooner as needed for changes.  Re-x-ray on return: No    Santhosh Taylor D.O.

## 2021-11-04 ENCOUNTER — HOSPITAL ENCOUNTER (OUTPATIENT)
Dept: PHYSICAL THERAPY | Facility: CLINIC | Age: 61
Setting detail: THERAPIES SERIES
End: 2021-11-04
Attending: ORTHOPAEDIC SURGERY
Payer: COMMERCIAL

## 2021-11-04 DIAGNOSIS — M75.112 NONTRAUMATIC INCOMPLETE TEAR OF LEFT ROTATOR CUFF: ICD-10-CM

## 2021-11-04 DIAGNOSIS — M75.42 SUBACROMIAL IMPINGEMENT, LEFT: ICD-10-CM

## 2021-11-04 PROCEDURE — 97110 THERAPEUTIC EXERCISES: CPT | Mod: GP | Performed by: PHYSICAL THERAPIST

## 2021-11-04 PROCEDURE — 97161 PT EVAL LOW COMPLEX 20 MIN: CPT | Mod: GP | Performed by: PHYSICAL THERAPIST

## 2021-11-04 NOTE — PROGRESS NOTES
11/04/21 1400   General Information   Type of Visit Initial OP Ortho PT Evaluation   Start of Care Date 11/04/21   Referring Physician Dr. salazar   Orders Evaluate and Treat   Date of Order 11/01/21   Certification Required? No   Medical Diagnosis L non-traumatic RC tear.     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  Pt notes certain motions will create a catch.  Injection performed on 11/1/21.  Pt notes positive response to injection with reduced pain and improved function.  PMH: tear of medial cartilage of R knee, morbid obesity, arthritis, migraine, hypercholesterolemia, HTN, R RCR 2016, ORIF R wrist, peripheral neuropathy, anxiety.   Impairments A. Pain;D. Decreased ROM;E. Decreased flexibility;F. Decreased strength and endurance   Functional Limitations perform activities of daily living;perform desired leisure / sports activities   Symptom Location L anterior shoulder.   How/Where did it occur From insidious onset   Onset date of current episode/exacerbation 11/01/18   Chronicity Chronic   Pain rating (0-10 point scale) Best (/10);Worst (/10)   Best (/10) 0/10   Worst (/10) 3/10   Pain quality A. Sharp   Frequency of pain/symptoms C. With activity   Pain/symptoms are: The same all the time   Pain/symptoms exacerbated by C. Lifting;D. Carrying;G. Certain positions;K. Home tasks   Pain/symptoms eased by C. Rest;E. Changing positions;F. Certain positions   Progression of symptoms since onset: Improved   Current / Previous Interventions   Diagnostic Tests: Other   Other diagnostic  tests Left shoulder ultrasound: Full-thickness or near full-thickness tearing of the anterior supraspinatus   Current Level of Function   Current Community Support Family/friend caregiver   Patient role/employment history A. Employed   Employment Comments owner of food [email or web address]   Living environment House/townhome   Home/community accessibility no concerns   Current equipment-Gait/Locomotion None   Current equipment-ADL None   Fall Risk Screen   Fall screen completed by PT   Have you fallen 2 or more times in the past year? No   Have you fallen and had an injury in the past year? No   Abuse Screen (yes response referral indicated)   Feels Unsafe at Home or Work/School no   Feels Threatened by Someone no   Does Anyone Try to Keep You From Having Contact with Others or Doing Things Outside Your Home? no   Physical Signs of Abuse Present no   Shoulder Objective Findings   Side (if bilateral, select both right and left) Left   Neer's Test Negative   Celestin-Efrem Test Positive   Crossover Test Negative   Left Shoulder Flexion AROM 155   Left Shoulder Abduction AROM 155   Left Shoulder ER AROM T4   Left Shoulder IR AROM T7   Left Shoulder Flexion Strength 4/5   Left Shoulder Abduction Strength 4/5   Left Shoulder ER Strength 4/5   Left Shoulder IR Strength 5/5   Left Shoulder Extension Strength 5/5   Left Mid Trapezius Strength 4/5   Left Lower Trapezius Strength 4/5   Planned Therapy Interventions   Planned Therapy Interventions joint mobilization;manual therapy;neuromuscular re-education;ROM;strengthening;stretching   Planned Modality Interventions   Planned Modality Interventions Cryotherapy;Electrical stimulation;Hot packs;TENS;Ultrasound   Clinical Impression   Criteria for Skilled Therapeutic Interventions Met yes, treatment indicated   PT Diagnosis L shoulder pain   Influenced by the following impairments Pain, decreased ROM, weakness, decreased proprioception.   Functional limitations due to impairments Lifting,  reaching, carrying.   Clinical Presentation Stable/Uncomplicated   Clinical Presentation Rationale Clinical judgement   Clinical Decision Making (Complexity) Low complexity   Therapy Frequency 1 time/week   Predicted Duration of Therapy Intervention (days/wks) 6 weeks   Risk & Benefits of therapy have been explained Yes   Patient, Family & other staff in agreement with plan of care Yes   Clinical Impression Comments Pt is a 61 y.o. male who presented to PT with symptoms of L shoulder pain.   Education Assessment   Preferred Learning Style Listening;Demonstration;Pictures/video   Barriers to Learning No barriers   ORTHO GOALS   PT Ortho Eval Goals 1;2   Ortho Goal 1   Goal Identifier SPADI   Goal Description Pt will demonstrate 10% improvement per SPADI in order to demonstrate functional improvement of L shoulder.   Target Date 12/16/21   Ortho Goal 2   Goal Identifier HEP   Goal Description Pt will be independent with HEP in order to improve L shoulder ROM, strength, and proprioception.   Target Date 12/16/21   Total Evaluation Time   PT Nina Low Complexity Minutes (73241) 15

## 2021-11-15 ENCOUNTER — HOSPITAL ENCOUNTER (OUTPATIENT)
Dept: PHYSICAL THERAPY | Facility: CLINIC | Age: 61
Setting detail: THERAPIES SERIES
End: 2021-11-15
Attending: ORTHOPAEDIC SURGERY
Payer: COMMERCIAL

## 2021-11-15 PROCEDURE — 97110 THERAPEUTIC EXERCISES: CPT | Mod: GP | Performed by: PHYSICAL THERAPIST

## 2021-11-15 NOTE — PROGRESS NOTES
Outpatient Physical Therapy Discharge Note     Patient: Ruddy Browning  : 1960    Beginning/End Dates of Reporting Period:  21 to 11/15/21    Referring Provider: Dr. Taylor    Therapy Diagnosis: L shoulder pain.     Client Self Report: Pt reports only minimal pain with ER exercise but otherwise no pain since last session.  Pt notes that he was able to chop and haul wood over the weekend with no increase in shoulder pain.      Objective Measurements:  Shoulder flexion: 170  Shoulder abduction: 160           Outcome Measures (most recent score):  SPADI: 2.31    Goals:  Goal Identifier SPADI   Goal Description Pt will demonstrate 10% improvement per SPADI in order to demonstrate functional improvement of L shoulder.   Target Date 21   Date Met   11/15/21   Progress (detail required for progress note):       Goal Identifier HEP   Goal Description Pt will be independent with HEP in order to improve L shoulder ROM, strength, and proprioception.   Target Date 21   Date Met   11/15/21   Progress (detail required for progress note):         Plan:  Discharge from therapy.    Discharge:    Reason for Discharge: Patient has met all goals.    Equipment Issued: none    Discharge Plan: Patient to continue home program.

## 2022-03-14 DIAGNOSIS — G47.09 OTHER INSOMNIA: ICD-10-CM

## 2022-03-14 DIAGNOSIS — F41.1 GAD (GENERALIZED ANXIETY DISORDER): ICD-10-CM

## 2022-03-15 NOTE — TELEPHONE ENCOUNTER
clonazePAM (KLONOPIN) 0.5 MG tablet     Last Written Prescription Date:  07/22/21  Last Fill Quantity: 60,   # refills: 5  Last Office Visit: 07/22/21  Future Office visit:       Routing refill request to provider for review/approval because:  Drug not on the [facility], P or [facility] refill protocol or controlled substance

## 2022-03-16 RX ORDER — CLONAZEPAM 0.5 MG/1
TABLET ORAL
Qty: 60 TABLET | Refills: 5 | Status: SHIPPED | OUTPATIENT
Start: 2022-03-16 | End: 2022-10-10

## 2022-03-20 DIAGNOSIS — F41.1 GAD (GENERALIZED ANXIETY DISORDER): ICD-10-CM

## 2022-03-20 DIAGNOSIS — G47.09 OTHER INSOMNIA: ICD-10-CM

## 2022-03-21 DIAGNOSIS — K22.70 BARRETT'S ESOPHAGUS WITHOUT DYSPLASIA: ICD-10-CM

## 2022-03-21 DIAGNOSIS — F41.1 GAD (GENERALIZED ANXIETY DISORDER): ICD-10-CM

## 2022-03-21 DIAGNOSIS — G47.09 OTHER INSOMNIA: ICD-10-CM

## 2022-03-21 RX ORDER — CLONAZEPAM 0.5 MG/1
TABLET ORAL
Qty: 60 TABLET | Refills: 5 | OUTPATIENT
Start: 2022-03-21

## 2022-03-21 NOTE — TELEPHONE ENCOUNTER
Rx was sent 3/16/22 for 60 tabs and 5 refills. Patient should have medication available at pharmacy.   Pharmacy notified via E-prescribe refusal

## 2022-03-22 RX ORDER — OMEPRAZOLE 40 MG/1
CAPSULE, DELAYED RELEASE ORAL
Qty: 90 CAPSULE | Refills: 1 | Status: SHIPPED | OUTPATIENT
Start: 2022-03-22 | End: 2022-09-13

## 2022-08-12 DIAGNOSIS — I10 BENIGN ESSENTIAL HYPERTENSION: Primary | ICD-10-CM

## 2022-08-12 DIAGNOSIS — E78.5 HYPERLIPIDEMIA LDL GOAL <130: ICD-10-CM

## 2022-08-16 NOTE — TELEPHONE ENCOUNTER
Patient informed via mychart to schedule, 8/19 reminder if not read to send letter.   Elen Garrett MA

## 2022-08-16 NOTE — TELEPHONE ENCOUNTER
Pending Prescriptions:                       Disp   Refills    pravastatin (PRAVACHOL) 40 MG tablet [Phar*90 tab*3        Sig: TAKE ONE TABLET BY MOUTH ONCE DAILY    Routing refill request to provider for review/approval because:  Labs not current:  LDL  Patient needs to be seen because it has been more than 1 year since last office visit.    Sending to scheduling for yearly office visit due    Lin Chang RN

## 2022-08-23 RX ORDER — PRAVASTATIN SODIUM 40 MG
TABLET ORAL
Qty: 90 TABLET | Refills: 3 | Status: SHIPPED | OUTPATIENT
Start: 2022-08-23 | End: 2023-03-23

## 2022-08-23 NOTE — TELEPHONE ENCOUNTER
Patient is due for his annual visit and lab work with me.  Please get this scheduled, I approved his medications.    Electronically signed by:  Maikol Allen M.D.  8/23/2022

## 2022-08-25 DIAGNOSIS — Z13.220 LIPID SCREENING: Primary | ICD-10-CM

## 2022-08-25 DIAGNOSIS — I10 ESSENTIAL HYPERTENSION, BENIGN: ICD-10-CM

## 2022-08-29 NOTE — TELEPHONE ENCOUNTER
"Requested Prescriptions   Pending Prescriptions Disp Refills    lisinopril (ZESTRIL) 10 MG tablet [Pharmacy Med Name: LISINOPRIL 10MG TABS] 90 tablet 3     Sig: TAKE ONE TABLET BY MOUTH ONCE DAILY        ACE Inhibitors (Including Combos) Protocol Failed - 8/25/2022  1:05 AM        Failed - Recent (12 mo) or future (30 days) visit within the authorizing provider's specialty     Patient has had an office visit with the authorizing provider or a provider within the authorizing providers department within the previous 12 mos or has a future within next 30 days. See \"Patient Info\" tab in inbasket, or \"Choose Columns\" in Meds & Orders section of the refill encounter.              Failed - Normal serum creatinine on file in past 12 months     Recent Labs   Lab Test 01/06/21  0830   CR 0.95       Ok to refill medication if creatinine is low          Failed - Normal serum potassium on file in past 12 months     Recent Labs   Lab Test 01/06/21  0830   POTASSIUM 4.6               Passed - Blood pressure under 140/90 in past 12 months       BP Readings from Last 3 Encounters:   11/01/21 124/80   10/18/21 (!) 140/120   07/22/21 138/84                 Passed - Medication is active on med list        Passed - Patient is age 18 or older              DARLENE LovelaceN, RN     "

## 2022-08-31 RX ORDER — LISINOPRIL 10 MG/1
TABLET ORAL
Qty: 90 TABLET | Refills: 3 | Status: SHIPPED | OUTPATIENT
Start: 2022-08-31 | End: 2022-12-01

## 2022-08-31 NOTE — TELEPHONE ENCOUNTER
Medication was approved.  I did place labs for him to do at his convenience.  I see he has an appointment with me on December 1.  He can do the labs a week or so prior to his appointment if that works for him.    Electronically signed by:  Maikol Allen M.D.  8/31/2022

## 2022-09-10 DIAGNOSIS — K22.70 BARRETT'S ESOPHAGUS WITHOUT DYSPLASIA: ICD-10-CM

## 2022-09-13 RX ORDER — OMEPRAZOLE 40 MG/1
CAPSULE, DELAYED RELEASE ORAL
Qty: 90 CAPSULE | Refills: 0 | Status: SHIPPED | OUTPATIENT
Start: 2022-09-13 | End: 2022-12-13

## 2022-09-13 NOTE — TELEPHONE ENCOUNTER
prilosec  Medication is being filled for 1 time refill only due to:  Patient needs to be seen because it has been more than one year since last visit.   Office visit is scheduled for further refills.

## 2022-10-09 ENCOUNTER — HEALTH MAINTENANCE LETTER (OUTPATIENT)
Age: 62
End: 2022-10-09

## 2022-10-09 DIAGNOSIS — F41.1 GAD (GENERALIZED ANXIETY DISORDER): ICD-10-CM

## 2022-10-09 DIAGNOSIS — G47.09 OTHER INSOMNIA: ICD-10-CM

## 2022-10-10 RX ORDER — CLONAZEPAM 0.5 MG/1
TABLET ORAL
Qty: 60 TABLET | Refills: 5 | Status: SHIPPED | OUTPATIENT
Start: 2022-10-10 | End: 2023-05-19

## 2022-10-10 NOTE — TELEPHONE ENCOUNTER
Requested Prescriptions   Pending Prescriptions Disp Refills    clonazePAM (KLONOPIN) 0.5 MG tablet [Pharmacy Med Name: CLONAZEPAM 0.5MG TABS] 60 tablet 5     Sig: TAKE ONE TABLET BY MOUTH TWICE A DAY AS NEEDED FOR ANXIETY       There is no refill protocol information for this order          Rachel Shaffer, DARLENEN, RN

## 2022-11-24 ASSESSMENT — ENCOUNTER SYMPTOMS: HEMATOCHEZIA: 1

## 2022-11-29 ENCOUNTER — LAB (OUTPATIENT)
Dept: LAB | Facility: CLINIC | Age: 62
End: 2022-11-29
Payer: COMMERCIAL

## 2022-11-29 DIAGNOSIS — Z13.220 LIPID SCREENING: ICD-10-CM

## 2022-11-29 DIAGNOSIS — I10 BENIGN ESSENTIAL HYPERTENSION: ICD-10-CM

## 2022-11-29 DIAGNOSIS — I10 ESSENTIAL HYPERTENSION, BENIGN: ICD-10-CM

## 2022-11-29 DIAGNOSIS — E78.5 HYPERLIPIDEMIA LDL GOAL <130: ICD-10-CM

## 2022-11-29 LAB
ALBUMIN SERPL-MCNC: 4 G/DL (ref 3.4–5)
ALP SERPL-CCNC: 57 U/L (ref 40–150)
ALT SERPL W P-5'-P-CCNC: 42 U/L (ref 0–70)
ANION GAP SERPL CALCULATED.3IONS-SCNC: <1 MMOL/L (ref 3–14)
AST SERPL W P-5'-P-CCNC: 24 U/L (ref 0–45)
BILIRUB SERPL-MCNC: 0.5 MG/DL (ref 0.2–1.3)
BUN SERPL-MCNC: 19 MG/DL (ref 7–30)
CALCIUM SERPL-MCNC: 8.6 MG/DL (ref 8.5–10.1)
CHLORIDE BLD-SCNC: 111 MMOL/L (ref 94–109)
CHOLEST SERPL-MCNC: 181 MG/DL
CO2 SERPL-SCNC: 31 MMOL/L (ref 20–32)
CREAT SERPL-MCNC: 0.95 MG/DL (ref 0.66–1.25)
CREAT UR-MCNC: 225 MG/DL
FASTING STATUS PATIENT QL REPORTED: YES
GFR SERPL CREATININE-BSD FRML MDRD: >90 ML/MIN/1.73M2
GLUCOSE BLD-MCNC: 116 MG/DL (ref 70–99)
HDLC SERPL-MCNC: 37 MG/DL
LDLC SERPL CALC-MCNC: 112 MG/DL
MICROALBUMIN UR-MCNC: 30 MG/L
MICROALBUMIN/CREAT UR: 13.33 MG/G CR (ref 0–17)
NONHDLC SERPL-MCNC: 144 MG/DL
POTASSIUM BLD-SCNC: 4.5 MMOL/L (ref 3.4–5.3)
PROT SERPL-MCNC: 7.6 G/DL (ref 6.8–8.8)
SODIUM SERPL-SCNC: 142 MMOL/L (ref 133–144)
TRIGL SERPL-MCNC: 160 MG/DL

## 2022-11-29 PROCEDURE — 80053 COMPREHEN METABOLIC PANEL: CPT

## 2022-11-29 PROCEDURE — 80061 LIPID PANEL: CPT

## 2022-11-29 PROCEDURE — 36415 COLL VENOUS BLD VENIPUNCTURE: CPT

## 2022-11-29 PROCEDURE — 82043 UR ALBUMIN QUANTITATIVE: CPT

## 2022-12-01 ENCOUNTER — OFFICE VISIT (OUTPATIENT)
Dept: FAMILY MEDICINE | Facility: CLINIC | Age: 62
End: 2022-12-01
Payer: COMMERCIAL

## 2022-12-01 VITALS
TEMPERATURE: 98 F | HEIGHT: 73 IN | WEIGHT: 313.8 LBS | DIASTOLIC BLOOD PRESSURE: 90 MMHG | SYSTOLIC BLOOD PRESSURE: 140 MMHG | OXYGEN SATURATION: 96 % | HEART RATE: 80 BPM | RESPIRATION RATE: 18 BRPM | BODY MASS INDEX: 41.59 KG/M2

## 2022-12-01 DIAGNOSIS — I10 ESSENTIAL HYPERTENSION, BENIGN: ICD-10-CM

## 2022-12-01 DIAGNOSIS — Z00.01 ENCOUNTER FOR ROUTINE ADULT MEDICAL EXAM WITH ABNORMAL FINDINGS: Primary | ICD-10-CM

## 2022-12-01 DIAGNOSIS — E78.2 MIXED HYPERLIPIDEMIA: ICD-10-CM

## 2022-12-01 DIAGNOSIS — F41.1 GAD (GENERALIZED ANXIETY DISORDER): ICD-10-CM

## 2022-12-01 DIAGNOSIS — N45.1 EPIDIDYMITIS, LEFT: ICD-10-CM

## 2022-12-01 DIAGNOSIS — E66.01 MORBID OBESITY (H): ICD-10-CM

## 2022-12-01 PROCEDURE — 90471 IMMUNIZATION ADMIN: CPT | Performed by: FAMILY MEDICINE

## 2022-12-01 PROCEDURE — 99213 OFFICE O/P EST LOW 20 MIN: CPT | Mod: 25 | Performed by: FAMILY MEDICINE

## 2022-12-01 PROCEDURE — 99396 PREV VISIT EST AGE 40-64: CPT | Mod: 25 | Performed by: FAMILY MEDICINE

## 2022-12-01 PROCEDURE — 90682 RIV4 VACC RECOMBINANT DNA IM: CPT | Performed by: FAMILY MEDICINE

## 2022-12-01 RX ORDER — LISINOPRIL 20 MG/1
20 TABLET ORAL DAILY
Qty: 90 TABLET | Refills: 2 | Status: SHIPPED | OUTPATIENT
Start: 2022-12-01 | End: 2023-03-15

## 2022-12-01 ASSESSMENT — ENCOUNTER SYMPTOMS: HEMATOCHEZIA: 1

## 2022-12-01 ASSESSMENT — PAIN SCALES - GENERAL: PAINLEVEL: NO PAIN (0)

## 2022-12-01 NOTE — PROGRESS NOTES
SUBJECTIVE:   CC: Ruddy is an 62 year old who presents for preventative health visit.   Patient has been advised of split billing requirements and indicates understanding: Yes  Healthy Habits:     Getting at least 3 servings of Calcium per day:  NO    Bi-annual eye exam:  Yes    Dental care twice a year:  NO    Sleep apnea or symptoms of sleep apnea:  None    Diet:  Regular (no restrictions)    Frequency of exercise:  2-3 days/week    Duration of exercise:  15-30 minutes    Taking medications regularly:  Yes    Medication side effects:  Muscle aches    PHQ-2 Total Score: 0    Additional concerns today:  No          PROBLEMS TO ADD ON...  Some anxiety and wanting to lose weight.    Today's PHQ-2 Score:   PHQ-2 ( 1999 [employer]) 11/24/2022   Q1: Little interest or pleasure in doing things 0   Q2: Feeling down, depressed or hopeless 0   PHQ-2 Score 0   PHQ-2 Total Score (12-17 Years)- Positive if 3 or more points; Administer PHQ-A if positive -   Q1: Little interest or pleasure in doing things Not at all   Q2: Feeling down, depressed or hopeless Not at all   PHQ-2 Score 0       Social History     Tobacco Use     Smoking status: Never     Smokeless tobacco: Never   Substance Use Topics     Alcohol use: Yes     Alcohol/week: 0.0 standard drinks     Comment: rare     If you drink alcohol do you typically have >3 drinks per day or >7 drinks per week? No    Alcohol Use 11/24/2022   Prescreen: >3 drinks/day or >7 drinks/week? No   Prescreen: >3 drinks/day or >7 drinks/week? -       Last PSA: No results found for: PSA    Reviewed orders with patient. Reviewed health maintenance and updated orders accordingly - Yes  Labs reviewed in EPIC  BP Readings from Last 3 Encounters:   12/01/22 (!) 140/90   11/01/21 124/80   10/18/21 (!) 140/120    Wt Readings from Last 3 Encounters:   12/01/22 142.3 kg (313 lb 12.8 oz)   11/01/21 140 kg (308 lb 9.6 oz)   10/18/21 140 kg (308 lb 9.6 oz)                  Patient Active Problem List    Diagnosis     Plantar fascial fibromatosis     Mixed hyperlipidemia     Hereditary and idiopathic peripheral neuropathy     Essential hypertension, benign     ESOPHAGEAL REFLUX     Varicose veins of lower extremities with complications     Reyes's esophagus     HYPERLIPIDEMIA LDL GOAL <130     Morbid obesity (H)     Tear of medial cartilage or meniscus of Rt knee, current     Edema     Hypertension goal BP (blood pressure) < 140/90     Elevated cholesterol with elevated triglycerides     Benign essential hypertension     Migraine without status migrainosus, not intractable, unspecified migraine type     Contusion of bone     SYDNEE (generalized anxiety disorder)     Other insomnia     Anxiety     Rosacea     Chronic left shoulder pain     Subacromial impingement, left     Nontraumatic incomplete tear of left rotator cuff     Past Surgical History:   Procedure Laterality Date     COLONOSCOPY  04/08/2013    Procedure: COLONOSCOPY;  Colonoscopy, Esophagogastroduodenoscopy with Single Biopsy;  Surgeon: Eddie Marshall MD;  Location:  GI     ESOPHAGOSCOPY, GASTROSCOPY, DUODENOSCOPY (EGD), COMBINED  04/08/2013    Procedure: COMBINED ESOPHAGOSCOPY, GASTROSCOPY, DUODENOSCOPY (EGD), BIOPSY SINGLE OR MULTIPLE;;  Surgeon: Eddie Marshall MD;  Location:  GI     ESOPHAGOSCOPY, GASTROSCOPY, DUODENOSCOPY (EGD), COMBINED N/A 01/25/2017    Procedure: COMBINED ESOPHAGOSCOPY, GASTROSCOPY, DUODENOSCOPY (EGD), BIOPSY SINGLE OR MULTIPLE;  Surgeon: Kodi Ng MD;  Location:  GI     EYE SURGERY  2018    [facility]GI ENDOSCOPY DIAG W BIOPSY  12/5/2008, 03/12/10     OPEN REDUCTION INTERNAL FIXATION WRIST Right 02/01/2016     REMOVE NAIL BED/FINGER TIP Left 05/01/2016     ROTATOR CUFF REPAIR RT/LT Right 03/01/2016       Social History     Tobacco Use     Smoking status: Never     Smokeless tobacco: Never   Substance Use Topics     Alcohol use: Yes     Alcohol/week: 0.0 standard drinks     Comment: rare      Family History   Problem Relation Age of Onset     Heart Disease Father         alcohol dependant,  at age 52     Lipids Father      Hypertension Father      Cerebrovascular Disease Brother         had an aneurysm     Arthritis Sister         rheumatiod arthritis         Current Outpatient Medications   Medication Sig Dispense Refill     aspirin 81 MG tablet ONE DAILY       cetirizine (ZYRTEC) 10 MG tablet Take 1 tablet (10 mg) by mouth daily 90 tablet 3     Cholecalciferol (VITAMIN D3 PO) Take 1,000 Units by mouth daily       clonazePAM (KLONOPIN) 0.5 MG tablet TAKE ONE TABLET BY MOUTH TWICE A DAY AS NEEDED FOR ANXIETY 60 tablet 5     ibuprofen (ADVIL/MOTRIN) 600 MG tablet Take 1 tablet (600 mg) by mouth every 6 hours as needed for moderate pain 60 tablet 0     lisinopril (ZESTRIL) 10 MG tablet TAKE ONE TABLET BY MOUTH ONCE DAILY 90 tablet 3     metroNIDAZOLE (METROCREAM) 0.75 % external cream Apply topically 2 times daily 45 g 4     MULTI VITAMIN MENS OR TABS 1 tablet daily       omeprazole (PRILOSEC) 40 MG DR capsule TAKE ONE CAPSULE BY MOUTH ONCE DAILY 30 TO 60 MINUTES BEFORE MEALS 90 capsule 0     pravastatin (PRAVACHOL) 40 MG tablet TAKE ONE TABLET BY MOUTH ONCE DAILY 90 tablet 3     SUMAtriptan (IMITREX) 100 MG tablet TAKE 1 TABLET BY MOUTH AT ONSET OF MIGRAINE. MAY REPEAT IN 2 HOURS IF HEADACHE IS NOT GONE. (MAXIMUM DOSE IS 200MG IN 24 HOURS) 9 tablet 0     SUMAtriptan Succinate Refill (IMITREX) 6 MG/0.5ML SOCT INJECT 0.5ML FOR ONE DOSE. MAY REPEAT IN 1 HR, DO NOT EXCEED 2 SHOTS IN 24 HOURS 2 mL 4     Allergies   Allergen Reactions     Tramadol      Anxiety      Recent Labs   Lab Test 22  0920 03/10/21  0816 21  0830 20  1200 18  1617 06/17/15  1207 14  1821   * 100* 133*  --   --    < >  --    HDL 37* 32* 34*  --   --    < >  --    TRIG 160* 143 185*  --   --    < >  --    ALT 42 33 40 66  --    < >  --    CR 0.95  --  0.95 0.85  --    < > 1.01   GFRESTIMATED >90  --   87 >90  --    < > 77   GFRESTBLACK  --   --  >90 >90  --    < > >90   GFR Calc     POTASSIUM 4.5  --  4.6 4.0  --    < > 3.8   TSH  --   --   --   --  0.82  --  2.97    < > = values in this interval not displayed.        Reviewed and updated as needed this visit by clinical staff   Tobacco  Allergies  Meds              Reviewed and updated as needed this visit by Provider                 Past Medical History:   Diagnosis Date     Arthritis      Reyes's esophagus with esophagitis 01/01/2008     Esophageal reflux 11/27/2006     Gastro-oesophageal reflux disease      Migraine      Morbid obesity (H) 01/22/2009     Obesity, unspecified      Pure hypercholesterolemia      SPRAIN SHOULDER/ARM NOS 12/28/2005     Tear of medial cartilage or meniscus of Rt knee, current 10/17/2013     Unspecified essential hypertension       Past Surgical History:   Procedure Laterality Date     COLONOSCOPY  04/08/2013    Procedure: COLONOSCOPY;  Colonoscopy, Esophagogastroduodenoscopy with Single Biopsy;  Surgeon: Eddie Marshall MD;  Location:  GI     ESOPHAGOSCOPY, GASTROSCOPY, DUODENOSCOPY (EGD), COMBINED  04/08/2013    Procedure: COMBINED ESOPHAGOSCOPY, GASTROSCOPY, DUODENOSCOPY (EGD), BIOPSY SINGLE OR MULTIPLE;;  Surgeon: Eddie Marshall MD;  Location:  GI     ESOPHAGOSCOPY, GASTROSCOPY, DUODENOSCOPY (EGD), COMBINED N/A 01/25/2017    Procedure: COMBINED ESOPHAGOSCOPY, GASTROSCOPY, DUODENOSCOPY (EGD), BIOPSY SINGLE OR MULTIPLE;  Surgeon: Kodi Ng MD;  Location:  GI     EYE SURGERY  2018    [facility] UGI ENDOSCOPY DIAG W BIOPSY  12/5/2008, 03/12/10     OPEN REDUCTION INTERNAL FIXATION WRIST Right 02/01/2016     REMOVE NAIL BED/FINGER TIP Left 05/01/2016     ROTATOR CUFF REPAIR RT/LT Right 03/01/2016       Review of Systems   Gastrointestinal: Positive for hematochezia.     CONSTITUTIONAL: NEGATIVE for fever, chills, change in weight  INTEGUMENTARY/SKIN: NEGATIVE for worrisome  "rashes, moles or lesions  EYES: NEGATIVE for vision changes or irritation  ENT: NEGATIVE for ear, mouth and throat problems  RESP: NEGATIVE for significant cough or SOB  CV: NEGATIVE for chest pain, palpitations or peripheral edema  GI: some bleeding with forcing  Stools out but not regularly occurring. Due for his colonoscopy this coming January    male: negative for dysuria, hematuria, decreased urinary stream, erectile dysfunction, urethral discharge  MUSCULOSKELETAL: NEGATIVE for significant arthralgias or myalgia  NEURO: NEGATIVE for weakness, dizziness or paresthesias  PSYCHIATRIC: NEGATIVE for changes in mood or affect    OBJECTIVE:   BP (!) 140/90   Pulse 80   Temp 98  F (36.7  C)   Resp 18   Ht 1.854 m (6' 1\")   Wt 142.3 kg (313 lb 12.8 oz)   SpO2 96%   BMI 41.40 kg/m      Physical Exam  GENERAL: healthy, alert and no distress  EYES: Eyes grossly normal to inspection, PERRL and conjunctivae and sclerae normal  HENT: ear canals and TM's normal, nose and mouth without ulcers or lesions  NECK: no adenopathy, no asymmetry, masses, or scars and thyroid normal to palpation  RESP: lungs clear to auscultation - no rales, rhonchi or wheezes  CV: regular rate and rhythm, normal S1 S2, no S3 or S4, no murmur, click or rub, no peripheral edema and peripheral pulses strong  ABDOMEN: Abdomen is obese soft and nontender throughout.  He is got a diastases recti above the umbilicus but no true hernia.   (male): Both testes are descended bilaterally.  He is got slight tenderness along the epididymal gland in the posterior aspect behind the testy and inferiorly but no cysts or nodules are noted.  MS: no gross musculoskeletal defects noted, no edema  SKIN: no suspicious lesions or rashes  NEURO: Normal strength and tone, mentation intact and speech normal  PSYCH: mentation appears normal, affect normal/bright    Diagnostic Test Results:  Labs reviewed in Epic    ASSESSMENT/PLAN:   (Z00.01) Encounter for routine " "adult medical exam with abnormal findings  (primary encounter diagnosis)  Comment: Overall doing well.  Despite the fact that he is gaining weight he feels great and is not having any issues with shortness of breath, chest pain or joint issues.    Plan: See below.    (I10) Essential hypertension, benign  Comment: Blood pressure not well controlled on his 10 mg of lisinopril.  Plan: lisinopril (ZESTRIL) 20 MG tablet        We will double the dose to 20 mg and recheck blood pressure in 3 months.    (E78.2) Mixed hyperlipidemia  Comment: Hyperlipidemia is stable on his cholesterol medication  Plan: Continue at current dose.    (E66.01) Morbid obesity (H)  Comment: We discussed weight loss and portion control.  Plan: He is going to work on that with his wife, he is going to start doing more walking outside and increasing his activity levels overall.  He will work on his snacking and trying to minimize the simple carbs.    (F41.1) SYDNEE (generalized anxiety disorder)  Comment: Anxiety for the most part is stable he uses the clonazepam as needed at bedtime.  Plan: No changes in therapy.    (N45.1) Epididymitis, left  Comment: Some tenderness along the left posterior inferior aspect of the epididymal gland.  Plan: He is can use ibuprofen 3 times a day for the next 5 to 7 days and see if that helps minimize that tenderness.      Patient has been advised of split billing requirements and indicates understanding: Yes      COUNSELING:   Reviewed preventive health counseling, as reflected in patient instructions       Regular exercise       Healthy diet/nutrition       Vision screening       Immunizations    We did discuss vaccinations and he is going to get his COVID booster and flu shot through the [facility] pharmacy where he gets his medications filled.          BMI:   Estimated body mass index is 41.4 kg/m  as calculated from the following:    Height as of this encounter: 1.854 m (6' 1\").    Weight as of this encounter: 142.3 " kg (313 lb 12.8 oz).   Weight management plan: He is going to work on increasing his activity levels and portion control.      He reports that he has never smoked. He has never used smokeless tobacco.      Electronically signed by:  Maikol Allen M.D.  12/1/2022

## 2022-12-09 DIAGNOSIS — K22.70 BARRETT'S ESOPHAGUS WITHOUT DYSPLASIA: ICD-10-CM

## 2022-12-13 DIAGNOSIS — G43.909 MIGRAINE WITHOUT STATUS MIGRAINOSUS, NOT INTRACTABLE, UNSPECIFIED MIGRAINE TYPE: ICD-10-CM

## 2022-12-13 RX ORDER — OMEPRAZOLE 40 MG/1
CAPSULE, DELAYED RELEASE ORAL
Qty: 90 CAPSULE | Refills: 0 | Status: SHIPPED | OUTPATIENT
Start: 2022-12-13 | End: 2023-03-10

## 2022-12-15 NOTE — TELEPHONE ENCOUNTER
"Pending Prescriptions:                       Disp   Refills    SUMAtriptan (IMITREX) 100 MG tablet [Pharm*9 tabl*0        Sig: TAKE 1 TABLET BY MOUTH AT ONSET OF MIGRAINE MAY           REPEAT IN 2 HOURS IF HEADACHE IS NOT GONE (MAX           DOSE 2 TABLETS IN 24 HOURS)    Routing refill request to provider for review/approval because:  Requested Prescriptions   Pending Prescriptions Disp Refills    SUMAtriptan (IMITREX) 100 MG tablet [Pharmacy Med Name: SUMATRIPTAN SUCCINATE 100MG TABS] 9 tablet 0     Sig: TAKE 1 TABLET BY MOUTH AT ONSET OF MIGRAINE MAY REPEAT IN 2 HOURS IF HEADACHE IS NOT GONE (MAX DOSE 2 TABLETS IN 24 HOURS)       Serotonin Agonists Failed - 12/13/2022 10:00 AM        Failed - Blood pressure under 140/90 in past 12 months     BP Readings from Last 3 Encounters:   12/01/22 (!) 140/90   11/01/21 124/80   10/18/21 (!) 140/120                 Failed - Serotonin Agonist request needs review.     Please review patient's record. If patient has had 8 or more treatments in the past month, please forward to provider.          Passed - Recent (12 mo) or future (30 days) visit within the authorizing provider's specialty     Patient has had an office visit with the authorizing provider or a provider within the authorizing providers department within the previous 12 mos or has a future within next 30 days. See \"Patient Info\" tab in inbasket, or \"Choose Columns\" in Meds & Orders section of the refill encounter.              Passed - Medication is active on med list        Passed - Patient is age 18 or older           SUMAtriptan (IMITREX) 100 MG tablet 9 tablet 0 2/1/2022  No   Sig: TAKE 1 TABLET BY MOUTH AT ONSET OF MIGRAINE. MAY REPEAT IN 2 HOURS IF HEADACHE IS NOT GONE. (MAXIMUM DOSE IS 200MG IN 24 HOURS)   Sent to pharmacy as: SUMAtriptan Succinate 100 MG Oral Tablet (IMITREX)   Class: E-Prescribe   Order: [id number]   E-Prescribing Status: Receipt confirmed by pharmacy (2/1/2022  2:45 PM CST)     Su" CONOR Timmons RN on 12/15/2022 at 11:31 AM

## 2022-12-16 RX ORDER — SUMATRIPTAN 100 MG/1
TABLET, FILM COATED ORAL
Qty: 18 TABLET | Refills: 4 | Status: SHIPPED | OUTPATIENT
Start: 2022-12-16

## 2023-03-10 DIAGNOSIS — K22.70 BARRETT'S ESOPHAGUS WITHOUT DYSPLASIA: ICD-10-CM

## 2023-03-10 RX ORDER — OMEPRAZOLE 40 MG/1
CAPSULE, DELAYED RELEASE ORAL
Qty: 90 CAPSULE | Refills: 2 | Status: SHIPPED | OUTPATIENT
Start: 2023-03-10 | End: 2023-03-23

## 2023-03-13 ENCOUNTER — ALLIED HEALTH/NURSE VISIT (OUTPATIENT)
Dept: FAMILY MEDICINE | Facility: CLINIC | Age: 63
End: 2023-03-13
Payer: COMMERCIAL

## 2023-03-13 VITALS — SYSTOLIC BLOOD PRESSURE: 142 MMHG | DIASTOLIC BLOOD PRESSURE: 96 MMHG

## 2023-03-13 DIAGNOSIS — Z01.30 BP CHECK: Primary | ICD-10-CM

## 2023-03-13 PROCEDURE — 99207 PR NO CHARGE NURSE ONLY: CPT

## 2023-03-13 NOTE — PROGRESS NOTES
Ruddy Browning is a 62 year old patient who comes in today for a Blood Pressure check.  Initial BP:  BP (!) 142/96      Data Unavailable  Disposition: results routed to provider and BP elevated.  Triage RN notified, patient asked to wait  Patient is asymptomatic when leaving he will wait to hear the next steps.       Margaret Reaves MA 3/13/2023

## 2023-03-14 ENCOUNTER — MYC MEDICAL ADVICE (OUTPATIENT)
Dept: FAMILY MEDICINE | Facility: CLINIC | Age: 63
End: 2023-03-14
Payer: COMMERCIAL

## 2023-03-14 DIAGNOSIS — I10 ESSENTIAL HYPERTENSION, BENIGN: ICD-10-CM

## 2023-03-15 RX ORDER — LISINOPRIL 20 MG/1
20 TABLET ORAL DAILY
Qty: 90 TABLET | Refills: 2 | Status: SHIPPED | OUTPATIENT
Start: 2023-03-15 | End: 2023-03-23

## 2023-03-15 NOTE — TELEPHONE ENCOUNTER
Patient states his pharmacy never received his refill from 12.2022. Refill resent.  Audelia Rollins RN on 3/15/2023 at 1:21 PM

## 2023-08-03 ENCOUNTER — MYC MEDICAL ADVICE (OUTPATIENT)
Dept: FAMILY MEDICINE | Facility: CLINIC | Age: 63
End: 2023-08-03
Payer: COMMERCIAL

## 2023-08-03 DIAGNOSIS — I10 ESSENTIAL HYPERTENSION, BENIGN: ICD-10-CM

## 2023-08-03 DIAGNOSIS — K22.70 BARRETT'S ESOPHAGUS WITHOUT DYSPLASIA: ICD-10-CM

## 2023-08-03 RX ORDER — LISINOPRIL 20 MG/1
20 TABLET ORAL DAILY
Qty: 90 TABLET | Refills: 2 | Status: SHIPPED | OUTPATIENT
Start: 2023-08-03 | End: 2023-11-15

## 2023-08-03 RX ORDER — OMEPRAZOLE 40 MG/1
CAPSULE, DELAYED RELEASE ORAL
Qty: 90 CAPSULE | Refills: 1 | Status: SHIPPED | OUTPATIENT
Start: 2023-08-03 | End: 2024-03-25

## 2023-10-29 ENCOUNTER — MYC REFILL (OUTPATIENT)
Dept: FAMILY MEDICINE | Facility: CLINIC | Age: 63
End: 2023-10-29
Payer: COMMERCIAL

## 2023-10-29 DIAGNOSIS — I10 ESSENTIAL HYPERTENSION, BENIGN: ICD-10-CM

## 2023-10-30 RX ORDER — LISINOPRIL 20 MG/1
20 TABLET ORAL DAILY
Qty: 90 TABLET | Refills: 2 | OUTPATIENT
Start: 2023-10-30

## 2023-11-15 ENCOUNTER — MYC REFILL (OUTPATIENT)
Dept: FAMILY MEDICINE | Facility: CLINIC | Age: 63
End: 2023-11-15
Payer: COMMERCIAL

## 2023-11-15 DIAGNOSIS — I10 ESSENTIAL HYPERTENSION, BENIGN: ICD-10-CM

## 2023-11-15 RX ORDER — LISINOPRIL 20 MG/1
20 TABLET ORAL DAILY
Qty: 90 TABLET | Refills: 3 | Status: SHIPPED | OUTPATIENT
Start: 2023-11-15 | End: 2024-04-01

## 2023-11-21 DIAGNOSIS — F41.1 GAD (GENERALIZED ANXIETY DISORDER): ICD-10-CM

## 2023-11-21 DIAGNOSIS — G47.09 OTHER INSOMNIA: ICD-10-CM

## 2023-11-23 RX ORDER — CLONAZEPAM 0.5 MG/1
TABLET ORAL
Qty: 60 TABLET | Refills: 5 | Status: SHIPPED | OUTPATIENT
Start: 2023-11-23 | End: 2024-06-04

## 2024-01-06 ENCOUNTER — HEALTH MAINTENANCE LETTER (OUTPATIENT)
Age: 64
End: 2024-01-06

## 2024-01-24 ENCOUNTER — MYC MEDICAL ADVICE (OUTPATIENT)
Dept: FAMILY MEDICINE | Facility: CLINIC | Age: 64
End: 2024-01-24
Payer: COMMERCIAL

## 2024-01-25 ASSESSMENT — ENCOUNTER SYMPTOMS
PARESTHESIAS: 0
DYSURIA: 0
DIZZINESS: 0
FEVER: 0
HEADACHES: 0
EYE PAIN: 0
COUGH: 0
ABDOMINAL PAIN: 0
MYALGIAS: 0
SHORTNESS OF BREATH: 0
FREQUENCY: 0
DIARRHEA: 0
WEAKNESS: 0
CHILLS: 0
PALPITATIONS: 0
JOINT SWELLING: 0
CONSTIPATION: 0
NERVOUS/ANXIOUS: 1
ARTHRALGIAS: 0
HEMATURIA: 0
HEARTBURN: 0
HEMATOCHEZIA: 0
NAUSEA: 0
SORE THROAT: 0

## 2024-02-01 ENCOUNTER — OFFICE VISIT (OUTPATIENT)
Dept: FAMILY MEDICINE | Facility: CLINIC | Age: 64
End: 2024-02-01
Payer: COMMERCIAL

## 2024-02-01 VITALS
OXYGEN SATURATION: 98 % | WEIGHT: 261.6 LBS | TEMPERATURE: 97.6 F | HEART RATE: 69 BPM | SYSTOLIC BLOOD PRESSURE: 142 MMHG | RESPIRATION RATE: 18 BRPM | BODY MASS INDEX: 35.43 KG/M2 | DIASTOLIC BLOOD PRESSURE: 86 MMHG | HEIGHT: 72 IN

## 2024-02-01 DIAGNOSIS — E78.5 HYPERLIPIDEMIA LDL GOAL <130: ICD-10-CM

## 2024-02-01 DIAGNOSIS — Z00.01 ENCOUNTER FOR ROUTINE ADULT MEDICAL EXAM WITH ABNORMAL FINDINGS: Primary | ICD-10-CM

## 2024-02-01 DIAGNOSIS — Z12.5 SCREENING FOR PROSTATE CANCER: ICD-10-CM

## 2024-02-01 DIAGNOSIS — Z12.11 SCREEN FOR COLON CANCER: ICD-10-CM

## 2024-02-01 DIAGNOSIS — E78.2 MIXED HYPERLIPIDEMIA: ICD-10-CM

## 2024-02-01 DIAGNOSIS — I10 ESSENTIAL HYPERTENSION, BENIGN: ICD-10-CM

## 2024-02-01 DIAGNOSIS — E78.2 ELEVATED CHOLESTEROL WITH ELEVATED TRIGLYCERIDES: ICD-10-CM

## 2024-02-01 DIAGNOSIS — L60.0 INGROWING LEFT GREAT TOENAIL: ICD-10-CM

## 2024-02-01 DIAGNOSIS — H61.21 IMPACTED CERUMEN OF RIGHT EAR: ICD-10-CM

## 2024-02-01 LAB
ALBUMIN SERPL BCG-MCNC: 4.3 G/DL (ref 3.5–5.2)
ALP SERPL-CCNC: 59 U/L (ref 40–150)
ALT SERPL W P-5'-P-CCNC: 20 U/L (ref 0–70)
ANION GAP SERPL CALCULATED.3IONS-SCNC: 9 MMOL/L (ref 7–15)
AST SERPL W P-5'-P-CCNC: 24 U/L (ref 0–45)
BILIRUB SERPL-MCNC: 0.4 MG/DL
BUN SERPL-MCNC: 19.3 MG/DL (ref 8–23)
CALCIUM SERPL-MCNC: 9.5 MG/DL (ref 8.8–10.2)
CHLORIDE SERPL-SCNC: 104 MMOL/L (ref 98–107)
CHOLEST SERPL-MCNC: 213 MG/DL
CREAT SERPL-MCNC: 0.93 MG/DL (ref 0.67–1.17)
CREAT UR-MCNC: 108.9 MG/DL
DEPRECATED HCO3 PLAS-SCNC: 25 MMOL/L (ref 22–29)
EGFRCR SERPLBLD CKD-EPI 2021: >90 ML/MIN/1.73M2
FASTING STATUS PATIENT QL REPORTED: YES
GLUCOSE SERPL-MCNC: 102 MG/DL (ref 70–99)
HDLC SERPL-MCNC: 38 MG/DL
LDLC SERPL CALC-MCNC: 155 MG/DL
MICROALBUMIN UR-MCNC: <12 MG/L
MICROALBUMIN/CREAT UR: NORMAL MG/G{CREAT}
NONHDLC SERPL-MCNC: 175 MG/DL
POTASSIUM SERPL-SCNC: 4.4 MMOL/L (ref 3.4–5.3)
PROT SERPL-MCNC: 7.3 G/DL (ref 6.4–8.3)
PSA SERPL DL<=0.01 NG/ML-MCNC: 1.51 NG/ML (ref 0–4.5)
SODIUM SERPL-SCNC: 138 MMOL/L (ref 135–145)
TRIGL SERPL-MCNC: 100 MG/DL

## 2024-02-01 PROCEDURE — 80061 LIPID PANEL: CPT | Performed by: FAMILY MEDICINE

## 2024-02-01 PROCEDURE — 80053 COMPREHEN METABOLIC PANEL: CPT | Performed by: FAMILY MEDICINE

## 2024-02-01 PROCEDURE — 82570 ASSAY OF URINE CREATININE: CPT | Performed by: FAMILY MEDICINE

## 2024-02-01 PROCEDURE — 69210 REMOVE IMPACTED EAR WAX UNI: CPT | Mod: RT | Performed by: FAMILY MEDICINE

## 2024-02-01 PROCEDURE — 36415 COLL VENOUS BLD VENIPUNCTURE: CPT | Performed by: FAMILY MEDICINE

## 2024-02-01 PROCEDURE — 99214 OFFICE O/P EST MOD 30 MIN: CPT | Mod: 25 | Performed by: FAMILY MEDICINE

## 2024-02-01 PROCEDURE — G0103 PSA SCREENING: HCPCS | Performed by: FAMILY MEDICINE

## 2024-02-01 PROCEDURE — 99396 PREV VISIT EST AGE 40-64: CPT | Mod: 25 | Performed by: FAMILY MEDICINE

## 2024-02-01 PROCEDURE — 82043 UR ALBUMIN QUANTITATIVE: CPT | Performed by: FAMILY MEDICINE

## 2024-02-01 RX ORDER — PRAVASTATIN SODIUM 80 MG/1
80 TABLET ORAL DAILY
Qty: 90 TABLET | Refills: 3 | Status: SHIPPED | OUTPATIENT
Start: 2024-02-01

## 2024-02-01 RX ORDER — HYDROCHLOROTHIAZIDE 25 MG/1
25 TABLET ORAL DAILY
Qty: 90 TABLET | Refills: 3 | Status: SHIPPED | OUTPATIENT
Start: 2024-02-01

## 2024-02-01 SDOH — HEALTH STABILITY: PHYSICAL HEALTH: ON AVERAGE, HOW MANY MINUTES DO YOU ENGAGE IN EXERCISE AT THIS LEVEL?: 60 MIN

## 2024-02-01 SDOH — HEALTH STABILITY: PHYSICAL HEALTH: ON AVERAGE, HOW MANY DAYS PER WEEK DO YOU ENGAGE IN MODERATE TO STRENUOUS EXERCISE (LIKE A BRISK WALK)?: 3 DAYS

## 2024-02-01 ASSESSMENT — ENCOUNTER SYMPTOMS
DIARRHEA: 0
DIZZINESS: 0
SHORTNESS OF BREATH: 0
ARTHRALGIAS: 0
COUGH: 0
DYSURIA: 0
MYALGIAS: 0
HEADACHES: 0
WEAKNESS: 0
ABDOMINAL PAIN: 0
JOINT SWELLING: 0
HEMATURIA: 0
CONSTIPATION: 0
NAUSEA: 0
NERVOUS/ANXIOUS: 1
CHILLS: 0
PALPITATIONS: 0
FEVER: 0
SORE THROAT: 0
EYE PAIN: 0
FREQUENCY: 0

## 2024-02-01 ASSESSMENT — LIFESTYLE VARIABLES
HOW OFTEN DO YOU HAVE SIX OR MORE DRINKS ON ONE OCCASION: NEVER
HOW MANY STANDARD DRINKS CONTAINING ALCOHOL DO YOU HAVE ON A TYPICAL DAY: 1 OR 2
HOW OFTEN DO YOU HAVE A DRINK CONTAINING ALCOHOL: MONTHLY OR LESS
SKIP TO QUESTIONS 9-10: 1
AUDIT-C TOTAL SCORE: 1

## 2024-02-01 ASSESSMENT — PAIN SCALES - GENERAL: PAINLEVEL: NO PAIN (0)

## 2024-02-01 ASSESSMENT — SOCIAL DETERMINANTS OF HEALTH (SDOH)
IN A TYPICAL WEEK, HOW MANY TIMES DO YOU TALK ON THE PHONE WITH FAMILY, FRIENDS, OR NEIGHBORS?: MORE THAN THREE TIMES A WEEK
DO YOU BELONG TO ANY CLUBS OR ORGANIZATIONS SUCH AS CHURCH GROUPS UNIONS, FRATERNAL OR ATHLETIC GROUPS, OR SCHOOL GROUPS?: NO
HOW OFTEN DO YOU GET TOGETHER WITH FRIENDS OR RELATIVES?: MORE THAN THREE TIMES A WEEK

## 2024-02-01 NOTE — PROGRESS NOTES
Preventive Care Visit  [facility]  Maikol Allen MD, MD, Family Medicine  Feb 1, 2024    Assessment & Plan     (Z00.01) Encounter for routine adult medical exam with abnormal findings  (primary encounter diagnosis)  Comment: Overall doing well.  He is feeling very well since losing 60 pounds.  Plan: He continues to do a keto diet with occasional cheat days but overall is doing quite well.    (Z12.11) Screen for colon cancer  Comment: Due for his colon cancer screening   Plan: Colonoscopy Screening  Referral        referral was placed.    (E78.2) Elevated cholesterol with elevated triglycerides  (E78.2) Mixed hyperlipidemia  Comment: Has been on cholesterol medication and tolerating that well.  Plan: Lipid panel reflex to direct LDL Non-fasting        Labs drawn.  He will continue his current medication.    (I10) Essential hypertension, benign  Comment: Blood pressure is a little elevated today despite his weight loss.  Plan: Comprehensive metabolic panel (BMP + Alb, Alk         Phos, ALT, AST, Total. Bili, TP), Albumin         Random Urine Quantitative with Creat Ratio,         hydrochlorothiazide (HYDRODIURIL) 25 MG tablet        I am adding hydrochlorothiazide 25 mg to his morning regimen.  Will have him back in 2 months for recheck blood pressure with a float nurse and see if that is under better control.  Labs were drawn today also.    (L60.0) Ingrowing left great toenail  Comment: He has some issues with his left great toenail.  He had it partially removed in 2016 after an infection from an ingrowing nail.  Plan: Orthopedic  Referral        As the nail grew back it was thickened and is hard for him to even trim it.  He still gets some irritation in the medial aspect of the great nail where that ingrown nail was removed.  I did refer him to podiatry.    (H61.21) Impacted cerumen of right ear  Comment: Right ear was impacted.  Plan: REMOVE IMPACTED CERUMEN         Nursing staff irrigated and got a nice large chunk of wax out.      35 minutes spent by me on the date of the encounter doing chart review, history and exam, documentation and further activities per the note      BMI  Estimated body mass index is 35.48 kg/m  as calculated from the following:    Height as of this encounter: 1.829 m (6').    Weight as of this encounter: 118.7 kg (261 lb 9.6 oz).   Weight management plan: Patient is down from a peak weight of 317.  His weight today was 261.  That is a total of 56 pounds down from his last weight that we have here.  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  I encouraged him to do more walking and aerobic type activity 2.    Counseling  Appropriate preventive services were discussed with this patient, including applicable screening as appropriate for fall prevention, nutrition, physical activity, Tobacco-use cessation, weight loss and cognition.  Checklist reviewing preventive services available has been given to the patient.  Reviewed patient's diet, addressing concerns and/or questions.   The patient was instructed to see the dentist every 6 months.     Patient has been advised of split billing requirements and indicates understanding: Yes    MEDICATIONS:   Orders Placed This Encounter   Medications    hydrochlorothiazide (HYDRODIURIL) 25 MG tablet     Sig: Take 1 tablet (25 mg) by mouth daily     Dispense:  90 tablet     Refill:  3          - Continue other medications without change    Subjective   Ruddy is a 63 year old, presenting for the following:  Physical        2/1/2024     7:34 AM   Additional Questions   Roomed by Mirna JONES        Health Care Directive  Patient does not have a Health Care Directive or Living Will: Discussed advance care planning with patient; however, patient declined at this time.  Healthy Habits:     Getting at least 3 servings of Calcium per day:  NO    Bi-annual eye  exam:  Yes    Dental care twice a year:  NO    Sleep apnea or symptoms of sleep apnea:  None    Diet:  Carbohydrate counting    Frequency of exercise:  2-3 days/week    Duration of exercise:  15-30 minutes    Taking medications regularly:  Yes    Additional concerns today:  Yes    Patient is doing well.  He has been on a keto diet for exactly a year.  He is down almost 60 pounds and feeling really good.  Denies any chest pain shortness of breath or any new pain.  Is having issues with his right great toenail since having it removed in 2016.  It grew back it is thicker to the point where he cannot even trim it.  It seems loosened and at times does cause some pain with certain shoes.  There is no signs of infection or redness.  No drainage.  Overall his anxiety seems well-controlled.  Uses clonazepam at bedtime.  If he occasionally forgets it and does not think too much about it he will fall asleep but he wakes up and realized that he has not taken it he will take it again and sleeps well.  He does worry about his kids, his son has recovered from lymphoma but his son's wife now has breast cancer.  His biological daughter is an alcoholic so she continues to have issues with her health also.  We talked a lot about what he can and cannot control specially with his children.        1/25/2024   Social Factors   Worry food won't last until get money to buy more No   Food not last or not have enough money for food? No   Do you have housing?  Yes   Are you worried about losing your housing? No   Lack of transportation? No   Unable to get utilities (heat,electricity)? No          No data to display                 Today's PHQ-2 Score:       2/1/2024     7:32 AM   PHQ-2 ( 1999 [employer])   Q1: Little interest or pleasure in doing things 0   Q2: Feeling down, depressed or hopeless 0   PHQ-2 Score 0   Q1: Little interest or pleasure in doing things Not at all   Q2: Feeling down, depressed or hopeless Not at all   PHQ-2 Score 0  "          1/25/2024   Substance Use   Alcohol more than 3/day or more than 7/wk No     Social History     Tobacco Use    Smoking status: Never    Smokeless tobacco: Never    Tobacco comments:     I've never smoked in my life, anything   Vaping Use    Vaping Use: Never used   Substance Use Topics    Alcohol use: Yes     Comment: rare    Drug use: Never       Last PSA: No results found for: \"PSA\"  The 10-year ASCVD risk score (Isaura DK, et al., 2019) is: 15.7%    Values used to calculate the score:      Age: 63 years      Sex: Male      Is Non- : No      Diabetic: No      Tobacco smoker: No      Systolic Blood Pressure: 142 mmHg      Is BP treated: Yes      HDL Cholesterol: 37 mg/dL      Total Cholesterol: 181 mg/dL         Reviewed and updated as needed this visit by Provider                    Past Medical History:   Diagnosis Date    Arthritis     Reyes's esophagus with esophagitis 01/01/2008    Esophageal reflux 11/27/2006    Gastro-oesophageal reflux disease     Migraine     Morbid obesity (H) 01/22/2009    Obesity, unspecified     Pure hypercholesterolemia     SPRAIN SHOULDER/ARM NOS 12/28/2005    Tear of medial cartilage or meniscus of Rt knee, current 10/17/2013    Unspecified essential hypertension      Past Surgical History:   Procedure Laterality Date    COLONOSCOPY  04/08/2013    Procedure: COLONOSCOPY;  Colonoscopy, Esophagogastroduodenoscopy with Single Biopsy;  Surgeon: Eddie Marshall MD;  Location:  GI    ESOPHAGOSCOPY, GASTROSCOPY, DUODENOSCOPY (EGD), COMBINED  04/08/2013    Procedure: COMBINED ESOPHAGOSCOPY, GASTROSCOPY, DUODENOSCOPY (EGD), BIOPSY SINGLE OR MULTIPLE;;  Surgeon: Eddie Marshall MD;  Location:  GI    ESOPHAGOSCOPY, GASTROSCOPY, DUODENOSCOPY (EGD), COMBINED N/A 01/25/2017    Procedure: COMBINED ESOPHAGOSCOPY, GASTROSCOPY, DUODENOSCOPY (EGD), BIOPSY SINGLE OR MULTIPLE;  Surgeon: Kodi Ng MD;  Location:  GI    EYE SURGERY  2018 "    Lasik    [facility] ENDOSCOPY DIAG W BIOPSY  12/5/2008, 03/12/10    OPEN REDUCTION INTERNAL FIXATION WRIST Right 02/01/2016    REMOVE NAIL BED/FINGER TIP Left 05/01/2016    ROTATOR CUFF REPAIR RT/LT Right 03/01/2016     BP Readings from Last 3 Encounters:   02/01/24 (!) 142/86   03/13/23 (!) 142/96   12/01/22 (!) 140/90    Wt Readings from Last 3 Encounters:   02/01/24 118.7 kg (261 lb 9.6 oz)   12/01/22 142.3 kg (313 lb 12.8 oz)   11/01/21 140 kg (308 lb 9.6 oz)                  Patient Active Problem List   Diagnosis    Plantar fascial fibromatosis    Mixed hyperlipidemia    Hereditary and idiopathic peripheral neuropathy    Essential hypertension, benign    ESOPHAGEAL REFLUX    Varicose veins of lower extremities with complications    Ingrowing left great toenail    Reyes's esophagus    HYPERLIPIDEMIA LDL GOAL <130    Morbid obesity (H)    Tear of medial cartilage or meniscus of Rt knee, current    Edema    Hypertension goal BP (blood pressure) < 140/90    Elevated cholesterol with elevated triglycerides    Benign essential hypertension    Migraine without status migrainosus, not intractable, unspecified migraine type    Contusion of bone    SYDNEE (generalized anxiety disorder)    Other insomnia    Anxiety    Rosacea    Chronic left shoulder pain    Subacromial impingement, left    Nontraumatic incomplete tear of left rotator cuff     Past Surgical History:   Procedure Laterality Date    COLONOSCOPY  04/08/2013    Procedure: COLONOSCOPY;  Colonoscopy, Esophagogastroduodenoscopy with Single Biopsy;  Surgeon: Eddie Marshall MD;  Location: [facility]    ESOPHAGOSCOPY, GASTROSCOPY, DUODENOSCOPY (EGD), COMBINED  04/08/2013    Procedure: COMBINED ESOPHAGOSCOPY, GASTROSCOPY, DUODENOSCOPY (EGD), BIOPSY SINGLE OR MULTIPLE;;  Surgeon: Eddie Marshall MD;  Location: [facility]    ESOPHAGOSCOPY, GASTROSCOPY, DUODENOSCOPY (EGD), COMBINED N/A 01/25/2017    Procedure: COMBINED ESOPHAGOSCOPY, GASTROSCOPY, DUODENOSCOPY (EGD),  BIOPSY SINGLE OR MULTIPLE;  Surgeon: Kodi Ng MD;  Location:  GI    EYE SURGERY  2018    Lasik     UGI ENDOSCOPY DIAG W BIOPSY  2008, 03/12/10    OPEN REDUCTION INTERNAL FIXATION WRIST Right 2016    REMOVE NAIL BED/FINGER TIP Left 2016    ROTATOR CUFF REPAIR RT/LT Right 2016       Social History     Tobacco Use    Smoking status: Never    Smokeless tobacco: Never    Tobacco comments:     I've never smoked in my life, anything   Substance Use Topics    Alcohol use: Yes     Comment: rare     Family History   Problem Relation Age of Onset    Colon Cancer Mother     Heart Disease Father         alcohol dependant,  at age 52    Lipids Father     Hypertension Father     Arthritis Sister         rheumatiod arthritis    Coronary Artery Disease Brother     Cerebrovascular Disease Brother         had an aneurysm    Alcoholism Daughter     Lymphoma Son 39        follicular lymphoma, in remission         Current Outpatient Medications   Medication Sig Dispense Refill    aspirin 81 MG tablet ONE DAILY      Cholecalciferol (VITAMIN D3 PO) Take 1,000 Units by mouth daily      clonazePAM (KLONOPIN) 0.5 MG tablet TAKE ONE TABLET BY MOUTH TWICE A DAY AS NEEDED FOR ANXIETY 60 tablet 5    hydrochlorothiazide (HYDRODIURIL) 25 MG tablet Take 1 tablet (25 mg) by mouth daily 90 tablet 3    lisinopril (ZESTRIL) 20 MG tablet Take 1 tablet (20 mg) by mouth daily 90 tablet 3    metroNIDAZOLE (METROCREAM) 0.75 % external cream APPLY TO AFFECTED AREA(S) TWO TIMES A DAY 45 g 4    MULTI VITAMIN MENS OR TABS 1 tablet daily      omeprazole (PRILOSEC) 40 MG DR capsule TAKE ONE CAPSULE BY MOUTH ONCE DAILY 30-60 MINUTES BEFORE MEALS 90 capsule 1    pravastatin (PRAVACHOL) 40 MG tablet Take 1 tablet (40 mg) by mouth daily 90 tablet 3    SUMAtriptan (IMITREX) 100 MG tablet TAKE 1 TABLET BY MOUTH AT ONSET OF MIGRAINE MAY REPEAT IN 2 HOURS IF HEADACHE IS NOT GONE (MAX DOSE 2 TABLETS IN 24 HOURS) 18 tablet 4     SUMAtriptan Succinate Refill (IMITREX) 6 MG/0.5ML SOCT INJECT 0.5ML FOR ONE DOSE. MAY REPEAT IN 1 HR, DO NOT EXCEED 2 SHOTS IN 24 HOURS 2 mL 4    cetirizine (ZYRTEC) 10 MG tablet Take 1 tablet (10 mg) by mouth daily 90 tablet 3    ibuprofen (ADVIL/MOTRIN) 600 MG tablet Take 1 tablet (600 mg) by mouth every 6 hours as needed for moderate pain 60 tablet 0     Allergies   Allergen Reactions    Tramadol      Anxiety      Recent Labs   Lab Test 02/01/24  0842 11/29/22  0920 03/10/21  0816 01/06/21  0830 03/03/20  1200 03/12/18  1617   * 112* 100* 133*  --   --    HDL 38* 37* 32* 34*  --   --    TRIG 100 160* 143 185*  --   --    ALT 20 42 33 40 66  --    CR 0.93 0.95  --  0.95 0.85  --    GFRESTIMATED >90 >90  --  87 >90  --    GFRESTBLACK  --   --   --  >90 >90  --    POTASSIUM 4.4 4.5  --  4.6 4.0  --    TSH  --   --   --   --   --  0.82      Review of Systems   Constitutional:  Negative for chills and fever.   HENT:  Negative for congestion, ear pain, hearing loss and sore throat.    Eyes:  Negative for pain and visual disturbance.   Respiratory:  Negative for cough and shortness of breath.    Cardiovascular:  Negative for chest pain and palpitations.   Gastrointestinal:  Negative for abdominal pain, constipation, diarrhea and nausea.   Genitourinary:  Negative for dysuria, frequency, genital sores, hematuria, penile discharge and urgency.   Musculoskeletal:  Negative for arthralgias, joint swelling and myalgias.   Skin:  Negative for rash.   Neurological:  Negative for dizziness, weakness and headaches.   Psychiatric/Behavioral:  The patient is nervous/anxious.         Objective    Exam  BP (!) 142/86   Pulse 69   Temp 97.6  F (36.4  C) (Temporal)   Resp 18   Ht 1.829 m (6')   Wt 118.7 kg (261 lb 9.6 oz)   SpO2 98%   BMI 35.48 kg/m     Estimated body mass index is 35.48 kg/m  as calculated from the following:    Height as of this encounter: 1.829 m (6').    Weight as of this encounter: 118.7 kg (261 lb  9.6 oz).  Physical Exam  GENERAL: alert and no distress  EYES: Eyes grossly normal to inspection, PERRL and conjunctivae and sclerae normal  HENT: ear canals and TM's normal, nose and mouth without ulcers or lesions  NECK: no adenopathy, no asymmetry, masses, or scars  RESP: lungs clear to auscultation - no rales, rhonchi or wheezes  CV: regular rate and rhythm, normal S1 S2, no S3 or S4, no murmur, click or rub, no peripheral edema  ABDOMEN: Bowel sounds are present throughout.  He has a mild diastases recti just above the umbilicus.  This is nontender there is no true defect in the fascia.   (male): not indicated   RECTAL: not indicated   MS: no gross musculoskeletal defects noted, no edema.  His right great toenail is very thick it looks like the medial aspect of it is a bit loosened there is a portion of it that actually looks like it is growing down into the tissue but there is no erythema or tenderness with this.  I told him that I want him to see podiatry to see if they want this removed or what else he would recommend.  SKIN: no suspicious lesions or rashes  NEURO: Normal strength and tone, mentation intact and speech normal  PSYCH: mentation appears normal, affect normal/bright  LYMPH: no cervical, supraclavicular, axillary, or inguinal adenopathy      Electronically signed by:  Maikol Allen M.D.  2/1/2024      Answers submitted by the patient for this visit:  Annual Preventive Visit (Submitted on 1/25/2024)  Chief Complaint: Annual Exam:  Blood in stool: No  heartburn: No  peripheral edema: Yes  mood changes: No  Skin sensation changes: No  impotence: No

## 2024-02-06 ENCOUNTER — TELEPHONE (OUTPATIENT)
Dept: GASTROENTEROLOGY | Facility: CLINIC | Age: 64
End: 2024-02-06
Payer: COMMERCIAL

## 2024-02-06 NOTE — TELEPHONE ENCOUNTER
"Endoscopy Scheduling Screen    Have you had a positive Covid test in the last 14 days?  No    Are you active on MyChart?   Yes    What insurance is in the chart?  Other:  [employer]    Ordering/Referring Provider:     Maikol Allen MD in [facility]      (If ordering provider performs procedure, schedule with ordering provider unless otherwise instructed. )    BMI: Estimated body mass index is 35.48 kg/m  as calculated from the following:    Height as of 2/1/24: 1.829 m (6').    Weight as of 2/1/24: 118.7 kg (261 lb 9.6 oz).     Sedation Ordered  moderate sedation.   If patient BMI > 50 do not schedule in [facility].    If patient BMI > 45 do not schedule at [facility].    Are you taking methadone or Suboxone?  No    Have you had difficulties, pain, or discomfort during past endoscopy procedures?  No    Are you taking any prescription medications for pain 3 or more times per week?   NO - No RN review required.    Do you have a history of malignant hyperthermia or adverse reaction to anesthesia?  No    (Females) Are you currently pregnant?   No     Have you been diagnosed or told you have pulmonary hypertension?   No    Do you have an LVAD?  No    Have you been told you have moderate to severe sleep apnea?  No    Have you been told you have COPD, asthma, or any other lung disease?  No    Do you have any heart conditions?  No     Have you ever had an organ transplant?   No    Have you ever had or are you awaiting a heart or lung transplant?   No    Have you had a stroke or transient ischemic attack (TIA aka \"mini stroke\" in the last 6 months?   No    Have you been diagnosed with or been told you have cirrhosis of the liver?   No    Are you currently on dialysis?   No    Do you need assistance transferring?   No    BMI: Estimated body mass index is 35.48 kg/m  as calculated from the following:    Height as of 2/1/24: 1.829 m (6').    Weight as of 2/1/24: 118.7 kg (261 lb 9.6 oz).     Is patients BMI > 40 and scheduling " location [facility]?  No    Do you take an injectable medication for weight loss or diabetes (excluding insulin)?  No    Do you take the medication Naltrexone?  No    Do you take blood thinners?  No       Prep   Are you currently on dialysis or do you have chronic kidney disease?  No    Do you have a diagnosis of diabetes?  No    Do you have a diagnosis of cystic fibrosis (CF)?  No    On a regular basis do you go 3 -5 days between bowel movements?  No    BMI > 40?  No    Preferred Pharmacy:    [facility] - [place], [place] - [address]  [address]  [facility] [address]  Phone: [phone] Fax: [phone]      Final Scheduling Details   Colonoscopy prep sent?  N/A    Procedure scheduled  Colonoscopy    Surgeon:  Marissa     Date of procedure:  03/07/2024     Pre-OP / PAC:   No - Not required for this site.    Location  [facility] - Patient preference.    Sedation   MAC/Deep Sedation  Location       Patient Reminders:   You will receive a call from a Nurse to review instructions and health history.  This assessment must be completed prior to your procedure.  Failure to complete the Nurse assessment may result in the procedure being cancelled.      On the day of your procedure, please designate an adult(s) who can drive you home stay with you for the next 24 hours. The medicines used in the exam will make you sleepy. You will not be able to drive.      You cannot take public transportation, ride share services, or non-medical taxi service without a responsible caregiver.  Medical transport services are allowed with the requirement that a responsible caregiver will receive you at your destination.  We require that drivers and caregivers are confirmed prior to your procedure.

## 2024-02-14 ENCOUNTER — TELEPHONE (OUTPATIENT)
Dept: GASTROENTEROLOGY | Facility: CLINIC | Age: 64
End: 2024-02-14
Payer: COMMERCIAL

## 2024-02-14 NOTE — TELEPHONE ENCOUNTER
Caller: Pt's Wife Emy  Reason for Reschedule/Cancellation (please be detailed, any staff messages or encounters to note?): Schedule Conflict      Prior to reschedule please review:  Ordering Provider: MAGGIE MCCLURE   Sedation Determined: Moderate  Does patient have any ASC Exclusions, please identify?: No      Notes on Cancelled Procedure:  Procedure: Lower Endoscopy [Colonoscopy]   Date: 03/07/2024  Location: [facility]; [address] [place] , [place] [address]  Surgeon: RJ      Rescheduled: Yes  Procedure: Lower Endoscopy [Colonoscopy]   Date: 04/02/2024  Location: [facility]; [address] [place] [address]  Surgeon: LONG  Sedation Level Scheduled  MAC,  Reason for Sedation Level Per Location  Prep/Instructions updated and sent: Yes, Mary Jane

## 2024-02-19 ENCOUNTER — OFFICE VISIT (OUTPATIENT)
Dept: PODIATRY | Facility: CLINIC | Age: 64
End: 2024-02-19
Attending: FAMILY MEDICINE
Payer: COMMERCIAL

## 2024-02-19 DIAGNOSIS — B35.1 ONYCHOMYCOSIS: Primary | ICD-10-CM

## 2024-02-19 DIAGNOSIS — L60.0 INGROWING LEFT GREAT TOENAIL: ICD-10-CM

## 2024-02-19 PROCEDURE — 99203 OFFICE O/P NEW LOW 30 MIN: CPT | Performed by: PODIATRIST

## 2024-02-19 RX ORDER — ECONAZOLE NITRATE 10 MG/G
CREAM TOPICAL DAILY
Qty: 85 G | Refills: 5 | Status: SHIPPED | OUTPATIENT
Start: 2024-02-19

## 2024-02-19 NOTE — PROGRESS NOTES
Past Medical History:   Diagnosis Date    Arthritis     Reyes's esophagus with esophagitis 01/01/2008    Esophageal reflux 11/27/2006    Gastro-oesophageal reflux disease     Migraine     Morbid obesity (H) 01/22/2009    Obesity, unspecified     Pure hypercholesterolemia     SPRAIN SHOULDER/ARM NOS 12/28/2005    Tear of medial cartilage or meniscus of Rt knee, current 10/17/2013    Unspecified essential hypertension      Patient Active Problem List   Diagnosis    Plantar fascial fibromatosis    Mixed hyperlipidemia    Hereditary and idiopathic peripheral neuropathy    Essential hypertension, benign    ESOPHAGEAL REFLUX    Varicose veins of lower extremities with complications    Ingrowing left great toenail    Reyes's esophagus    HYPERLIPIDEMIA LDL GOAL <130    Morbid obesity (H)    Tear of medial cartilage or meniscus of Rt knee, current    Edema    Hypertension goal BP (blood pressure) < 140/90    Elevated cholesterol with elevated triglycerides    Benign essential hypertension    Migraine without status migrainosus, not intractable, unspecified migraine type    Contusion of bone    SYDNEE (generalized anxiety disorder)    Other insomnia    Anxiety    Rosacea    Chronic left shoulder pain    Subacromial impingement, left    Nontraumatic incomplete tear of left rotator cuff     Past Surgical History:   Procedure Laterality Date    COLONOSCOPY  04/08/2013    Procedure: COLONOSCOPY;  Colonoscopy, Esophagogastroduodenoscopy with Single Biopsy;  Surgeon: Eddie Marshall MD;  Location: [facility]    ESOPHAGOSCOPY, GASTROSCOPY, DUODENOSCOPY (EGD), COMBINED  04/08/2013    Procedure: COMBINED ESOPHAGOSCOPY, GASTROSCOPY, DUODENOSCOPY (EGD), BIOPSY SINGLE OR MULTIPLE;;  Surgeon: Eddie Marshall MD;  Location: [facility]    ESOPHAGOSCOPY, GASTROSCOPY, DUODENOSCOPY (EGD), COMBINED N/A 01/25/2017    Procedure: COMBINED ESOPHAGOSCOPY, GASTROSCOPY, DUODENOSCOPY (EGD), BIOPSY SINGLE OR MULTIPLE;  Surgeon: Kodi Ng  MD Darrion;  Location:  GI    EYE SURGERY  2018    Lasik    [facility] ENDOSCOPY DIAG W BIOPSY  12/5/2008, 03/12/10    OPEN REDUCTION INTERNAL FIXATION WRIST Right 02/01/2016    REMOVE NAIL BED/FINGER TIP Left 05/01/2016    ROTATOR CUFF REPAIR RT/LT Right 03/01/2016     Social History     Socioeconomic History    Marital status:      Spouse name: Emy    Number of children: 2    Years of education: 16    Highest education level: Not on file   Occupational History    Occupation:      Comment: [employer].   Tobacco Use    Smoking status: Never    Smokeless tobacco: Never    Tobacco comments:     I've never smoked in my life, anything   Vaping Use    Vaping Use: Never used   Substance and Sexual Activity    Alcohol use: Yes     Comment: rare    Drug use: Never    Sexual activity: Yes     Partners: Female     Birth control/protection: None   Other Topics Concern     Service No    Blood Transfusions No    Caffeine Concern No    Occupational Exposure Yes     Comment: [employer]    Hobby Hazards No    Sleep Concern No    Stress Concern No    Weight Concern No    Special Diet No    Back Care No    Exercise Yes     Comment: walking at the fitness center and lifting some weight    Bike Helmet Not Asked     Comment: N/A    Seat Belt Yes    Self-Exams Not Asked     Comment: N/A    Parent/sibling w/ CABG, MI or angioplasty before 65F 55M? Yes     Comment: Brother had heart attack not sure of details   Social History Narrative    Not on file     Social Determinants of Health     Financial Resource Strain: Low Risk  (1/25/2024)    Financial Resource Strain     Within the past 12 months, have you or your family members you live with been unable to get utilities (heat, electricity) when it was really needed?: No   Food Insecurity: Low Risk  (1/25/2024)    Food Insecurity     Within the past 12 months, did you worry that your food would run out before you got money to buy more?: No     Within  the past 12 months, did the food you bought just not last and you didn t have money to get more?: No   Transportation Needs: Low Risk  (2024)    Transportation Needs     Within the past 12 months, has lack of transportation kept you from medical appointments, getting your medicines, non-medical meetings or appointments, work, or from getting things that you need?: No   Physical Activity: Sufficiently Active (2024)    Exercise Vital Sign     Days of Exercise per Week: 3 days     Minutes of Exercise per Session: 60 min   Stress: No Stress Concern Present (2024)    [employer] - Occupational Stress Questionnaire     Feeling of Stress : Only a little   Social Connections: Unknown (2024)    Social Connection and Isolation Panel [NHANES]     Frequency of Communication with Friends and Family: More than three times a week     Frequency of Social Gatherings with Friends and Family: More than three times a week     Attends Congregational Services: Not on file     Active Member of Clubs or Organizations: No     Attends Club or Organization Meetings: Not on file     Marital Status: Not on file   Interpersonal Safety: Low Risk  (2024)    Interpersonal Safety     Do you feel physically and emotionally safe where you currently live?: Yes     Within the past 12 months, have you been hit, slapped, kicked or otherwise physically hurt by someone?: No     Within the past 12 months, have you been humiliated or emotionally abused in other ways by your partner or ex-partner?: No   Housing Stability: Low Risk  (2024)    Housing Stability     Do you have housing? : Yes     Are you worried about losing your housing?: No     Family History   Problem Relation Age of Onset    Colon Cancer Mother     Heart Disease Father         alcohol dependant,  at age 52    Lipids Father     Hypertension Father     Arthritis Sister         rheumatiod arthritis    Coronary Artery Disease Brother      "Cerebrovascular Disease Brother         had an aneurysm    Alcoholism Daughter     Lymphoma Son 39        follicular lymphoma, in remission       Lab Results   Component Value Date    AST 24 02/01/2024    AST 19 03/10/2021     Lab Results   Component Value Date    ALT 20 02/01/2024    ALT 33 03/10/2021     No results found for: \"BILICONJ\"   Lab Results   Component Value Date    BILITOTAL 0.4 02/01/2024    BILITOTAL 0.6 01/06/2021     Lab Results   Component Value Date    ALBUMIN 4.3 02/01/2024    ALBUMIN 4.0 11/29/2022    ALBUMIN 4.1 01/06/2021     Lab Results   Component Value Date    PROTTOTAL 7.3 02/01/2024    PROTTOTAL 8.1 01/06/2021      Lab Results   Component Value Date    ALKPHOS 59 02/01/2024    ALKPHOS 74 01/06/2021           Subjective findings- 63-year-old presents from Maikol Allen MD for ingrown toenail, I reviewed Dr. Nathan 2/01/2024 note.  He relates in 2016 he had the left Hallux nail removed for ingrown toenail, and it grew back thick and now does not really grow much, relates that it hurts a little bit at times, relates it was loose previously so tried at take it off himself.    Objective findings- DP and PT are 2 out of 4 left.  Left Hallux nail that is loosened distally and thick dystrophic brittle with discoloration subungual debris and mild ecchymosis.  Has left second toenail with mild dystrophy and discoloration and thickening distally.  Has dorsally contracted digits 1 through 5 left.  There is no erythema, no edema, no drainage, no odor, no pain on palpation.    Assessment and plan- Onychomycosis left Hallux and second toenails causing intermittent pain.  Diagnosis and treatment options discussed with the patient.  The left Hallux was debrided upon consent.  Left Hallux nail bled upon debridement and local wound care with Bacitracin and Band-Aid done upon consent and use discussed with the patient.  Prescription for Econazole cream given use discussed with the patient.  Return to " clinic and see me in 4 to 5 months.  Previous notes reviewed.                    Low level of medical decision making.

## 2024-02-19 NOTE — NURSING NOTE
Ruddy Browning's chief complaint for this visit includes:  Chief Complaint   Patient presents with    Consult     Left great toe nail, thickened, has had it pulled in 2016     PCP: Maikol Allen    Referring Provider:  Maikol Allen MD  [address] [place] [address]    There were no vitals taken for this visit.  Data Unavailable     Do you need any medication refills at today's visit? NO    Allergies   Allergen Reactions    Tramadol      Anxiety        Soco Pretty LPN

## 2024-03-23 DIAGNOSIS — K22.70 BARRETT'S ESOPHAGUS WITHOUT DYSPLASIA: ICD-10-CM

## 2024-03-25 RX ORDER — OMEPRAZOLE 40 MG/1
CAPSULE, DELAYED RELEASE ORAL
Qty: 90 CAPSULE | Refills: 1 | Status: SHIPPED | OUTPATIENT
Start: 2024-03-25 | End: 2024-05-20

## 2024-04-01 ENCOUNTER — ANESTHESIA EVENT (OUTPATIENT)
Dept: GASTROENTEROLOGY | Facility: CLINIC | Age: 64
End: 2024-04-01
Payer: COMMERCIAL

## 2024-04-01 DIAGNOSIS — I10 ESSENTIAL HYPERTENSION, BENIGN: ICD-10-CM

## 2024-04-01 RX ORDER — LISINOPRIL 20 MG/1
20 TABLET ORAL DAILY
Qty: 90 TABLET | Refills: 1 | Status: SHIPPED | OUTPATIENT
Start: 2024-04-01

## 2024-04-01 NOTE — ANESTHESIA PREPROCEDURE EVALUATION
Anesthesia Pre-Procedure Evaluation    Patient: Ruddy Browning   MRN: [id number] : 1960        Procedure : Procedure(s):  Colonoscopy          Past Medical History:   Diagnosis Date    Arthritis     Reyes's esophagus with esophagitis 2008    Esophageal reflux 2006    Gastro-oesophageal reflux disease     Migraine     Morbid obesity (H) 2009    Obesity, unspecified     Pure hypercholesterolemia     SPRAIN SHOULDER/ARM NOS 2005    Tear of medial cartilage or meniscus of Rt knee, current 10/17/2013    Unspecified essential hypertension       Past Surgical History:   Procedure Laterality Date    COLONOSCOPY  2013    Procedure: COLONOSCOPY;  Colonoscopy, Esophagogastroduodenoscopy with Single Biopsy;  Surgeon: Eddie Marshall MD;  Location: [facility]    ESOPHAGOSCOPY, GASTROSCOPY, DUODENOSCOPY (EGD), COMBINED  2013    Procedure: COMBINED ESOPHAGOSCOPY, GASTROSCOPY, DUODENOSCOPY (EGD), BIOPSY SINGLE OR MULTIPLE;;  Surgeon: Eddie Marshall MD;  [facility]    ESOPHAGOSCOPY, GASTROSCOPY, DUODENOSCOPY (EGD), COMBINED N/A 2017    Procedure: COMBINED ESOPHAGOSCOPY, GASTROSCOPY, DUODENOSCOPY (EGD), BIOPSY SINGLE OR MULTIPLE;  Surgeon: Kodi Ng MD;  Location:  GI    EYE SURGERY  2018    [facility] UGI ENDOSCOPY DIAG W BIOPSY  2008, 03/12/10    OPEN REDUCTION INTERNAL FIXATION WRIST Right 2016    REMOVE NAIL BED/FINGER TIP Left 2016    ROTATOR CUFF REPAIR RT/LT Right 2016      Allergies   Allergen Reactions    Tramadol      Anxiety       Social History     Tobacco Use    Smoking status: Never    Smokeless tobacco: Never    Tobacco comments:     I've never smoked in my life, anything   Substance Use Topics    Alcohol use: Yes     Comment: rare      Wt Readings from Last 1 Encounters:   24 118.7 kg (261 lb 9.6 oz)        Anesthesia Evaluation   Pt has had prior anesthetic. Type: General and MAC.        ROS/MED  HX  ENT/Pulmonary:  - neg pulmonary ROS   (+)     CRISTINA risk factors, snores loudly, hypertension,                              (-) tobacco use   Neurologic: Comment: Neuropathy     (+)      migraines,                          Cardiovascular:     (+)  hypertension- -   -  - -                                 Previous cardiac testing   Echo: Date: Results:    Stress Test:  Date: Results:    ECG Reviewed:  Date: 2017 Results:  ST  Cath:  Date: Results:      METS/Exercise Tolerance:     Hematologic:  - neg hematologic  ROS     Musculoskeletal: Comment: Shoulder pain   Plantar fascial fibromatosis  (+)  arthritis,             GI/Hepatic: Comment: Reyes's esophagus    (+) GERD, Asymptomatic on medication,      bowel prep,            Renal/Genitourinary:  - neg Renal ROS     Endo:     (+)               Obesity,       Psychiatric/Substance Use: Comment: Insomnia     (+) psychiatric history anxiety       Infectious Disease:       Malignancy:  - neg malignancy ROS     Other:            Physical Exam    Airway  airway exam normal      Mallampati: II   TM distance: > 3 FB   Neck ROM: full   Mouth opening: > 3 cm    Respiratory Devices and Support         Dental  no notable dental history   Comment: Upper front left tooth capped and missing multiple posterior teeth        Cardiovascular   cardiovascular exam normal       Rhythm and rate: regular and normal     Pulmonary   pulmonary exam normal        breath sounds clear to auscultation         OUTSIDE LABS:  CBC:   Lab Results   Component Value Date    WBC 5.7 01/06/2021    WBC 5.6 03/03/2020    HGB 15.7 01/06/2021    HGB 13.8 03/03/2020    HCT 47.8 01/06/2021    HCT 41.2 03/03/2020     01/06/2021     03/03/2020     BMP:   Lab Results   Component Value Date     02/01/2024     11/29/2022    POTASSIUM 4.4 02/01/2024    POTASSIUM 4.5 11/29/2022    CHLORIDE 104 02/01/2024    CHLORIDE 111 (H) 11/29/2022    CO2 25 02/01/2024    CO2 31 11/29/2022    BUN 19.3  "02/01/2024    BUN 19 11/29/2022    CR 0.93 02/01/2024    CR 0.95 11/29/2022     (H) 02/01/2024     (H) 11/29/2022     COAGS: No results found for: \"PTT\", \"INR\", \"FIBR\"  POC: No results found for: \"BGM\", \"HCG\", \"HCGS\"  HEPATIC:   Lab Results   Component Value Date    ALBUMIN 4.3 02/01/2024    PROTTOTAL 7.3 02/01/2024    ALT 20 02/01/2024    AST 24 02/01/2024    ALKPHOS 59 02/01/2024    BILITOTAL 0.4 02/01/2024     OTHER:   Lab Results   Component Value Date    PH 6.0 02/04/2002    CRISTOPHER 9.5 02/01/2024    TSH 0.82 03/12/2018    CRP <2.9 01/17/2017    SED 8 01/17/2017       Anesthesia Plan    ASA Status:  2    NPO Status:  NPO Appropriate    Anesthesia Type: MAC.     - Reason for MAC: straight local not clinically adequate   Induction: Intravenous, Propofol.   Maintenance: TIVA.        Consents    Anesthesia Plan(s) and associated risks, benefits, and realistic alternatives discussed. Questions answered and patient/representative(s) expressed understanding.     - Discussed:     - Discussed with:  Patient      - Extended Intubation/Ventilatory Support Discussed: No.      - Patient is DNR/DNI Status: No     Use of blood products discussed: No .     Postoperative Care       PONV prophylaxis: Background Propofol Infusion     Comments:    Other Comments: The risks and benefits of anesthesia, and the alternatives where applicable, have been discussed with the patient, and they wish to proceed.              MARGARET Medel CRNA    I have reviewed the pertinent notes and labs in the chart from the past 30 days and (re)examined the patient.  Any updates or changes from those notes are reflected in this note.                  "

## 2024-04-01 NOTE — H&P
[facility] Anesthesia Pre-op History and Physical    Ruddy Browning MRN# [id number]   Age: 63 year old YOB: 1960      Date of Surgery: 4/2/2024 Location [facility]      Date of Exam 4/2/2024 Facility (In hospital)       Home clinic: [facility]  Primary care provider: Maikol Allen         Chief Complaint and/or Reason for Procedure:   No chief complaint on file.  Colonoscopy  Unknown prior.  Mother with CRC age 80.       Active problem list:     Patient Active Problem List    Diagnosis Date Noted    Subacromial impingement, left 11/01/2021     Priority: Medium    Nontraumatic incomplete tear of left rotator cuff 11/01/2021     Priority: Medium    Rosacea 07/22/2021     Priority: Medium    Chronic left shoulder pain 07/22/2021     Priority: Medium    Anxiety 01/05/2021     Priority: Medium    Contusion of bone 03/12/2018     Priority: Medium    SYDNEE (generalized anxiety disorder) 03/12/2018     Priority: Medium    Other insomnia 03/12/2018     Priority: Medium    Migraine without status migrainosus, not intractable, unspecified migraine type 04/04/2017     Priority: Medium    Benign essential hypertension 02/22/2017     Priority: Medium    Elevated cholesterol with elevated triglycerides 08/29/2016     Priority: Medium    Edema 12/29/2014     Priority: Medium    Hypertension goal BP (blood pressure) < 140/90 12/29/2014     Priority: Medium    Tear of medial cartilage or meniscus of Rt knee, current 10/17/2013     Priority: Medium    Morbid obesity (H) 02/26/2013     Priority: Medium    HYPERLIPIDEMIA LDL GOAL <130 10/31/2010     Priority: Medium    Reyes's esophagus 03/04/2009     Priority: Medium    Ingrowing left great toenail 05/30/2008     Priority: Medium    Varicose veins of lower extremities with complications 07/17/2007     Priority: Medium     Problem list name updated by automated process. Provider to review      ESOPHAGEAL  REFLUX 11/27/2006     Priority: Medium    Essential hypertension, benign 10/26/2004     Priority: Medium    Hereditary and idiopathic peripheral neuropathy 05/22/2002     Priority: Medium     Problem list name updated by automated process. Provider to review      Mixed hyperlipidemia 02/05/2002     Priority: Medium    Plantar fascial fibromatosis 10/03/2001     Priority: Medium     left              Medications (include herbals and vitamins):   Any Plavix use in the last 7 days? No     No current facility-administered medications for this encounter.     Current Outpatient Medications   Medication Sig    Cholecalciferol (VITAMIN D3 PO) Take 1,000 Units by mouth daily    clonazePAM (KLONOPIN) 0.5 MG tablet TAKE ONE TABLET BY MOUTH TWICE A DAY AS NEEDED FOR ANXIETY    hydrochlorothiazide (HYDRODIURIL) 25 MG tablet Take 1 tablet (25 mg) by mouth daily    lisinopril (ZESTRIL) 20 MG tablet Take 1 tablet (20 mg) by mouth daily    MULTI VITAMIN MENS OR TABS 1 tablet daily    omeprazole (PRILOSEC) 40 MG DR capsule TAKE 1 CAPSULE DAILY 30 TO 60  MINUTES BEFORE A MEAL    pravastatin (PRAVACHOL) 80 MG tablet Take 1 tablet (80 mg) by mouth daily    SUMAtriptan (IMITREX) 100 MG tablet TAKE 1 TABLET BY MOUTH AT ONSET OF MIGRAINE MAY REPEAT IN 2 HOURS IF HEADACHE IS NOT GONE (MAX DOSE 2 TABLETS IN 24 HOURS)    SUMAtriptan Succinate Refill (IMITREX) 6 MG/0.5ML SOCT INJECT 0.5ML FOR ONE DOSE. MAY REPEAT IN 1 HR, DO NOT EXCEED 2 SHOTS IN 24 HOURS    bisacodyl (DULCOLAX) 5 MG EC tablet Take two (2) tablet at 4 pm the day before your procedure.  If your procedure is before 11 am, take two (2) additional tablets at 8 pm.  If your procedure is after 11 am, take two (2) additional tablets at 6 am. For additional instructions refer to your colonoscopy prep instructions.    econazole nitrate 1 % external cream Apply topically daily To toenails.    metroNIDAZOLE (METROCREAM) 0.75 % external cream APPLY TO AFFECTED AREA(S) TWO TIMES A DAY     polyethylene glycol (MIRALAX) 17 GM/Dose powder - Mix 8.3 oz of miralax powder with 64 oz of gatorade or other sport drink (no red or purple).  For additional instructions refer to your colonoscopy prep instructions.             Allergies:      Allergies   Allergen Reactions    Tramadol      Anxiety      Allergy to Latex? No  Allergy to tape?   No  Intolerances:             Physical Exam:   All vitals have been reviewed  No data found.  No intake/output data recorded.  Lungs:   No increased work of breathing, good air exchange, clear to auscultation bilaterally, no crackles or wheezing     Cardiovascular:   Normal apical impulse, regular rate and rhythm, normal S1 and S2, no S3 or S4, and no murmur noted             Lab / Radiology Results:            Anesthetic risk and/or ASA classification:       Lisandro Bruce MD

## 2024-04-02 ENCOUNTER — HOSPITAL ENCOUNTER (OUTPATIENT)
Facility: CLINIC | Age: 64
Discharge: HOME | End: 2024-04-02
Attending: INTERNAL MEDICINE | Admitting: INTERNAL MEDICINE
Payer: COMMERCIAL

## 2024-04-02 ENCOUNTER — ANESTHESIA (OUTPATIENT)
Dept: GASTROENTEROLOGY | Facility: CLINIC | Age: 64
End: 2024-04-02
Payer: COMMERCIAL

## 2024-04-02 VITALS
SYSTOLIC BLOOD PRESSURE: 118 MMHG | DIASTOLIC BLOOD PRESSURE: 82 MMHG | HEART RATE: 72 BPM | OXYGEN SATURATION: 92 % | TEMPERATURE: 97.9 F | RESPIRATION RATE: 16 BRPM

## 2024-04-02 LAB — COLONOSCOPY: NORMAL

## 2024-04-02 PROCEDURE — 250N000009 HC RX 250: Performed by: NURSE ANESTHETIST, CERTIFIED REGISTERED

## 2024-04-02 PROCEDURE — G0105 COLORECTAL SCRN; HI RISK IND: HCPCS | Performed by: INTERNAL MEDICINE

## 2024-04-02 PROCEDURE — 250N000011 HC RX IP 250 OP 636: Performed by: NURSE ANESTHETIST, CERTIFIED REGISTERED

## 2024-04-02 PROCEDURE — 45378 DIAGNOSTIC COLONOSCOPY: CPT | Performed by: INTERNAL MEDICINE

## 2024-04-02 PROCEDURE — 370N000017 HC ANESTHESIA TECHNICAL FEE, PER MIN: Performed by: INTERNAL MEDICINE

## 2024-04-02 PROCEDURE — G0121 COLON CA SCRN NOT HI RSK IND: HCPCS | Performed by: INTERNAL MEDICINE

## 2024-04-02 PROCEDURE — 258N000003 HC RX IP 258 OP 636: Performed by: NURSE ANESTHETIST, CERTIFIED REGISTERED

## 2024-04-02 RX ORDER — PROPOFOL 10 MG/ML
INJECTION, EMULSION INTRAVENOUS CONTINUOUS PRN
Status: DISCONTINUED | OUTPATIENT
Start: 2024-04-02 | End: 2024-04-02

## 2024-04-02 RX ORDER — ONDANSETRON 2 MG/ML
4 INJECTION INTRAMUSCULAR; INTRAVENOUS EVERY 30 MIN PRN
Status: DISCONTINUED | OUTPATIENT
Start: 2024-04-02 | End: 2024-04-02 | Stop reason: HOSPADM

## 2024-04-02 RX ORDER — LIDOCAINE HYDROCHLORIDE 20 MG/ML
INJECTION, SOLUTION INFILTRATION; PERINEURAL PRN
Status: DISCONTINUED | OUTPATIENT
Start: 2024-04-02 | End: 2024-04-02

## 2024-04-02 RX ORDER — ONDANSETRON 4 MG/1
4 TABLET, ORALLY DISINTEGRATING ORAL EVERY 30 MIN PRN
Status: DISCONTINUED | OUTPATIENT
Start: 2024-04-02 | End: 2024-04-02 | Stop reason: HOSPADM

## 2024-04-02 RX ORDER — NALOXONE HYDROCHLORIDE 0.4 MG/ML
0.1 INJECTION, SOLUTION INTRAMUSCULAR; INTRAVENOUS; SUBCUTANEOUS
Status: DISCONTINUED | OUTPATIENT
Start: 2024-04-02 | End: 2024-04-02 | Stop reason: HOSPADM

## 2024-04-02 RX ORDER — PROPOFOL 10 MG/ML
INJECTION, EMULSION INTRAVENOUS PRN
Status: DISCONTINUED | OUTPATIENT
Start: 2024-04-02 | End: 2024-04-02

## 2024-04-02 RX ORDER — SODIUM CHLORIDE, SODIUM LACTATE, POTASSIUM CHLORIDE, CALCIUM CHLORIDE 600; 310; 30; 20 MG/100ML; MG/100ML; MG/100ML; MG/100ML
INJECTION, SOLUTION INTRAVENOUS CONTINUOUS
Status: DISCONTINUED | OUTPATIENT
Start: 2024-04-02 | End: 2024-04-02 | Stop reason: HOSPADM

## 2024-04-02 RX ADMIN — PROPOFOL 70 MG: 10 INJECTION, EMULSION INTRAVENOUS at 10:00

## 2024-04-02 RX ADMIN — LIDOCAINE HYDROCHLORIDE 50 MG: 20 INJECTION, SOLUTION INFILTRATION; PERINEURAL at 09:59

## 2024-04-02 RX ADMIN — SODIUM CHLORIDE, POTASSIUM CHLORIDE, SODIUM LACTATE AND CALCIUM CHLORIDE: 600; 310; 30; 20 INJECTION, SOLUTION INTRAVENOUS at 09:52

## 2024-04-02 RX ADMIN — PROPOFOL 200 MCG/KG/MIN: 10 INJECTION, EMULSION INTRAVENOUS at 10:00

## 2024-04-02 ASSESSMENT — ACTIVITIES OF DAILY LIVING (ADL): ADLS_ACUITY_SCORE: 35

## 2024-04-02 ASSESSMENT — LIFESTYLE VARIABLES: TOBACCO_USE: 0

## 2024-04-02 NOTE — ANESTHESIA CARE TRANSFER NOTE
Patient: Ruddy Browning    Procedure: Procedure(s):  Colonoscopy       Diagnosis: Screen for colon cancer [Z12.11]  Diagnosis Additional Information: No value filed.    Anesthesia Type:   MAC     Note:    Oropharynx: oropharynx clear of all foreign objects and spontaneously breathing  Level of Consciousness: drowsy  Oxygen Supplementation: face mask    Independent Airway: airway patency satisfactory and stable  Dentition: dentition unchanged  Vital Signs Stable: post-procedure vital signs reviewed and stable  Report to RN Given: handoff report given  Patient transferred to: Phase II    Handoff Report: Identifed the Patient, Identified the Reponsible Provider, Reviewed the pertinent medical history, Discussed the surgical course, Reviewed Intra-OP anesthesia mangement and issues during anesthesia, Set expectations for post-procedure period and Allowed opportunity for questions and acknowledgement of understanding      Vitals:  Vitals Value Taken Time   /82 04/02/24 1030   Temp     Pulse 96 04/02/24 1030   Resp     SpO2 96 % 04/02/24 1037   Vitals shown include unfiled device data.    Electronically Signed By: MARGARET Mckeon CRNA  April 2, 2024  10:38 AM

## 2024-04-02 NOTE — ANESTHESIA POSTPROCEDURE EVALUATION
Patient: Ruddy Browning    Procedure: Procedure(s):  Colonoscopy       Anesthesia Type:  MAC    Note:  Disposition: Outpatient   Postop Pain Control: Uneventful            Sign Out: Well controlled pain   PONV: No   Neuro/Psych: Uneventful            Sign Out: Acceptable/Baseline neuro status   Airway/Respiratory: Uneventful            Sign Out: Acceptable/Baseline resp. status   CV/Hemodynamics: Uneventful            Sign Out: Acceptable CV status   Other NRE: NONE   DID A NON-ROUTINE EVENT OCCUR? No    Event details/Postop Comments:  Pt was happy with anesthesia care.  No complications.  I will follow up with the pt if needed.           Last vitals:  Vitals Value Taken Time   /82 04/02/24 1030   Temp     Pulse 96 04/02/24 1030   Resp     SpO2 94 % 04/02/24 1038   Vitals shown include unfiled device data.    Electronically Signed By: MARGARET Mckeon CRNA  April 2, 2024  10:39 AM

## 2024-04-02 NOTE — DISCHARGE INSTRUCTIONS
[facility]    Home Care Following Endoscopy          Activity:  You have just undergone an endoscopic procedure usually performed with conscious sedation.  Do not work or operate machinery (including a car) for at least 12 hours.    I encourage you to walk and attempt to pass this air as soon as possible.    Diet:  Return to the diet you were on before your procedure but eat lightly for the first 12-24 hours.  Drink plenty of water.  Resume any regular medications unless otherwise advised by your physician.  Please begin any new medication prescribed as a result of your procedure as directed by your physician.   If you had any biopsy or polyp removed please refrain from aspirin or aspirin products for 2 days.  If on Coumadin please restart as instructed by your physician.   Pain:  You may take Tylenol as needed for pain.  Expected Recovery:  You can expect some mild abdominal fullness and/or discomfort due to the air used to inflate your intestinal tract.     Call Your Physician if You Have:  After Colonoscopy:  Worsening persisting abdominal pain which is worse with activity.  Fevers (>101 degrees F), chills or shakes.  Passage of continued blood with bowel movements.     Any questions or concerns about your recovery, please call [phone] or after hours [phone] ([phone]) Nurse Advice Line.    Follow-up Care:  You did not have polyps/biopsy tissue sample(s) removed.

## 2024-05-20 ENCOUNTER — MYC REFILL (OUTPATIENT)
Dept: FAMILY MEDICINE | Facility: CLINIC | Age: 64
End: 2024-05-20
Payer: COMMERCIAL

## 2024-05-20 DIAGNOSIS — K22.70 BARRETT'S ESOPHAGUS WITHOUT DYSPLASIA: ICD-10-CM

## 2024-05-20 RX ORDER — OMEPRAZOLE 40 MG/1
CAPSULE, DELAYED RELEASE ORAL
Qty: 90 CAPSULE | Refills: 0 | Status: SHIPPED | OUTPATIENT
Start: 2024-05-20 | End: 2024-07-09

## 2024-06-04 DIAGNOSIS — F41.1 GAD (GENERALIZED ANXIETY DISORDER): ICD-10-CM

## 2024-06-04 DIAGNOSIS — G47.09 OTHER INSOMNIA: ICD-10-CM

## 2024-06-04 RX ORDER — CLONAZEPAM 0.5 MG/1
TABLET ORAL
Qty: 60 TABLET | Refills: 5 | Status: SHIPPED | OUTPATIENT
Start: 2024-06-04

## 2024-07-09 ENCOUNTER — MYC REFILL (OUTPATIENT)
Dept: FAMILY MEDICINE | Facility: CLINIC | Age: 64
End: 2024-07-09
Payer: COMMERCIAL

## 2024-07-09 DIAGNOSIS — K22.70 BARRETT'S ESOPHAGUS WITHOUT DYSPLASIA: ICD-10-CM

## 2024-07-09 RX ORDER — OMEPRAZOLE 40 MG/1
CAPSULE, DELAYED RELEASE ORAL
Qty: 90 CAPSULE | Refills: 1 | Status: SHIPPED | OUTPATIENT
Start: 2024-07-09

## 2024-11-12 DIAGNOSIS — I10 ESSENTIAL HYPERTENSION, BENIGN: ICD-10-CM

## 2024-11-12 DIAGNOSIS — K22.70 BARRETT'S ESOPHAGUS WITHOUT DYSPLASIA: ICD-10-CM

## 2024-11-12 RX ORDER — LISINOPRIL 20 MG/1
20 TABLET ORAL DAILY
Qty: 90 TABLET | Refills: 0 | Status: SHIPPED | OUTPATIENT
Start: 2024-11-12

## 2024-11-12 RX ORDER — OMEPRAZOLE 40 MG/1
CAPSULE, DELAYED RELEASE ORAL
Qty: 90 CAPSULE | Refills: 3 | OUTPATIENT
Start: 2024-11-12

## 2024-11-24 DIAGNOSIS — K22.70 BARRETT'S ESOPHAGUS WITHOUT DYSPLASIA: ICD-10-CM

## 2024-11-25 RX ORDER — OMEPRAZOLE 40 MG/1
CAPSULE, DELAYED RELEASE ORAL
Qty: 90 CAPSULE | Refills: 3 | OUTPATIENT
Start: 2024-11-25

## 2024-11-30 DIAGNOSIS — K22.70 BARRETT'S ESOPHAGUS WITHOUT DYSPLASIA: ICD-10-CM

## 2024-11-30 DIAGNOSIS — I10 ESSENTIAL HYPERTENSION, BENIGN: ICD-10-CM

## 2024-12-02 ENCOUNTER — ANCILLARY PROCEDURE (OUTPATIENT)
Dept: GENERAL RADIOLOGY | Facility: CLINIC | Age: 64
End: 2024-12-02
Attending: PHYSICIAN ASSISTANT
Payer: COMMERCIAL

## 2024-12-02 ENCOUNTER — OFFICE VISIT (OUTPATIENT)
Dept: ORTHOPEDICS | Facility: CLINIC | Age: 64
End: 2024-12-02
Payer: COMMERCIAL

## 2024-12-02 VITALS
WEIGHT: 277 LBS | DIASTOLIC BLOOD PRESSURE: 72 MMHG | TEMPERATURE: 97.8 F | HEIGHT: 72 IN | HEART RATE: 81 BPM | BODY MASS INDEX: 37.52 KG/M2 | SYSTOLIC BLOOD PRESSURE: 126 MMHG

## 2024-12-02 DIAGNOSIS — M75.112 NONTRAUMATIC INCOMPLETE TEAR OF LEFT ROTATOR CUFF: Primary | ICD-10-CM

## 2024-12-02 DIAGNOSIS — M75.42 SUBACROMIAL IMPINGEMENT, LEFT: ICD-10-CM

## 2024-12-02 DIAGNOSIS — K22.70 BARRETT'S ESOPHAGUS WITHOUT DYSPLASIA: ICD-10-CM

## 2024-12-02 PROCEDURE — 73030 X-RAY EXAM OF SHOULDER: CPT | Mod: TC | Performed by: RADIOLOGY

## 2024-12-02 PROCEDURE — 20610 DRAIN/INJ JOINT/BURSA W/O US: CPT | Mod: LT | Performed by: PHYSICIAN ASSISTANT

## 2024-12-02 PROCEDURE — 99203 OFFICE O/P NEW LOW 30 MIN: CPT | Mod: 25 | Performed by: PHYSICIAN ASSISTANT

## 2024-12-02 RX ORDER — LISINOPRIL 20 MG/1
20 TABLET ORAL DAILY
Qty: 90 TABLET | Refills: 3 | OUTPATIENT
Start: 2024-12-02

## 2024-12-02 RX ORDER — TRIAMCINOLONE ACETONIDE 40 MG/ML
40 INJECTION, SUSPENSION INTRA-ARTICULAR; INTRAMUSCULAR
Status: COMPLETED | OUTPATIENT
Start: 2024-12-02 | End: 2024-12-02

## 2024-12-02 RX ORDER — BUPIVACAINE HYDROCHLORIDE 5 MG/ML
3 INJECTION, SOLUTION PERINEURAL
Status: COMPLETED | OUTPATIENT
Start: 2024-12-02 | End: 2024-12-02

## 2024-12-02 RX ORDER — OMEPRAZOLE 40 MG/1
CAPSULE, DELAYED RELEASE ORAL
Qty: 90 CAPSULE | Refills: 3 | OUTPATIENT
Start: 2024-12-02

## 2024-12-02 RX ADMIN — TRIAMCINOLONE ACETONIDE 40 MG: 40 INJECTION, SUSPENSION INTRA-ARTICULAR; INTRAMUSCULAR at 08:35

## 2024-12-02 RX ADMIN — BUPIVACAINE HYDROCHLORIDE 3 ML: 5 INJECTION, SOLUTION PERINEURAL at 08:35

## 2024-12-02 ASSESSMENT — PAIN SCALES - GENERAL: PAINLEVEL_OUTOF10: NO PAIN (0)

## 2024-12-02 NOTE — PROGRESS NOTES
ORTHOPEDIC CONSULT      Chief Complaint: Ruddy Browning is a 64 year old male    He is being seen for   Chief Complaint   Patient presents with    Shoulder Pain     Left    Consult         History of Present Illness:   Mechanism of Injury: About 2 months ago patient was using shoulder and felt a pop in the shoulder and had pain and less strength.  This eventually after 3 days got better.  Location: Left lateral anterior shoulder  Duration of Pain: Approximately 6 years intermittently.  Worse over the last 2 months  Rating of Pain: 0 out of 10 currently but can be mild to moderate  Pain Quality: Achy and weakness  Pain is better with: Steroid injection and rotating his arm then he can left  Pain is worse with: Using arm away from body  Treatment so far consists of: Patient was last seen on 11/1/2021 by Dr. Taylor and at that time ultrasound came back showing near full-thickness tear of anterior supraspinatus which was the report of the ultrasound as the patient is very claustrophobic and could not do an MRI.  Patient did receive a steroid injection at that time which lasted about 2 years.  Patient was advised to do physical therapy also.  Did 2 sessions of therapy and then did a home exercise program which she felt did not help.  He has not done ibuprofen because they do not keep them in the house and he has not done any topicals.  He has tried Tylenol which has not helped  Associated Features: Denies numbness tingling shooting burning or electric pain.  Has weakness with using the arm away from body and above shoulder level.  He supinates his arm and then he can lift better.  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      Patient's past medical, surgical, social and family histories reviewed.     Past Medical History:   Diagnosis Date    Arthritis     Reyes's esophagus with esophagitis 01/01/2008    Esophageal reflux 11/27/2006    Gastro-oesophageal reflux disease     Migraine     Morbid obesity (H) 01/22/2009    Obesity, unspecified     Pure hypercholesterolemia     SPRAIN SHOULDER/ARM NOS 12/28/2005    Tear of medial cartilage or meniscus of Rt knee, current 10/17/2013    Unspecified essential hypertension         Past Surgical History:   Procedure Laterality Date    COLONOSCOPY  04/08/2013    Procedure: COLONOSCOPY;  Colonoscopy, Esophagogastroduodenoscopy with Single Biopsy;  Surgeon: Eddie Marshall MD;  Location: [facility]    COLONOSCOPY N/A 4/2/2024    Procedure: Colonoscopy;  Surgeon: Lisandro Bruce MD;  Location: [facility]    ESOPHAGOSCOPY, GASTROSCOPY, DUODENOSCOPY (EGD), COMBINED  04/08/2013    Procedure: COMBINED ESOPHAGOSCOPY, GASTROSCOPY, DUODENOSCOPY (EGD), BIOPSY SINGLE OR MULTIPLE;;  Surgeon: Eddie Marshall MD;  Location: [facility]    ESOPHAGOSCOPY, GASTROSCOPY, DUODENOSCOPY (EGD), COMBINED N/A 01/25/2017    Procedure: COMBINED ESOPHAGOSCOPY, GASTROSCOPY, DUODENOSCOPY (EGD), BIOPSY SINGLE OR MULTIPLE;  Surgeon: Kodi Ng MD;  Location:  GI    EYE SURGERY  2018    [facility] UGI ENDOSCOPY DIAG W BIOPSY  12/5/2008, 03/12/10    OPEN REDUCTION INTERNAL FIXATION WRIST Right 02/01/2016    REMOVE NAIL BED/FINGER TIP Left 05/01/2016    ROTATOR CUFF REPAIR RT/LT Right 03/01/2016       Medications:  Current Outpatient Medications   Medication Sig Dispense Refill    Cholecalciferol (VITAMIN D3 PO) Take 1,000 Units by mouth daily      clonazePAM (KLONOPIN) 0.5 MG tablet TAKE ONE TABLET BY MOUTH TWICE A DAY AS NEEDED FOR ANXIETY 60 tablet 5    hydrochlorothiazide (HYDRODIURIL) 25 MG tablet Take 1 tablet (25 mg) by mouth daily 90 tablet 3    lisinopril (ZESTRIL) 20 MG tablet Take 1 tablet (20  mg) by mouth daily. 90 tablet 0    MULTI VITAMIN MENS OR TABS 1 tablet daily      omeprazole (PRILOSEC) 40 MG DR capsule TAKE 1 CAPSULE DAILY 30 TO 60  MINUTES BEFORE A MEAL 90 capsule 1    pravastatin (PRAVACHOL) 80 MG tablet Take 1 tablet (80 mg) by mouth daily 90 tablet 3    bisacodyl (DULCOLAX) 5 MG EC tablet Take two (2) tablet at 4 pm the day before your procedure.  If your procedure is before 11 am, take two (2) additional tablets at 8 pm.  If your procedure is after 11 am, take two (2) additional tablets at 6 am. For additional instructions refer to your colonoscopy prep instructions. 4 tablet 0    econazole nitrate 1 % external cream Apply topically daily To toenails. (Patient not taking: Reported on 12/2/2024) 85 g 5    metroNIDAZOLE (METROCREAM) 0.75 % external cream APPLY TO AFFECTED AREA(S) TWO TIMES A DAY (Patient not taking: Reported on 12/2/2024) 45 g 4    polyethylene glycol (MIRALAX) 17 GM/Dose powder - Mix 8.3 oz of miralax powder with 64 oz of gatorade or other sport drink (no red or purple).  For additional instructions refer to your colonoscopy prep instructions. 238 g 0    SUMAtriptan (IMITREX) 100 MG tablet TAKE 1 TABLET BY MOUTH AT ONSET OF MIGRAINE MAY REPEAT IN 2 HOURS IF HEADACHE IS NOT GONE (MAX DOSE 2 TABLETS IN 24 HOURS) (Patient not taking: Reported on 12/2/2024) 18 tablet 4    SUMAtriptan Succinate Refill (IMITREX) 6 MG/0.5ML SOCT INJECT 0.5ML FOR ONE DOSE. MAY REPEAT IN 1 HR, DO NOT EXCEED 2 SHOTS IN 24 HOURS (Patient not taking: Reported on 12/2/2024) 2 mL 4     No current facility-administered medications for this visit.       Allergies   Allergen Reactions    Tramadol      Anxiety        Social History     Occupational History    Occupation: [employer]     Comment: [employer].   Tobacco Use    Smoking status: Never    Smokeless tobacco: Never    Tobacco comments:     I've never smoked in my life, anything   Vaping Use    Vaping status: Never Used   Substance  and Sexual Activity    Alcohol use: Yes     Comment: rare    Drug use: Never    Sexual activity: Yes     Partners: Female     Birth control/protection: None       Family History   Problem Relation Age of Onset    Colon Cancer Mother     Heart Disease Father         alcohol dependant,  at age 52    Lipids Father     Hypertension Father     Arthritis Sister         rheumatiod arthritis    Coronary Artery Disease Brother     Cerebrovascular Disease Brother         had an aneurysm    Alcoholism Daughter     Lymphoma Son 39        follicular lymphoma, in remission       REVIEW OF SYSTEMS  10 point review systems performed otherwise negative as noted as per history of present illness.    Physical Exam:  Vitals: /72 (BP Location: Left arm, Cuff Size: Adult Regular)   Pulse 81   Temp 97.8  F (36.6  C) (Temporal)   Ht 1.829 m (6')   Wt 125.6 kg (277 lb)   BMI 37.57 kg/m    BMI= Body mass index is 37.57 kg/m .    Constitutional: healthy, alert and no acute distress   Psychiatric: mentation appears normal and affect normal/bright  NEURO: no focal deficits, CMS intact left upper extremity   RESP: Normal with easy respirations and no use of accessory muscles to breathe, no audible wheezing or retractions  CV: +2 radial pulse and his hand is warm to palpation.   SKIN: No erythema, rashes, excoriation, or breakdown. No evidence of infection.   MUSCULOSKELETAL:  INSPECTION of left shoulder: No gross deformities, erythema, edema, ecchymosis, atrophy or fasciculations.   PALPATION: Slight tenderness to palpation of the proximal biceps tendon.  No tenderness AC joint, clavicle, lateral shoulder, posterior shoulder, trapezius area. No increased warmth noted.   ROM: Passive: Forward flexion to 180 , abduction to 180 , external rotation to 50 , internal rotation to lumbar spine.  All range of motion is without catching, locking or pain.  All range of motion is symmetrical except I did not check internal rotation the  contralateral side.  STRENGTH: 5 out of 5 , deltoid as well as internal and external rotation. 5 out of 5 supraspinatus, infraspinatus and subscapularis without pain.  Strength is symmetrical with external rotation  SPECIAL TEST: Patient has a negative cross over test, negative bear hug test and negative liftoff test, negative empty can and full can test, negative external rotator lag sign, negative drop test, negative apprehension and negative crank test  GAIT: non-antalgic  Lymph: no palpable lymph nodes    Diagnostic Modalities:  X-rays done today showing downsloping acromion but no high riding humerus.  Acceptable spacing glenohumeral joint and also the AC joint.  No fracture no dislocation no tumor.    Reviewed ultrasound that was done 10/28/2021 showing near full-thickness tear of anterior supraspinatus.    Independent visualization of the images was performed.    Impression: 1.  Left shoulder near full-thickness tear anterior supraspinatus.  2.  Left shoulder subacromial impingement.    Plan:  All of the above pertinent physical exam and imaging modalities findings was reviewed with [facility]    Large Joint Injection/Arthocentesis: L subacromial bursa    Date/Time: 12/2/2024 8:35 AM    Performed by: Lisandro Yanez PA-C  Authorized by: Lisandro Yanez PA-C    Indications:  Pain  Needle Size:  22 G  Guidance: landmark guided    Approach:  Posterolateral  Location:  Shoulder      Site:  L subacromial bursa  Medications:  3 mL BUPivacaine 0.5 %; 40 mg triamcinolone 40 MG/ML  Aspirate amount (mL):  0  Procedure discussed: discussed risks, benefits, and alternatives    Consent Given by:  Patient  Timeout: timeout called immediately prior to procedure    Prep: patient was prepped and draped in usual sterile fashion     The skin was prepped with betadine. The patient was in a seated position. I used teresita chloride spray prior to doing the injection.  The patient tolerated the injection well, and there were no  complications. The injection site was covered with a Band-Aid.       FOCUSED PLAN:  He had about 2 years of relief from last steroid injection done by the [facility].  He did a home program of therapy at that time he did not feel helped that much.  On exam patient seems to be compensating very well with his rotator cuff thus I want to give him a better trial of formal therapy to really focus on the supraspinatus that was found to have  near full-thickness tear on ultrasound in 2021.  We proceeded with a steroid injection in the subacromial space today and ordered formal physical therapy to focus on the supraspinatus and not to do home program instead seeing live.  If he were to fail that then we would discuss possible ultrasound again of the rotator cuff or open sided MRI or antianxiety medication with closed MRI secondary to his claustrophobia.  Follow-up as needed.    Re-x-ray on return: No      This note was dictated with [employer].    Lisandro Yanez PA-C

## 2024-12-02 NOTE — LETTER
12/2/2024      Ruddy Browning  [id number] [facility] [id number]      Dear Colleague,    Thank you for referring your patient, Ruddy Browning, to the [facility]. Please see a copy of my visit note below.    ORTHOPEDIC CONSULT      Chief Complaint: Ruddy Browning is a 64 year old male    He is being seen for   Chief Complaint   Patient presents with     Shoulder Pain     Left     Consult         History of Present Illness:   Mechanism of Injury: About 2 months ago patient was using shoulder and felt a pop in the shoulder and had pain and less strength.  This eventually after 3 days got better.  Location: Left lateral anterior shoulder  Duration of Pain: Approximately 6 years intermittently.  Worse over the last 2 months  Rating of Pain: 0 out of 10 currently but can be mild to moderate  Pain Quality: Achy and weakness  Pain is better with: Steroid injection and rotating his arm then he can left  Pain is worse with: Using arm away from body  Treatment so far consists of: Patient was last seen on 11/1/2021 by Dr. Taylor and at that time ultrasound came back showing near full-thickness tear of anterior supraspinatus which was the report of the ultrasound as the patient is very claustrophobic and could not do an MRI.  Patient did receive a steroid injection at that time which lasted about 2 years.  Patient was advised to do physical therapy also.  Did 2 sessions of therapy and then did a home exercise program which she felt did not help.  He has not done ibuprofen because they do not keep them in the house and he has not done any topicals.  He has tried Tylenol which has not helped  Associated Features: Denies numbness tingling shooting burning or electric pain.  Has weakness with using the arm away from body and above shoulder level.  He supinates his arm and then he can lift better.  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      Patient's past medical, surgical, social and family histories reviewed.     Past Medical History:   Diagnosis Date     Arthritis      Reyes's esophagus with esophagitis 01/01/2008     Esophageal reflux 11/27/2006     Gastro-oesophageal reflux disease      Migraine      Morbid obesity (H) 01/22/2009     Obesity, unspecified      Pure hypercholesterolemia      SPRAIN SHOULDER/ARM NOS 12/28/2005     Tear of medial cartilage or meniscus of Rt knee, current 10/17/2013     Unspecified essential hypertension         Past Surgical History:   Procedure Laterality Date     COLONOSCOPY  04/08/2013    Procedure: COLONOSCOPY;  Colonoscopy, Esophagogastroduodenoscopy with Single Biopsy;  Surgeon: Eddie Marshall MD;  Location:  [facility]     COLONOSCOPY N/A 4/2/2024    Procedure: Colonoscopy;  Surgeon: Lisandro Bruce MD;  Location:  GI     ESOPHAGOSCOPY, GASTROSCOPY, DUODENOSCOPY (EGD), COMBINED  04/08/2013    Procedure: COMBINED ESOPHAGOSCOPY, GASTROSCOPY, DUODENOSCOPY (EGD), BIOPSY SINGLE OR MULTIPLE;;  Surgeon: Eddie Marshall MD;  Location:  GI     ESOPHAGOSCOPY, GASTROSCOPY, DUODENOSCOPY (EGD), COMBINED N/A 01/25/2017    Procedure: COMBINED ESOPHAGOSCOPY, GASTROSCOPY, DUODENOSCOPY (EGD), BIOPSY SINGLE OR MULTIPLE;  Surgeon: Kodi Ng MD;  Location:  GI     EYE SURGERY  2018    [facility] UGI ENDOSCOPY DIAG W BIOPSY  12/5/2008, 03/12/10     OPEN REDUCTION INTERNAL FIXATION WRIST Right 02/01/2016     REMOVE NAIL BED/FINGER TIP Left 05/01/2016     ROTATOR CUFF REPAIR RT/LT Right 03/01/2016       Medications:  Current Outpatient Medications   Medication Sig Dispense Refill     Cholecalciferol (VITAMIN D3 PO) Take 1,000 Units by mouth daily        clonazePAM (KLONOPIN) 0.5 MG tablet TAKE ONE TABLET BY MOUTH TWICE A DAY AS NEEDED FOR ANXIETY 60 tablet 5     hydrochlorothiazide (HYDRODIURIL) 25 MG tablet Take 1 tablet (25 mg) by mouth daily 90 tablet 3     lisinopril (ZESTRIL) 20 MG tablet Take 1 tablet (20 mg) by mouth daily. 90 tablet 0     MULTI VITAMIN MENS OR TABS 1 tablet daily       omeprazole (PRILOSEC) 40 MG DR capsule TAKE 1 CAPSULE DAILY 30 TO 60  MINUTES BEFORE A MEAL 90 capsule 1     pravastatin (PRAVACHOL) 80 MG tablet Take 1 tablet (80 mg) by mouth daily 90 tablet 3     bisacodyl (DULCOLAX) 5 MG EC tablet Take two (2) tablet at 4 pm the day before your procedure.  If your procedure is before 11 am, take two (2) additional tablets at 8 pm.  If your procedure is after 11 am, take two (2) additional tablets at 6 am. For additional instructions refer to your colonoscopy prep instructions. 4 tablet 0     econazole nitrate 1 % external cream Apply topically daily To toenails. (Patient not taking: Reported on 12/2/2024) 85 g 5     metroNIDAZOLE (METROCREAM) 0.75 % external cream APPLY TO AFFECTED AREA(S) TWO TIMES A DAY (Patient not taking: Reported on 12/2/2024) 45 g 4     polyethylene glycol (MIRALAX) 17 GM/Dose powder - Mix 8.3 oz of miralax powder with 64 oz of gatorade or other sport drink (no red or purple).  For additional instructions refer to your colonoscopy prep instructions. 238 g 0     SUMAtriptan (IMITREX) 100 MG tablet TAKE 1 TABLET BY MOUTH AT ONSET OF MIGRAINE MAY REPEAT IN 2 HOURS IF HEADACHE IS NOT GONE (MAX DOSE 2 TABLETS IN 24 HOURS) (Patient not taking: Reported on 12/2/2024) 18 tablet 4     SUMAtriptan Succinate Refill (IMITREX) 6 MG/0.5ML SOCT INJECT 0.5ML FOR ONE DOSE. MAY REPEAT IN 1 HR, DO NOT EXCEED 2 SHOTS IN 24 HOURS (Patient not taking: Reported on 12/2/2024) 2 mL 4     No current facility-administered medications for this visit.       Allergies   Allergen Reactions     Tramadol      Anxiety        Social History      Occupational History     Occupation:      Comment: [employer].   Tobacco Use     Smoking status: Never     Smokeless tobacco: Never     Tobacco comments:     I've never smoked in my life, anything   Vaping Use     Vaping status: Never Used   Substance and Sexual Activity     Alcohol use: Yes     Comment: rare     Drug use: Never     Sexual activity: Yes     Partners: Female     Birth control/protection: None       Family History   Problem Relation Age of Onset     Colon Cancer Mother      Heart Disease Father         alcohol dependant,  at age 52     Lipids Father      Hypertension Father      Arthritis Sister         rheumatiod arthritis     Coronary Artery Disease Brother      Cerebrovascular Disease Brother         had an aneurysm     Alcoholism Daughter      Lymphoma Son 39        follicular lymphoma, in remission       REVIEW OF SYSTEMS  10 point review systems performed otherwise negative as noted as per history of present illness.    Physical Exam:  Vitals: /72 (BP Location: Left arm, Cuff Size: Adult Regular)   Pulse 81   Temp 97.8  F (36.6  C) (Temporal)   Ht 1.829 m (6')   Wt 125.6 kg (277 lb)   BMI 37.57 kg/m    BMI= Body mass index is 37.57 kg/m .    Constitutional: healthy, alert and no acute distress   Psychiatric: mentation appears normal and affect normal/bright  NEURO: no focal deficits, CMS intact left upper extremity   RESP: Normal with easy respirations and no use of accessory muscles to breathe, no audible wheezing or retractions  CV: +2 radial pulse and his hand is warm to palpation.   SKIN: No erythema, rashes, excoriation, or breakdown. No evidence of infection.   MUSCULOSKELETAL:  INSPECTION of left shoulder: No gross deformities, erythema, edema, ecchymosis, atrophy or fasciculations.   PALPATION: Slight tenderness to palpation of the proximal biceps tendon.  No tenderness AC joint, clavicle, lateral shoulder, posterior shoulder, trapezius  area. No increased warmth noted.   ROM: Passive: Forward flexion to 180 , abduction to 180 , external rotation to 50 , internal rotation to lumbar spine.  All range of motion is without catching, locking or pain.  All range of motion is symmetrical except I did not check internal rotation the contralateral side.  STRENGTH: 5 out of 5 , deltoid as well as internal and external rotation. 5 out of 5 supraspinatus, infraspinatus and subscapularis without pain.  Strength is symmetrical with external rotation  SPECIAL TEST: Patient has a negative cross over test, negative bear hug test and negative liftoff test, negative empty can and full can test, negative external rotator lag sign, negative drop test, negative apprehension and negative crank test  GAIT: non-antalgic  Lymph: no palpable lymph nodes    Diagnostic Modalities:  X-rays done today showing downsloping acromion but no high riding humerus.  Acceptable spacing glenohumeral joint and also the AC joint.  No fracture no dislocation no tumor.    Reviewed ultrasound that was done 10/28/2021 showing near full-thickness tear of anterior supraspinatus.    Independent visualization of the images was performed.    Impression: 1.  Left shoulder near full-thickness tear anterior supraspinatus.  2.  Left shoulder subacromial impingement.    Plan:  All of the above pertinent physical exam and imaging modalities findings was reviewed with Ruddy.    Large Joint Injection/Arthocentesis: L subacromial bursa    Date/Time: 12/2/2024 8:35 AM    Performed by: Lisandro Yanez PA-C  Authorized by: Lisandro Yanez PA-C    Indications:  Pain  Needle Size:  22 G  Guidance: landmark guided    Approach:  Posterolateral  Location:  Shoulder      Site:  L subacromial bursa  Medications:  3 mL BUPivacaine 0.5 %; 40 mg triamcinolone 40 MG/ML  Aspirate amount (mL):  0  Procedure discussed: discussed risks, benefits, and alternatives    Consent Given by:  Patient  Timeout: timeout called  immediately prior to procedure    Prep: patient was prepped and draped in usual sterile fashion     The skin was prepped with betadine. The patient was in a seated position. I used teresita chloride spray prior to doing the injection.  The patient tolerated the injection well, and there were no complications. The injection site was covered with a Band-Aid.       FOCUSED PLAN:  He had about 2 years of relief from last steroid injection done by the [facility] team.  He did a home program of therapy at that time he did not feel helped that much.  On exam patient seems to be compensating very well with his rotator cuff thus I want to give him a better trial of formal therapy to really focus on the supraspinatus that was found to have  near full-thickness tear on ultrasound in 2021.  We proceeded with a steroid injection in the subacromial space today and ordered formal physical therapy to focus on the supraspinatus and not to do home program instead seeing live.  If he were to fail that then we would discuss possible ultrasound again of the rotator cuff or open sided MRI or antianxiety medication with closed MRI secondary to his claustrophobia.  Follow-up as needed.    Re-x-ray on return: No      This note was dictated with [employer].    Lisandro Yanez PA-C        Again, thank you for allowing me to participate in the care of your patient.        Sincerely,        Lisandro Yanez PA-C

## 2024-12-03 RX ORDER — OMEPRAZOLE 40 MG/1
CAPSULE, DELAYED RELEASE ORAL
Qty: 90 CAPSULE | Refills: 0 | Status: SHIPPED | OUTPATIENT
Start: 2024-12-03

## 2024-12-11 DIAGNOSIS — I10 ESSENTIAL HYPERTENSION, BENIGN: ICD-10-CM

## 2024-12-11 RX ORDER — HYDROCHLOROTHIAZIDE 25 MG/1
25 TABLET ORAL DAILY
Qty: 90 TABLET | Refills: 3 | OUTPATIENT
Start: 2024-12-11

## 2024-12-12 DIAGNOSIS — I10 ESSENTIAL HYPERTENSION, BENIGN: ICD-10-CM

## 2024-12-12 RX ORDER — HYDROCHLOROTHIAZIDE 25 MG/1
25 TABLET ORAL DAILY
Qty: 90 TABLET | Refills: 3 | OUTPATIENT
Start: 2024-12-12

## 2024-12-14 DIAGNOSIS — F41.1 GAD (GENERALIZED ANXIETY DISORDER): ICD-10-CM

## 2024-12-14 DIAGNOSIS — G47.09 OTHER INSOMNIA: ICD-10-CM

## 2024-12-16 ENCOUNTER — THERAPY VISIT (OUTPATIENT)
Dept: PHYSICAL THERAPY | Facility: CLINIC | Age: 64
End: 2024-12-16
Attending: PHYSICIAN ASSISTANT
Payer: COMMERCIAL

## 2024-12-16 DIAGNOSIS — M75.112 NONTRAUMATIC INCOMPLETE TEAR OF LEFT ROTATOR CUFF: ICD-10-CM

## 2024-12-16 DIAGNOSIS — M75.42 SUBACROMIAL IMPINGEMENT, LEFT: Primary | ICD-10-CM

## 2024-12-16 PROCEDURE — 97530 THERAPEUTIC ACTIVITIES: CPT | Mod: GP | Performed by: PHYSICAL THERAPIST

## 2024-12-16 PROCEDURE — 97110 THERAPEUTIC EXERCISES: CPT | Mod: GP | Performed by: PHYSICAL THERAPIST

## 2024-12-16 PROCEDURE — 97161 PT EVAL LOW COMPLEX 20 MIN: CPT | Mod: GP | Performed by: PHYSICAL THERAPIST

## 2024-12-16 ASSESSMENT — ACTIVITIES OF DAILY LIVING (ADL)
WASHING_YOUR_HAIR?: 2
WASHING_YOUR_BACK: 2
TOUCHING_THE_BACK_OF_YOUR_NECK: 0
WHEN_LYING_ON_THE_INVOLVED_SIDE: 1
PLACING_AN_OBJECT_ON_A_HIGH_SHELF: 9
PUTTING_ON_YOUR_PANTS: 0
REACHING_FOR_SOMETHING_ON_A_HIGH_SHELF: 6
REMOVING_SOMETHING_FROM_YOUR_BACK_POCKET: 0
CARRYING_A_HEAVY_OBJECT_OF_10_POUNDS: 0
AT_ITS_WORST?: 1
PUTTING_ON_A_SHIRT_THAT_BUTTONS_DOWN_THE_FRONT: 0
PLEASE_INDICATE_YOR_PRIMARY_REASON_FOR_REFERRAL_TO_THERAPY:: SHOULDER
PUTTING_ON_AN_UNDERSHIRT_OR_A_PULLOVER_SWEATER: 1
PUSHING_WITH_THE_INVOLVED_ARM: 0

## 2024-12-16 NOTE — PROGRESS NOTES
PHYSICAL THERAPY EVALUATION  Type of Visit: Evaluation       Fall Risk Screen:  Fall screen completed by: PT  Have you fallen 2 or more times in the past year?: No  Have you fallen and had an injury in the past year?: No  Is patient a fall risk?: No    Subjective         Presenting condition or subjective complaint: (Patient-Rptd) sore shoulder on L. About 2.5 months ago patient was using shoulder and felt a pop in the shoulder and had pain and less strength. Shoulder pain x 6 months likely related to his job and overhead lifting of heavy things/pipe. Retired 2020 and sx got better. Patient was last seen on 11/1/2021 by Dr. Taylor and at that time ultrasound came back showing near full-thickness tear of anterior supraspinatus which was the report of the ultrasound as the patient is very claustrophobic and could not do an MRI.  Patient did receive a steroid injection at that time which lasted about 2 years.  Patient was advised to do physical therapy also.  Did 2 sessions of therapy and then did a home exercise program which he felt did not help.  He has not done ibuprofen because they do not keep them in the house and he has not done any topicals.  He has tried Tylenol which has not helped. Pt is worse now with lifting with his palm up.  Pt had L shoulder injection 12/2/24 with pt noting it helped about 50%.   Date of onset: 10/02/24 (approximately 2.5 months ago)    Relevant medical history: (Patient-Rptd) Arthritis; Depression; High blood pressure; Migraines or headaches; Overweight   Dates & types of surgery: (Patient-Rptd) wrist surgery february 2016 right rotator cuff april 2016 left knee 2011?    Prior diagnostic imaging/testing results: (Patient-Rptd) X-ray; Other (Patient-Rptd) ultrasound . X ray 12/2/24: IMPRESSION: Mild degenerative arthrosis of the acromioclavicular and  glenohumeral joints. No definite fracture. Lateral downsloping of the  acromion slightly narrows the subacromial space.  2021 US:  Impression:      Full-thickness or near full-thickness tearing of the anterior  supraspinatus at the footprint.  Prior therapy history for the same diagnosis, illness or injury: (Patient-Rptd) No      Prior Level of Function  Transfers: Independent  Ambulation: Independent  ADL: Independent  IADL: Driving, Finances, Housekeeping, Meal preparation, Medication management, Work, Yard work    Living Environment  Social support: (Patient-Rptd) With a significant other or spouse   Type of home: (Patient-Rptd) House; 1 level   Stairs to enter the home: (Patient-Rptd) No       Ramp: (Patient-Rptd) No   Stairs inside the home: (Patient-Rptd) No       Help at home: (Patient-Rptd) None  Equipment owned:       Employment: (Patient-Rptd) Yes (Patient-Rptd) retired from career job but own a food truck  Hobbies/Interests: (Patient-Rptd) [employer]    Patient goals for therapy: (Patient-Rptd) lift something with palms up    Pain assessment: Pain present  See objective evaluation for additional pain details     Objective   SHOULDER EVALUATION  PAIN: Pain Level at Rest: 0/10  Pain Level with Use: 6/10  Pain Quality: Dull, Sharp, Other, and catching over BC tendon  Pain Frequency: intermittent or 10% of day, some days none  Pain is Exacerbated By: lifting with palm up, reaching wrong  Pain is Relieved By: rest  Pain Progression: improved since injection  INTEGUMENTARY (edema, incisions):  has R rotator cuff atrophy compared to L, L posterior deltoid atrophy, L bicep deformity compared to R  POSTURE:  R>L rounded shoulder , increased thoracic kyphosis  GAIT:   Weightbearing Status:   Assistive Device(s): None  Gait Deviations: WFL  BALANCE/PROPRIOCEPTION:   WEIGHTBEARING ALIGNMENT:   ROM:   (Degrees) Left AROM Left PROM Right AROM  Right PROM   Shoulder Flexion 150  160 1/10    Shoulder Extension       Shoulder Abduction 160 2/10  146    Shoulder Adduction       Shoulder Internal Rotation T8 2/10 bicep pain  T9 1/10 with reach  behind    Shoulder External Rotation 50 1/10 pain over sabas  57    Shoulder Horizontal Abduction       Shoulder Horizontal Adduction       Shoulder Flexion ER       Shoulder Flexion IR       Elbow Extension       Elbow Flexion       Pain:   End feel:   STRENGTH:  B weakness ER 4+/5, L abduction 4+/5 pain  FLEXIBILITY:   SPECIAL TESTS: poor shoulder movement patter with shoulder elevation, excessive upper trap activation  PALPATION:   JOINT MOBILITY:   CERVICAL SCREEN:     Assessment & Plan   CLINICAL IMPRESSIONS  Medical Diagnosis: Subacromial impingement, left, Nontraumatic incomplete tear of left rotator cuff    Treatment Diagnosis: Left subacromial impingement,  tear of left rotator cuff, L shoulder pain   Impression/Assessment: Patient is a 64 year old male with L shoulder pain complaints.  The following significant findings have been identified: Pain, Decreased ROM/flexibility, Decreased strength, Impaired muscle performance, Decreased activity tolerance, and Impaired posture. These impairments interfere with their ability to perform self care tasks, work tasks, recreational activities, and household chores as compared to previous level of function.     Clinical Decision Making (Complexity):  Clinical Presentation: Stable/Uncomplicated  Clinical Presentation Rationale: based on medical and personal factors listed in PT evaluation  Clinical Decision Making (Complexity): Low complexity    PLAN OF CARE  Treatment Interventions:  Modalities: Ultrasound, if needed  Interventions: Manual Therapy, Neuromuscular Re-education, Therapeutic Activity, Therapeutic Exercise    Long Term Goals     PT Goal 1  Goal Identifier: Home Program  Goal Description: Pt have good understanding of HEP to improve L shoulder ROM and strength so pt can continue to work with his food truck but have less pain  Target Date: 02/16/25  PT Goal 2  Goal Identifier: Function  Goal Description: Pt to note a further decrease in L shoulder pain and  improve strength and carry over to improved functional level as measured by spadi score decrease from 20.72% to 11% or less  Target Date: 02/16/25      Frequency of Treatment: every 2-3 weeks  Duration of Treatment: 3 months, decrease as able    Recommended Referrals to Other Professionals:   Education Assessment:   Learner/Method: Patient;Listening;Reading;Demonstration;Pictures/Video;No Barriers to Learning  Education Comments: home program    Risks and benefits of evaluation/treatment have been explained.   Patient/Family/caregiver agrees with Plan of Care.     Evaluation Time:     PT Eval, Low Complexity Minutes (83821): 25       Signing Clinician: Eddie Thompson PT

## 2024-12-17 RX ORDER — CLONAZEPAM 0.5 MG/1
TABLET ORAL
Qty: 60 TABLET | Refills: 0 | Status: SHIPPED | OUTPATIENT
Start: 2024-12-17

## 2025-01-07 ENCOUNTER — OFFICE VISIT (OUTPATIENT)
Dept: FAMILY MEDICINE | Facility: OTHER | Age: 65
End: 2025-01-07
Payer: COMMERCIAL

## 2025-01-07 VITALS
HEART RATE: 56 BPM | TEMPERATURE: 97.6 F | DIASTOLIC BLOOD PRESSURE: 72 MMHG | SYSTOLIC BLOOD PRESSURE: 130 MMHG | HEIGHT: 72 IN | BODY MASS INDEX: 36.57 KG/M2 | WEIGHT: 270 LBS | OXYGEN SATURATION: 99 %

## 2025-01-07 DIAGNOSIS — E78.2 ELEVATED CHOLESTEROL WITH ELEVATED TRIGLYCERIDES: ICD-10-CM

## 2025-01-07 DIAGNOSIS — I10 ESSENTIAL HYPERTENSION, BENIGN: ICD-10-CM

## 2025-01-07 DIAGNOSIS — I10 BENIGN ESSENTIAL HYPERTENSION: ICD-10-CM

## 2025-01-07 DIAGNOSIS — K22.70 BARRETT'S ESOPHAGUS WITHOUT DYSPLASIA: ICD-10-CM

## 2025-01-07 DIAGNOSIS — Z00.00 ROUTINE GENERAL MEDICAL EXAMINATION AT A HEALTH CARE FACILITY: Primary | ICD-10-CM

## 2025-01-07 DIAGNOSIS — E66.812 CLASS 2 OBESITY WITH BODY MASS INDEX (BMI) OF 36.0 TO 36.9 IN ADULT, UNSPECIFIED OBESITY TYPE, UNSPECIFIED WHETHER SERIOUS COMORBIDITY PRESENT: ICD-10-CM

## 2025-01-07 LAB
ANION GAP SERPL CALCULATED.3IONS-SCNC: 11 MMOL/L (ref 7–15)
BUN SERPL-MCNC: 22.7 MG/DL (ref 8–23)
CALCIUM SERPL-MCNC: 9.4 MG/DL (ref 8.8–10.4)
CHLORIDE SERPL-SCNC: 101 MMOL/L (ref 98–107)
CHOLEST SERPL-MCNC: 184 MG/DL
CREAT SERPL-MCNC: 1.07 MG/DL (ref 0.67–1.17)
EGFRCR SERPLBLD CKD-EPI 2021: 78 ML/MIN/1.73M2
FASTING STATUS PATIENT QL REPORTED: NO
FASTING STATUS PATIENT QL REPORTED: NO
GLUCOSE SERPL-MCNC: 104 MG/DL (ref 70–99)
HCO3 SERPL-SCNC: 26 MMOL/L (ref 22–29)
HDLC SERPL-MCNC: 42 MG/DL
LDLC SERPL CALC-MCNC: 114 MG/DL
NONHDLC SERPL-MCNC: 142 MG/DL
POTASSIUM SERPL-SCNC: 3.8 MMOL/L (ref 3.4–5.3)
SODIUM SERPL-SCNC: 138 MMOL/L (ref 135–145)
TRIGL SERPL-MCNC: 142 MG/DL
TSH SERPL DL<=0.005 MIU/L-ACNC: 1.72 UIU/ML (ref 0.3–4.2)

## 2025-01-07 PROCEDURE — 80061 LIPID PANEL: CPT

## 2025-01-07 PROCEDURE — 80048 BASIC METABOLIC PNL TOTAL CA: CPT

## 2025-01-07 PROCEDURE — 36415 COLL VENOUS BLD VENIPUNCTURE: CPT

## 2025-01-07 PROCEDURE — 84443 ASSAY THYROID STIM HORMONE: CPT

## 2025-01-07 RX ORDER — OMEPRAZOLE 40 MG/1
CAPSULE, DELAYED RELEASE ORAL
Qty: 289 CAPSULE | Refills: 3 | Status: SHIPPED | OUTPATIENT
Start: 2025-01-07

## 2025-01-07 RX ORDER — LISINOPRIL AND HYDROCHLOROTHIAZIDE 20; 25 MG/1; MG/1
1 TABLET ORAL DAILY
Qty: 90 TABLET | Refills: 3 | Status: SHIPPED | OUTPATIENT
Start: 2025-01-07

## 2025-01-07 RX ORDER — LISINOPRIL 20 MG/1
20 TABLET ORAL DAILY
Qty: 90 TABLET | Refills: 0 | Status: CANCELLED | OUTPATIENT
Start: 2025-01-07

## 2025-01-07 SDOH — HEALTH STABILITY: PHYSICAL HEALTH: ON AVERAGE, HOW MANY MINUTES DO YOU ENGAGE IN EXERCISE AT THIS LEVEL?: 20 MIN

## 2025-01-07 SDOH — HEALTH STABILITY: PHYSICAL HEALTH: ON AVERAGE, HOW MANY DAYS PER WEEK DO YOU ENGAGE IN MODERATE TO STRENUOUS EXERCISE (LIKE A BRISK WALK)?: 2 DAYS

## 2025-01-07 ASSESSMENT — PAIN SCALES - GENERAL: PAINLEVEL_OUTOF10: NO PAIN (0)

## 2025-01-07 ASSESSMENT — SOCIAL DETERMINANTS OF HEALTH (SDOH): HOW OFTEN DO YOU GET TOGETHER WITH FRIENDS OR RELATIVES?: MORE THAN THREE TIMES A WEEK

## 2025-01-07 NOTE — PATIENT INSTRUCTIONS
Patient Education   Preventive Care Advice   This is general advice given by our system to help you stay healthy. However, your care team may have specific advice just for you. Please talk to your care team about your preventive care needs.  Nutrition  Eat 5 or more servings of fruits and vegetables each day.  Try wheat bread, brown rice and whole grain pasta (instead of white bread, rice, and pasta).  Get enough calcium and vitamin D. Check the label on foods and aim for 100% of the RDA (recommended daily allowance).  Lifestyle  Exercise at least 150 minutes each week  (30 minutes a day, 5 days a week).  Do muscle strengthening activities 2 days a week. These help control your weight and prevent disease.  No smoking.  Wear sunscreen to prevent skin cancer.  Have a dental exam and cleaning every 6 months.  Yearly exams  See your health care team every year to talk about:  Any changes in your health.  Any medicines your care team has prescribed.  Preventive care, family planning, and ways to prevent chronic diseases.  Shots (vaccines)   HPV shots (up to age 26), if you've never had them before.  Hepatitis B shots (up to age 59), if you've never had them before.  COVID-19 shot: Get this shot when it's due.  Flu shot: Get a flu shot every year.  Tetanus shot: Get a tetanus shot every 10 years.  Pneumococcal, hepatitis A, and RSV shots: Ask your care team if you need these based on your risk.  Shingles shot (for age 50 and up)  General health tests  Diabetes screening:  Starting at age 35, Get screened for diabetes at least every 3 years.  If you are younger than age 35, ask your care team if you should be screened for diabetes.  Cholesterol test: At age 39, start having a cholesterol test every 5 years, or more often if advised.  Bone density scan (DEXA): At age 50, ask your care team if you should have this scan for osteoporosis (brittle bones).  Hepatitis C: Get tested at least once in your life.  STIs (sexually  transmitted infections)  Before age 24: Ask your care team if you should be screened for STIs.  After age 24: Get screened for STIs if you're at risk. You are at risk for STIs (including HIV) if:  You are sexually active with more than one person.  You don't use condoms every time.  You or a partner was diagnosed with a sexually transmitted infection.  If you are at risk for HIV, ask about PrEP medicine to prevent HIV.  Get tested for HIV at least once in your life, whether you are at risk for HIV or not.  Cancer screening tests  Cervical cancer screening: If you have a cervix, begin getting regular cervical cancer screening tests starting at age 21.  Breast cancer scan (mammogram): If you've ever had breasts, begin having regular mammograms starting at age 40. This is a scan to check for breast cancer.  Colon cancer screening: It is important to start screening for colon cancer at age 45.  Have a colonoscopy test every 10 years (or more often if you're at risk) Or, ask your provider about stool tests like a FIT test every year or Cologuard test every 3 years.  To learn more about your testing options, visit:   .  For help making a decision, visit:   https://bit.ly/gf08277.  Prostate cancer screening test: If you have a prostate, ask your care team if a prostate cancer screening test (PSA) at age 55 is right for you.  Lung cancer screening: If you are a current or former smoker ages 50 to 80, ask your care team if ongoing lung cancer screenings are right for you.  For informational purposes only. Not to replace the advice of your health care provider. Copyright   2023 [place] [employer] All rights reserved. Clinically reviewed by the [facility]. [employer] 119855 - REV 01/24.

## 2025-01-07 NOTE — PROGRESS NOTES
Preventive Care Visit  [facility]  Jian Falcon DO, Family Practice  Jan 7, 2025      Assessment & Plan     Routine general medical examination at a health care facility    Benign essential hypertension  Stable. Will continue lisinopril 20 mg, hydrochlorothiazide 25 mg. Recheck BMP, TSH today.  - BASIC METABOLIC PANEL; Future  - TSH with free T4 reflex; Future  - lisinopril-hydrochlorothiazide (ZESTORETIC) 20-25 MG tablet; Take 1 tablet by mouth daily.  - BASIC METABOLIC PANEL  - TSH with free T4 reflex    Reyes's esophagus without dysplasia  Continue omeprazole 40 mg daily. He is due for repeat EGD which was recommended to be completed a few years ago.  - omeprazole (PRILOSEC) 40 MG DR capsule; TAKE 1 CAPSULE DAILY 30 TO 60  MINUTES BEFORE A MEAL  - Adult GI  Referral - Procedure Only; Future    Class 2 obesity with body mass index (BMI) of 36.0 to 36.9 in adult, unspecified obesity type, unspecified whether serious comorbidity present  - TSH with free T4 reflex; Future  - TSH with free T4 reflex    Elevated cholesterol with elevated triglycerides  - Lipid panel reflex to direct LDL Non-fasting; Future  - Lipid panel reflex to direct LDL Non-fasting    BMI  Estimated body mass index is 36.62 kg/m  as calculated from the following:    Height as of this encounter: 1.829 m (6').    Weight as of this encounter: 122.5 kg (270 lb).   Weight management plan: Discussed healthy diet and exercise guidelines    Counseling  Appropriate preventive services were addressed with this patient via screening, questionnaire, or discussion as appropriate for fall prevention, nutrition, physical activity, Tobacco-use cessation, social engagement, weight loss and cognition.  Checklist reviewing preventive services available has been given to the patient.  Reviewed patient's diet, addressing concerns and/or questions.   He is at risk for lack of exercise and has been provided with information to  increase physical activity for the benefit of his well-being.   The patient was instructed to see the dentist every 6 months.     Follow-up within 3 months for weight, hypertension.    Mary Fox is a 64 year old, presenting for the following:  Hypertension and Recheck Medication        1/7/2025    10:47 AM   Additional Questions   Roomed by chad          History of Present Illness       Reason for visit:  Yearly physical   He is taking medications regularly.        He is doing okay.    He has had intermittent anxiety and panic, especially related to claustrophobia. He does use clonazepam as needed, he has not used this in several months.    Health Care Directive  Patient does not have a Health Care Directive: deferred      1/7/2025   General Health   How would you rate your overall physical health? Good   Feel stress (tense, anxious, or unable to sleep) Not at all         1/7/2025   Nutrition   Three or more servings of calcium each day? Yes   Diet: Other   If other, please elaborate: keto   How many servings of fruit and vegetables per day? (!) 2-3   How many sweetened beverages each day? 0-1         1/7/2025   Exercise   Days per week of moderate/strenous exercise 2 days   Average minutes spent exercising at this level 20 min   (!) EXERCISE CONCERN      1/7/2025   Social Factors   Frequency of gathering with friends or relatives More than three times a week   Worry food won't last until get money to buy more No   Food not last or not have enough money for food? No   Do you have housing? (Housing is defined as stable permanent housing and does not include staying ouside in a car, in a tent, in an abandoned building, in an overnight shelter, or couch-surfing.) Yes   Are you worried about losing your housing? No   Lack of transportation? No   Unable to get utilities (heat,electricity)? No         1/7/2025   Fall Risk   Fallen 2 or more times in the past year? No    Trouble with walking or balance? No         Proxy-reported          1/7/2025   Dental   Dentist two times every year? (!) NO         1/7/2025   TB Screening   Were you born outside of the [place]? No         Today's PHQ-2 Score:       1/7/2025     5:56 AM   PHQ-2 ( 1999 [employer])   Q1: Little interest or pleasure in doing things 0   Q2: Feeling down, depressed or hopeless 0   PHQ-2 Score 0    Q1: Little interest or pleasure in doing things Not at all   Q2: Feeling down, depressed or hopeless Not at all   PHQ-2 Score 0       Patient-reported           1/7/2025   Substance Use   Alcohol more than 3/day or more than 7/wk No   Do you use any other substances recreationally? No     Social History     Tobacco Use    Smoking status: Never    Smokeless tobacco: Never    Tobacco comments:     I've never smoked in my life, anything   Vaping Use    Vaping status: Never Used   Substance Use Topics    Alcohol use: Yes     Comment: rare    Drug use: Never           1/7/2025   STI Screening   New sexual partner(s) since last STI/HIV test? No   Last PSA:   Prostate Specific Antigen Screen   Date Value Ref Range Status   02/01/2024 1.51 0.00 - 4.50 ng/mL Final     ASCVD Risk   The 10-year ASCVD risk score (Isaura DIAZ, et al., 2019) is: 16.2%    Values used to calculate the score:      Age: 64 years      Sex: Male      Is Non- : No      Diabetic: No      Tobacco smoker: No      Systolic Blood Pressure: 130 mmHg      Is BP treated: Yes      HDL Cholesterol: 38 mg/dL      Total Cholesterol: 213 mg/dL       Reviewed and updated as needed this visit by Provider                     Objective    Exam  /72 (BP Location: Right arm, Cuff Size: Adult Regular)   Pulse 56   Temp 97.6  F (36.4  C) (Temporal)   Ht 1.829 m (6')   Wt 122.5 kg (270 lb)   SpO2 99%   BMI 36.62 kg/m     Estimated body mass index is 36.62 kg/m  as calculated from the following:    Height as of this encounter: 1.829 m (6').    Weight as of this encounter: 122.5 kg (270  lb).    Physical Exam  Constitutional:       General: He is not in acute distress.     Appearance: Normal appearance. He is obese. He is not ill-appearing.   Cardiovascular:      Rate and Rhythm: Normal rate and regular rhythm.   Pulmonary:      Effort: Pulmonary effort is normal.      Breath sounds: Normal breath sounds.   Abdominal:      General: Abdomen is flat.      Palpations: Abdomen is soft.   Neurological:      General: No focal deficit present.      Mental Status: He is alert and oriented to person, place, and time.   Psychiatric:         Mood and Affect: Mood normal.         Behavior: Behavior normal.             Signed Electronically by: Jian Falcon DO

## 2025-01-11 DIAGNOSIS — I10 ESSENTIAL HYPERTENSION, BENIGN: ICD-10-CM

## 2025-01-13 ENCOUNTER — MYC REFILL (OUTPATIENT)
Dept: FAMILY MEDICINE | Facility: OTHER | Age: 65
End: 2025-01-13
Payer: COMMERCIAL

## 2025-01-13 DIAGNOSIS — I10 BENIGN ESSENTIAL HYPERTENSION: ICD-10-CM

## 2025-01-13 NOTE — TELEPHONE ENCOUNTER
Clinic RN: Please investigate patient's chart or contact patient if the information cannot be found because  patient has lisinopril, hydrochlorothiazide, and lisinopril-hydrochlorothiazide active on med list. Please verify if patient should be taking the lisinopril that is being requested, or just the combo tablet. Please route to provider for review/updated med list if needed.      Lucinda Alanis, DARLENEN, RN

## 2025-01-14 RX ORDER — LISINOPRIL AND HYDROCHLOROTHIAZIDE 20; 25 MG/1; MG/1
1 TABLET ORAL DAILY
Qty: 90 TABLET | Refills: 3 | OUTPATIENT
Start: 2025-01-14

## 2025-01-15 RX ORDER — LISINOPRIL 20 MG/1
20 TABLET ORAL DAILY
Qty: 90 TABLET | Refills: 3 | OUTPATIENT
Start: 2025-01-15

## 2025-01-15 NOTE — TELEPHONE ENCOUNTER
Dr. Falcon,     Patient was prescribed adjunct Zestoretic, requesting refill for standalone lisinopril. Please advise how patient to be taking medications and discontinue duplicates.     Jian Cruz RN on 1/15/2025 at 12:37 PM

## 2025-01-16 DIAGNOSIS — F41.1 GAD (GENERALIZED ANXIETY DISORDER): ICD-10-CM

## 2025-01-16 DIAGNOSIS — G47.09 OTHER INSOMNIA: ICD-10-CM

## 2025-01-16 DIAGNOSIS — L71.9 ROSACEA: ICD-10-CM

## 2025-01-16 NOTE — TELEPHONE ENCOUNTER
Jian Falcon DO to [facility] Regarding result: lisinopril (ZESTRIL) 20 MG tablet [Pharmacy Med Name: Lisinopril 20 MG Oral Tablet]   AS    1/15/25  6:10 PM  Patient should only be taking zestoretic and not his previous lisniopril or hydrochlorothiazide. Thank you!    Jian Falcon DO

## 2025-01-20 NOTE — TELEPHONE ENCOUNTER
Looks like he just saw Dr. Falcon in ER for physical like 2 weeks ago, please have Dr. Falcon  to address

## 2025-01-22 RX ORDER — CLONAZEPAM 0.5 MG/1
.25-.5 TABLET ORAL 2 TIMES DAILY PRN
Qty: 30 TABLET | Refills: 0 | Status: SHIPPED | OUTPATIENT
Start: 2025-01-22

## 2025-01-28 DIAGNOSIS — K22.70 BARRETT'S ESOPHAGUS WITHOUT DYSPLASIA: ICD-10-CM

## 2025-01-28 RX ORDER — OMEPRAZOLE 40 MG/1
CAPSULE, DELAYED RELEASE ORAL
Qty: 90 CAPSULE | Refills: 2 | Status: SHIPPED | OUTPATIENT
Start: 2025-01-28

## 2025-02-26 ENCOUNTER — TELEPHONE (OUTPATIENT)
Dept: FAMILY MEDICINE | Facility: OTHER | Age: 65
End: 2025-02-26
Payer: COMMERCIAL

## 2025-02-26 DIAGNOSIS — I10 ESSENTIAL HYPERTENSION, BENIGN: Primary | ICD-10-CM

## 2025-02-26 DIAGNOSIS — E78.5 HYPERLIPIDEMIA LDL GOAL <130: ICD-10-CM

## 2025-02-26 DIAGNOSIS — I10 ESSENTIAL HYPERTENSION, BENIGN: ICD-10-CM

## 2025-02-26 RX ORDER — HYDROCHLOROTHIAZIDE 25 MG/1
25 TABLET ORAL DAILY
Qty: 90 TABLET | Refills: 3 | Status: SHIPPED | OUTPATIENT
Start: 2025-02-26

## 2025-02-26 RX ORDER — PRAVASTATIN SODIUM 80 MG/1
80 TABLET ORAL DAILY
Qty: 90 TABLET | Refills: 3 | Status: SHIPPED | OUTPATIENT
Start: 2025-02-26

## 2025-02-26 RX ORDER — HYDROCHLOROTHIAZIDE 25 MG/1
25 TABLET ORAL DAILY
Qty: 90 TABLET | Refills: 0 | Status: SHIPPED | OUTPATIENT
Start: 2025-02-26

## 2025-02-26 NOTE — TELEPHONE ENCOUNTER
New Pharmacy ([facility]) Requesting Med    pravastatin (PRAVACHOL) 80 MG tablet     lisinopril-hydrochlorothiazide (ZESTORETIC) 20-25 MG tablet     Patient is requesting above meds to be filled with a new pharmacy ([facility]):    3 options for filling script:    ePrescribe to [facility]  Call [facility] pharmacist at [phone]  Fax [facility] at [phone]    [facility] order#: [id number]  NCPDP: [id number]    Royce Conte, OBDULIO on 2/26/2025 at 2:36 PM

## 2025-03-10 ENCOUNTER — PATIENT OUTREACH (OUTPATIENT)
Dept: CARE COORDINATION | Facility: CLINIC | Age: 65
End: 2025-03-10
Payer: COMMERCIAL

## 2025-05-27 DIAGNOSIS — I10 ESSENTIAL HYPERTENSION, BENIGN: ICD-10-CM

## 2025-05-27 RX ORDER — HYDROCHLOROTHIAZIDE 25 MG/1
25 TABLET ORAL DAILY
Qty: 90 TABLET | Refills: 1 | Status: SHIPPED | OUTPATIENT
Start: 2025-05-27

## 2025-06-03 ENCOUNTER — TELEPHONE (OUTPATIENT)
Dept: FAMILY MEDICINE | Facility: OTHER | Age: 65
End: 2025-06-03

## 2025-06-03 NOTE — TELEPHONE ENCOUNTER
INCOMING FORMS    Sender: [facility]    Type of Form, letter or note (What is requested?): order    How was the form received?: Fax    How should forms be returned?:  Fax : [phone]    Form placed in AS bin for review/signature if appropriate.

## 2025-06-03 NOTE — TELEPHONE ENCOUNTER
Patient scheduled follow up appointment. Closing encounter.   Next 5 appointments (look out 90 days)      Jun 12, 2025 10:00 AM  (Arrive by 9:40 AM)  Provider Visit with Jian Falcon DO  [facility] [address] [place] [id number]  [phone]          Rajani Grider MA

## 2025-06-03 NOTE — TELEPHONE ENCOUNTER
----- Message from Jian Falcon sent at 6/3/2025 10:00 AM CDT -----  Please schedule patient for follow-up, med check within 1 month    Jian Falcon DO

## 2025-06-12 ENCOUNTER — OFFICE VISIT (OUTPATIENT)
Dept: FAMILY MEDICINE | Facility: OTHER | Age: 65
End: 2025-06-12
Payer: COMMERCIAL

## 2025-06-12 VITALS
RESPIRATION RATE: 18 BRPM | TEMPERATURE: 97.6 F | HEIGHT: 72 IN | OXYGEN SATURATION: 98 % | WEIGHT: 284 LBS | SYSTOLIC BLOOD PRESSURE: 120 MMHG | HEART RATE: 72 BPM | BODY MASS INDEX: 38.47 KG/M2 | DIASTOLIC BLOOD PRESSURE: 79 MMHG

## 2025-06-12 DIAGNOSIS — F41.1 GAD (GENERALIZED ANXIETY DISORDER): ICD-10-CM

## 2025-06-12 DIAGNOSIS — K22.70 BARRETT'S ESOPHAGUS WITHOUT DYSPLASIA: ICD-10-CM

## 2025-06-12 DIAGNOSIS — E66.813 CLASS 3 OBESITY (H): ICD-10-CM

## 2025-06-12 DIAGNOSIS — I10 ESSENTIAL HYPERTENSION, BENIGN: ICD-10-CM

## 2025-06-12 DIAGNOSIS — G43.909 MIGRAINE WITHOUT STATUS MIGRAINOSUS, NOT INTRACTABLE, UNSPECIFIED MIGRAINE TYPE: ICD-10-CM

## 2025-06-12 DIAGNOSIS — G47.09 OTHER INSOMNIA: ICD-10-CM

## 2025-06-12 PROCEDURE — 99213 OFFICE O/P EST LOW 20 MIN: CPT

## 2025-06-12 PROCEDURE — 3074F SYST BP LT 130 MM HG: CPT

## 2025-06-12 PROCEDURE — 1126F AMNT PAIN NOTED NONE PRSNT: CPT

## 2025-06-12 PROCEDURE — 3078F DIAST BP <80 MM HG: CPT

## 2025-06-12 RX ORDER — SUMATRIPTAN SUCCINATE 100 MG/1
TABLET ORAL
Qty: 20 TABLET | Refills: 2 | Status: SHIPPED | OUTPATIENT
Start: 2025-06-12

## 2025-06-12 RX ORDER — SUMATRIPTAN SUCCINATE 6 MG/.5ML
INJECTION, SOLUTION SUBCUTANEOUS
Qty: 2 ML | Refills: 4 | Status: CANCELLED | OUTPATIENT
Start: 2025-06-12

## 2025-06-12 RX ORDER — SUMATRIPTAN SUCCINATE 6 MG/.5ML
6 INJECTION, SOLUTION SUBCUTANEOUS ONCE
Qty: 2 ML | Refills: 4 | Status: SHIPPED | OUTPATIENT
Start: 2025-06-12 | End: 2025-06-12

## 2025-06-12 RX ORDER — HYDROCHLOROTHIAZIDE 25 MG/1
25 TABLET ORAL DAILY
Qty: 90 TABLET | Refills: 1 | Status: SHIPPED | OUTPATIENT
Start: 2025-06-12

## 2025-06-12 RX ORDER — CLONAZEPAM 0.5 MG/1
.25-.5 TABLET ORAL 2 TIMES DAILY PRN
Qty: 30 TABLET | Refills: 0 | Status: SHIPPED | OUTPATIENT
Start: 2025-06-12

## 2025-06-12 RX ORDER — OMEPRAZOLE 40 MG/1
CAPSULE, DELAYED RELEASE ORAL
Qty: 90 CAPSULE | Refills: 2 | Status: SHIPPED | OUTPATIENT
Start: 2025-06-12

## 2025-06-12 ASSESSMENT — PAIN SCALES - GENERAL: PAINLEVEL_OUTOF10: NO PAIN (0)

## 2025-06-12 NOTE — PROGRESS NOTES
Assessment & Plan     Essential hypertension, benign  - hydrochlorothiazide (HYDRODIURIL) 25 MG tablet; Take 1 tablet (25 mg) by mouth daily.    Reyes's esophagus without dysplasia  - omeprazole (PRILOSEC) 40 MG DR capsule; TAKE 1 CAPSULE DAILY 30 TO 60  MINUTES BEFORE BREAKFAST    SYDNEE (generalized anxiety disorder)  - clonazePAM (KLONOPIN) 0.5 MG tablet; Take 0.5-1 tablets (0.25-0.5 mg) by mouth 2 times daily as needed for anxiety.    Class 3 obesity (H)    Body mass index (BMI) 40.0-44.9, adult (H)    Other insomnia  - clonazePAM (KLONOPIN) 0.5 MG tablet; Take 0.5-1 tablets (0.25-0.5 mg) by mouth 2 times daily as needed for anxiety.    Migraine without status migrainosus, not intractable, unspecified migraine type  - SUMAtriptan (IMITREX) 100 MG tablet; TAKE 1 TABLET BY MOUTH AT ONSET OF MIGRAINE MAY REPEAT IN 2 HOURS IF HEADACHE IS NOT GONE (MAX DOSE 2 TABLETS IN 24 HOURS)  - SUMAtriptan Succinate Refill (IMITREX) 6 MG/0.5ML SOCT; Inject 0.5 mLs (6 mg) subcutaneously once for 1 dose. Do not take more than 2 injections within 24 hours.                Subjective   Ruddy is a 64 year old, presenting for the following health issues:  No chief complaint on file.    History of Present Illness       Reason for visit:  Med check   He is taking medications regularly.          Medication Followup of  All  Taking Medication as prescribed: yes  Side Effects:  None  Medication Helping Symptoms:  yes  Hypertension Follow-up    Do you check your blood pressure regularly outside of the clinic? Yes not very often   Are you following a low salt diet? No  Are your blood pressures ever more than 140 on the top number (systolic) OR more   than 90 on the bottom number (diastolic), for example 140/90? No    BP Readings from Last 2 Encounters:   06/12/25 120/79   01/07/25 130/72             Objective    /79   Pulse 72   Temp 97.6  F (36.4  C) (Temporal)   Resp 18   Ht 1.829 m (6')   Wt 128.8 kg (284 lb)   SpO2 98%   BMI  38.52 kg/m    Body mass index is 38.52 kg/m .  Physical Exam  Constitutional:       Appearance: Normal appearance.   Cardiovascular:      Rate and Rhythm: Normal rate and regular rhythm.   Pulmonary:      Effort: Pulmonary effort is normal. No respiratory distress.   Skin:     General: Skin is warm and dry.   Neurological:      General: No focal deficit present.      Mental Status: He is alert and oriented to person, place, and time.                    Signed Electronically by: Jian Falcon DO

## (undated) DEVICE — KIT ENDO TURNOVER/PROCEDURE CARRY-ON 101822

## (undated) DEVICE — TUBING SUCTION 6"X3/16" N56A

## (undated) DEVICE — SOL WATER IRRIG 1000ML BOTTLE 2F7114

## (undated) RX ORDER — FENTANYL CITRATE 50 UG/ML
INJECTION, SOLUTION INTRAMUSCULAR; INTRAVENOUS
Status: DISPENSED
Start: 2017-01-25